# Patient Record
Sex: FEMALE | Race: BLACK OR AFRICAN AMERICAN | NOT HISPANIC OR LATINO | Employment: OTHER | URBAN - METROPOLITAN AREA
[De-identification: names, ages, dates, MRNs, and addresses within clinical notes are randomized per-mention and may not be internally consistent; named-entity substitution may affect disease eponyms.]

---

## 2017-10-23 ENCOUNTER — LAB VISIT (OUTPATIENT)
Dept: LAB | Facility: HOSPITAL | Age: 72
End: 2017-10-23
Attending: INTERNAL MEDICINE
Payer: MEDICARE

## 2017-10-23 ENCOUNTER — OFFICE VISIT (OUTPATIENT)
Dept: RHEUMATOLOGY | Facility: CLINIC | Age: 72
End: 2017-10-23
Payer: MEDICARE

## 2017-10-23 VITALS
BODY MASS INDEX: 37.68 KG/M2 | HEIGHT: 64 IN | SYSTOLIC BLOOD PRESSURE: 136 MMHG | WEIGHT: 220.69 LBS | HEART RATE: 64 BPM | DIASTOLIC BLOOD PRESSURE: 64 MMHG

## 2017-10-23 DIAGNOSIS — R60.0 PEDAL EDEMA: ICD-10-CM

## 2017-10-23 DIAGNOSIS — R60.0 PEDAL EDEMA: Primary | ICD-10-CM

## 2017-10-23 DIAGNOSIS — M79.7 FIBROMYALGIA: ICD-10-CM

## 2017-10-23 DIAGNOSIS — M77.32 HEEL SPUR, LEFT: ICD-10-CM

## 2017-10-23 DIAGNOSIS — Z55.9 EDUCATIONAL CIRCUMSTANCE: ICD-10-CM

## 2017-10-23 DIAGNOSIS — E66.9 OBESITY (BMI 30-39.9): ICD-10-CM

## 2017-10-23 LAB
ALBUMIN SERPL BCP-MCNC: 3.3 G/DL
ALP SERPL-CCNC: 132 U/L
ALT SERPL W/O P-5'-P-CCNC: 28 U/L
ANION GAP SERPL CALC-SCNC: 9 MMOL/L
AST SERPL-CCNC: 27 U/L
BASOPHILS # BLD AUTO: 0.04 K/UL
BASOPHILS NFR BLD: 0.7 %
BILIRUB SERPL-MCNC: 0.4 MG/DL
BUN SERPL-MCNC: 18 MG/DL
CALCIUM SERPL-MCNC: 10.6 MG/DL
CCP AB SER IA-ACNC: 2.4 U/ML
CHLORIDE SERPL-SCNC: 103 MMOL/L
CO2 SERPL-SCNC: 27 MMOL/L
CREAT SERPL-MCNC: 0.9 MG/DL
CRP SERPL-MCNC: 19.3 MG/L
DIFFERENTIAL METHOD: NORMAL
EOSINOPHIL # BLD AUTO: 0.3 K/UL
EOSINOPHIL NFR BLD: 5.4 %
ERYTHROCYTE [DISTWIDTH] IN BLOOD BY AUTOMATED COUNT: 14.4 %
ERYTHROCYTE [SEDIMENTATION RATE] IN BLOOD BY WESTERGREN METHOD: 31 MM/HR
EST. GFR  (AFRICAN AMERICAN): >60 ML/MIN/1.73 M^2
EST. GFR  (NON AFRICAN AMERICAN): >60 ML/MIN/1.73 M^2
GLUCOSE SERPL-MCNC: 81 MG/DL
HCT VFR BLD AUTO: 39.4 %
HGB BLD-MCNC: 13.1 G/DL
IMM GRANULOCYTES # BLD AUTO: 0.02 K/UL
IMM GRANULOCYTES NFR BLD AUTO: 0.4 %
LYMPHOCYTES # BLD AUTO: 1.2 K/UL
LYMPHOCYTES NFR BLD: 20.9 %
MCH RBC QN AUTO: 30 PG
MCHC RBC AUTO-ENTMCNC: 33.2 G/DL
MCV RBC AUTO: 90 FL
MONOCYTES # BLD AUTO: 0.6 K/UL
MONOCYTES NFR BLD: 9.6 %
NEUTROPHILS # BLD AUTO: 3.6 K/UL
NEUTROPHILS NFR BLD: 63 %
NRBC BLD-RTO: 0 /100 WBC
PLATELET # BLD AUTO: 210 K/UL
PMV BLD AUTO: 12 FL
POTASSIUM SERPL-SCNC: 4.2 MMOL/L
PROT SERPL-MCNC: 7.3 G/DL
RBC # BLD AUTO: 4.37 M/UL
RHEUMATOID FACT SERPL-ACNC: 13 IU/ML
SODIUM SERPL-SCNC: 139 MMOL/L
WBC # BLD AUTO: 5.7 K/UL

## 2017-10-23 PROCEDURE — 85025 COMPLETE CBC W/AUTO DIFF WBC: CPT

## 2017-10-23 PROCEDURE — 86431 RHEUMATOID FACTOR QUANT: CPT

## 2017-10-23 PROCEDURE — 99999 PR PBB SHADOW E&M-EST. PATIENT-LVL III: CPT | Mod: PBBFAC,,, | Performed by: INTERNAL MEDICINE

## 2017-10-23 PROCEDURE — 99205 OFFICE O/P NEW HI 60 MIN: CPT | Mod: S$GLB,,, | Performed by: INTERNAL MEDICINE

## 2017-10-23 PROCEDURE — 80053 COMPREHEN METABOLIC PANEL: CPT

## 2017-10-23 PROCEDURE — 86200 CCP ANTIBODY: CPT

## 2017-10-23 PROCEDURE — 36415 COLL VENOUS BLD VENIPUNCTURE: CPT

## 2017-10-23 PROCEDURE — 86140 C-REACTIVE PROTEIN: CPT

## 2017-10-23 PROCEDURE — 85651 RBC SED RATE NONAUTOMATED: CPT

## 2017-10-23 SDOH — SOCIAL DETERMINANTS OF HEALTH (SDOH): PROBLEMS RELATED TO EDUCATION AND LITERACY, UNSPECIFIED: Z55.9

## 2017-10-23 ASSESSMENT — ROUTINE ASSESSMENT OF PATIENT INDEX DATA (RAPID3)
PATIENT GLOBAL ASSESSMENT SCORE: 0
WHEN YOU AWAKENED IN THE MORNING OVER THE LAST WEEK, PLEASE INDICATE THE AMOUNT OF TIME IT TAKES UNTIL YOU ARE AS LIMBER AS YOU WILL BE FOR THE DAY: 35 MINS
TOTAL RAPID3 SCORE: 2.17
AM STIFFNESS SCORE: 1, YES
FATIGUE SCORE: 3.5
MDHAQ FUNCTION SCORE: 0
PAIN SCORE: 6.5
PSYCHOLOGICAL DISTRESS SCORE: 1.1
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
## 2017-10-23 NOTE — PATIENT INSTRUCTIONS
Exercise daily.  You can do Sit and Be Fit.    Lose weight gradually.      Living with Osteoarthritis    Osteoarthritis is a chronic disease and the most common type of arthritis. But it doesnt have to keep you from leading an active life. You can help control symptoms by exercising and losing weight if you are overweight. Using special tools also helps make life easier. Be sure to see your healthcare provider for scheduled checkups and lab work. If you have questions or concerns between office visits, call your healthcare provider's office.  Make exercise part of your life  Gentle exercise can help lessen your pain. Keep the following in mind:  · Choose exercises that improve joint motion and make your muscles stronger. Your healthcare provider or a physical therapist may suggest a few.  · Stretching and flexibility activities such as yoga and obi chi may improve pain and joint motion.  · Try low-impact sports, such as walking, biking, or doing exercises in a warm pool.  · Most people should exercise for at least 30 minutes a day on most days of the week. This can be broken up into shorter periods throughout the day.  · Dont push yourself too hard at first. Slowly build up over time.  · Make sure you warm up for 5 to 10 minutes before you exercise.  · If pain and stiffness increase, don't exercise as hard or as long.  Watch your weight  If you weigh more than you should, your weight-bearing joints are under extra pressure. This makes your symptoms worse. To reduce pain and stiffness, try shedding a few of those extra pounds. The tips below may help:  · Start a weight-loss program with the help of your healthcare provider.  · Ask your friends and family for support.  · Join a weight-loss group.  Use special tools  Even simple tasks can be hard to do when your joints hurt. Special tools called assistive devices can make things easier by reducing strain and protecting your joints. Ask your healthcare provider where  to find these and other helpful tools:  · Long-handled reachers or grabbers  · Jar openers and button threaders  · Large  for pencils, garden tools, and other hand-held objects  Use mobility and other aids  People with arthritis and other joint problems often use mobility aids to help with walking. For example, they may use canes or walkers. They may also use splints or braces to support joints. Talk with your healthcare provider or physical therapist about these aids:  · A cane to reduce knee or hip pain and help prevent falls  · Splints for your wrists or other joints  · A brace to support a weak knee joint  · Orthotics for toe and foot involvement  Medical and surgical treatments  Discuss medical treatments with your healthcare provider to help reduce your pain and improve joint mobility.  · Topical medicines such as lidocaine, capsaicin, and diclofenac gel  · Oral medicines such as acetaminophen, NSAIDs (nonsteroidal anti-inflammatory medicines) such as ibuprofen and naproxen, or opioid narcotics  · Injections in affected joints such as corticosteroids in various joints, or hyaluronic acid in the knee joints  · Surgical repair or surgical joint replacement with artificial joints  · Complementary therapies such as heat and cold treatment, massage, acupuncture, supplements, cognitive training, meditation, and others. Discuss these options with your healthcare provider.  Date Last Reviewed: 2/14/2016  © 9538-8784 [employer]. [address] [place] [address], [place] [address]. All rights reserved. This information is not intended as a substitute for professional medical care. Always follow your healthcare professional's instructions.

## 2020-05-30 ENCOUNTER — OFFICE VISIT (OUTPATIENT)
Dept: URGENT CARE | Facility: CLINIC | Age: 75
End: 2020-05-30
Payer: MEDICARE

## 2020-05-30 VITALS
OXYGEN SATURATION: 99 % | RESPIRATION RATE: 17 BRPM | SYSTOLIC BLOOD PRESSURE: 178 MMHG | DIASTOLIC BLOOD PRESSURE: 80 MMHG | TEMPERATURE: 97 F | HEIGHT: 63 IN | BODY MASS INDEX: 37.21 KG/M2 | HEART RATE: 73 BPM | WEIGHT: 210 LBS

## 2020-05-30 DIAGNOSIS — M54.32 SCIATICA OF LEFT SIDE: Primary | ICD-10-CM

## 2020-05-30 PROCEDURE — 96372 THER/PROPH/DIAG INJ SC/IM: CPT | Mod: S$GLB,,, | Performed by: NURSE PRACTITIONER

## 2020-05-30 PROCEDURE — 99214 OFFICE O/P EST MOD 30 MIN: CPT | Mod: 25,S$GLB,, | Performed by: NURSE PRACTITIONER

## 2020-05-30 PROCEDURE — 96372 PR INJECTION,THERAP/PROPH/DIAG2ST, IM OR SUBCUT: ICD-10-PCS | Mod: S$GLB,,, | Performed by: NURSE PRACTITIONER

## 2020-05-30 PROCEDURE — 99214 PR OFFICE/OUTPT VISIT, EST, LEVL IV, 30-39 MIN: ICD-10-PCS | Mod: 25,S$GLB,, | Performed by: NURSE PRACTITIONER

## 2020-05-30 RX ORDER — OMEPRAZOLE 20 MG/1
CAPSULE, DELAYED RELEASE ORAL
COMMUNITY
Start: 2020-05-19

## 2020-05-30 RX ORDER — IRBESARTAN 300 MG/1
300 TABLET ORAL DAILY
COMMUNITY
Start: 2020-05-06

## 2020-05-30 RX ORDER — MELOXICAM 15 MG/1
15 TABLET ORAL DAILY
Qty: 14 TABLET | Refills: 0 | Status: SHIPPED | OUTPATIENT
Start: 2020-05-30 | End: 2020-06-13

## 2020-05-30 RX ORDER — GABAPENTIN 100 MG/1
100 CAPSULE ORAL 2 TIMES DAILY
COMMUNITY
Start: 2020-05-05

## 2020-05-30 RX ORDER — KETOROLAC TROMETHAMINE 30 MG/ML
30 INJECTION, SOLUTION INTRAMUSCULAR; INTRAVENOUS
Status: COMPLETED | OUTPATIENT
Start: 2020-05-30 | End: 2020-05-30

## 2020-05-30 RX ADMIN — KETOROLAC TROMETHAMINE 30 MG: 30 INJECTION, SOLUTION INTRAMUSCULAR; INTRAVENOUS at 11:05

## 2020-05-30 NOTE — PATIENT INSTRUCTIONS
INCREASE FLUIDS   START TAKING MOBIC DAILY   FOLLOW UP WITH ORTH AS NEEDED IF NO IMPROVEMENT WITH CURRENT MANAGEMENT    You must understand that you've received an Urgent Care treatment only and that you may be released before all your medical problems are known or treated. You, the patient, will arrange for follow up care as instructed.  If your condition worsens we recommend that you receive another evaluation at the emergency room immediately or contact your primary medical clinics after hours call service to discuss your concerns.  Please return here or go to the Emergency Department for any concerns or worsening of condition.      Sciatica    Sciatica is a condition that causes pain in the lower back that spreads down into the buttock, hip, and leg. Sometimes the leg pain can happen without any back pain. Sciatica happens when a spinal nerve is irritated or has pressure put on it as comes out of the spinal canal in the lower back. This most often happens when a bulge or rupture of a nearby spinal disk presses on the nerve. Sciatica can also be caused by a narrowing of the spinal canal (spinal stenosis) or spasm of the muscle in the buttocks that the sciatic nerve passes through (pyriform muscle). Sciatica is also called lumbar radiculopathy.  Sciatica may begin after a sudden twisting or bending force, such as in a car accident. Or it can happen after a simple awkward movement. In either case, muscle spasm often also happens. Muscle spasm makes the pain worse.  A healthcare provider makes a diagnosis of sciatica from your symptoms and a physical exam. Unless you had an injury from a car accident or fall, you usually wont have X-rays taken at this time. This is because the nerves and disks in your back cant be seen on an X-ray. If the provider sees signs of a compressed nerve, you will need to schedule an MRI scan as an outpatient. Signs of a compressed nerve include loss of strength in a leg.  Most sciatica  gets better with medicine, exercise, and physical therapy. If your symptoms continue after at least 3 months of medical treatment, you may need surgery or injections to your lower back.  Home care  Follow these tips when caring for yourself at home:  · You may need to stay in bed the first few days. But as soon as possible, begin sitting up or walking. This will help you avoid problems that come from staying in bed for long periods.  · When in bed, try to find a position that is comfortable. A firm mattress is best. Try lying flat on your back with pillows under your knees. You can also try lying on your side with your knees bent up toward your chest and a pillow between your knees.  · Avoid sitting for long periods. This puts more stress on your lower back than standing or walking.  · Use heat from a hot shower, hot bath, or heating pad to help ease pain. Massage can also help. You can also try using an ice pack. You can make your own ice pack by putting ice cubes in a plastic bag. Wrap the bag in a thin towel. Try both heat and cold to see which works best. Use the method that feels best for 20 minutes several times a day.  · You may use acetaminophen or ibuprofen to ease pain, unless another pain medicine was prescribed. Note: If you have chronic liver or kidney disease, talk with your healthcare provider before taking these medicines. Also talk with your provider if youve had a stomach ulcer or gastrointestinal bleeding.  · Use safe lifting methods. Dont lift anything heavier than 15 pounds until all of the pain is gone.  Follow-up care  Follow up with your healthcare provider, or as advised. You may need physical therapy or additional tests.  If X-rays were taken, a radiologist will look at them. You will be told of any new findings that may affect your care.  When to seek medical advice  Call your healthcare provider right away if any of these occur:  · Pain gets worse even after taking prescribed  medicine  · Weakness or numbness in 1 or both legs or hips  · Numbness in your groin or genital area  · You cant control your bowel or bladder  · Fever  · Redness or swelling over your back or spine   Date Last Reviewed: 8/1/2016  © 1671-5010 [employer]. [address] [place] [address]. All rights reserved. This information is not intended as a substitute for professional medical care. Always follow your healthcare professional's instructions.

## 2020-05-30 NOTE — PROGRESS NOTES
"Subjective:       Patient ID: Adri Rice is a 74 y.o. female.    Vitals:  height is 5' 3" (1.6 m) and weight is 95.3 kg (210 lb). Her temperature is 97.1 °F (36.2 °C). Her blood pressure is 178/80 (abnormal) and her pulse is 73. Her respiration is 17 and oxygen saturation is 99%.     Chief Complaint: Sciatica    Pt states hx of chronic back pain, arthritis on knee (mobic daily 15 mg), sciatica, and HTN. Pt states flare up of left lower back pain with left leg pain x 4 days. Pt denies injury.  Denies any heavy lifting or straining. Denies any frequency, urgency or dysuria.     Back Pain   This is a chronic problem. The current episode started in the past 7 days. The problem occurs constantly. The problem is unchanged. The pain is present in the sacro-iliac. The quality of the pain is described as shooting. The pain radiates to the left thigh. The pain is at a severity of 9/10. The symptoms are aggravated by sitting. Pertinent negatives include no chest pain, dysuria, fever, headaches or weakness.       Constitution: Negative for chills, fatigue and fever.   HENT: Negative for congestion and sore throat.    Neck: Negative for painful lymph nodes.   Cardiovascular: Negative for chest pain and leg swelling.   Eyes: Negative for double vision and blurred vision.   Respiratory: Negative for cough and shortness of breath.    Gastrointestinal: Negative for nausea, vomiting and diarrhea.   Genitourinary: Negative for dysuria, frequency, urgency and history of kidney stones.   Musculoskeletal: Positive for back pain. Negative for joint pain, joint swelling, muscle cramps and muscle ache.   Skin: Negative for color change, pale, rash and bruising.   Allergic/Immunologic: Negative for seasonal allergies.   Neurological: Negative for dizziness, history of vertigo, light-headedness, passing out and headaches.   Hematologic/Lymphatic: Negative for swollen lymph nodes.   Psychiatric/Behavioral: Negative for " nervous/anxious, sleep disturbance and depression. The patient is not nervous/anxious.        Objective:      Physical Exam   Constitutional: She is oriented to person, place, and time. Vital signs are normal. She appears well-developed and well-nourished. She is active and cooperative. No distress.   HENT:   Head: Normocephalic and atraumatic.   Nose: Nose normal.   Mouth/Throat: Oropharynx is clear and moist and mucous membranes are normal.   Eyes: Conjunctivae and lids are normal.   Neck: Trachea normal, normal range of motion, full passive range of motion without pain and phonation normal. Neck supple.   Cardiovascular: Normal rate, regular rhythm, normal heart sounds, intact distal pulses and normal pulses.   Pulmonary/Chest: Effort normal and breath sounds normal.   Abdominal: Soft. Normal appearance and bowel sounds are normal. She exhibits no abdominal bruit, no pulsatile midline mass and no mass.   Musculoskeletal: She exhibits no edema or deformity.        Left hip: She exhibits tenderness. She exhibits normal range of motion, normal strength, no bony tenderness, no swelling and no crepitus.        Lumbar back: She exhibits tenderness and pain. She exhibits normal range of motion, no bony tenderness, no swelling, no edema, no deformity, no laceration, no spasm and normal pulse.   NEG ST LEG RAISE  FULL ROM B LE WITH 5/5 STRENGTH  2+DTR PATELLA AND ACHILLES  NVIT DISTALLY WITH SILT AND 2+BCR  ABLE TO AMBULATE WITH MILD LIMPING GAIT DUE TO PAIN     Neurological: She is alert and oriented to person, place, and time. She has normal strength and normal reflexes. No sensory deficit.   Skin: Skin is warm, dry, intact and not diaphoretic.   Psychiatric: She has a normal mood and affect. Her speech is normal and behavior is normal. Judgment and thought content normal. Cognition and memory are normal.   Nursing note and vitals reviewed.        Assessment:       1. Sciatica of left side        Plan:         Sciatica  of left side  -     ketorolac injection 30 mg  -     meloxicam (MOBIC) 15 MG tablet; Take 1 tablet (15 mg total) by mouth once daily. for 14 days  Dispense: 14 tablet; Refill: 0           Patient Instructions   INCREASE FLUIDS   START TAKING MOBIC DAILY   FOLLOW UP WITH ORTH AS NEEDED IF NO IMPROVEMENT WITH CURRENT MANAGEMENT    You must understand that you've received an Urgent Care treatment only and that you may be released before all your medical problems are known or treated. You, the patient, will arrange for follow up care as instructed.  If your condition worsens we recommend that you receive another evaluation at the emergency room immediately or contact your primary medical clinics after hours call service to discuss your concerns.  Please return here or go to the Emergency Department for any concerns or worsening of condition.      Sciatica    Sciatica is a condition that causes pain in the lower back that spreads down into the buttock, hip, and leg. Sometimes the leg pain can happen without any back pain. Sciatica happens when a spinal nerve is irritated or has pressure put on it as comes out of the spinal canal in the lower back. This most often happens when a bulge or rupture of a nearby spinal disk presses on the nerve. Sciatica can also be caused by a narrowing of the spinal canal (spinal stenosis) or spasm of the muscle in the buttocks that the sciatic nerve passes through (pyriform muscle). Sciatica is also called lumbar radiculopathy.  Sciatica may begin after a sudden twisting or bending force, such as in a car accident. Or it can happen after a simple awkward movement. In either case, muscle spasm often also happens. Muscle spasm makes the pain worse.  A healthcare provider makes a diagnosis of sciatica from your symptoms and a physical exam. Unless you had an injury from a car accident or fall, you usually wont have X-rays taken at this time. This is because the nerves and disks in your back  cant be seen on an X-ray. If the provider sees signs of a compressed nerve, you will need to schedule an MRI scan as an outpatient. Signs of a compressed nerve include loss of strength in a leg.  Most sciatica gets better with medicine, exercise, and physical therapy. If your symptoms continue after at least 3 months of medical treatment, you may need surgery or injections to your lower back.  Home care  Follow these tips when caring for yourself at home:  · You may need to stay in bed the first few days. But as soon as possible, begin sitting up or walking. This will help you avoid problems that come from staying in bed for long periods.  · When in bed, try to find a position that is comfortable. A firm mattress is best. Try lying flat on your back with pillows under your knees. You can also try lying on your side with your knees bent up toward your chest and a pillow between your knees.  · Avoid sitting for long periods. This puts more stress on your lower back than standing or walking.  · Use heat from a hot shower, hot bath, or heating pad to help ease pain. Massage can also help. You can also try using an ice pack. You can make your own ice pack by putting ice cubes in a plastic bag. Wrap the bag in a thin towel. Try both heat and cold to see which works best. Use the method that feels best for 20 minutes several times a day.  · You may use acetaminophen or ibuprofen to ease pain, unless another pain medicine was prescribed. Note: If you have chronic liver or kidney disease, talk with your healthcare provider before taking these medicines. Also talk with your provider if youve had a stomach ulcer or gastrointestinal bleeding.  · Use safe lifting methods. Dont lift anything heavier than 15 pounds until all of the pain is gone.  Follow-up care  Follow up with your healthcare provider, or as advised. You may need physical therapy or additional tests.  If X-rays were taken, a radiologist will look at them. You  will be told of any new findings that may affect your care.  When to seek medical advice  Call your healthcare provider right away if any of these occur:  · Pain gets worse even after taking prescribed medicine  · Weakness or numbness in 1 or both legs or hips  · Numbness in your groin or genital area  · You cant control your bowel or bladder  · Fever  · Redness or swelling over your back or spine   Date Last Reviewed: 8/1/2016  © 5993-3365 [employer] [address] [place] [address], [place] [address]. All rights reserved. This information is not intended as a substitute for professional medical care. Always follow your healthcare professional's instructions.

## 2020-06-26 ENCOUNTER — TELEPHONE (OUTPATIENT)
Dept: NEUROSURGERY | Facility: CLINIC | Age: 75
End: 2020-06-26

## 2020-06-26 DIAGNOSIS — M48.07 SPINAL STENOSIS, LUMBOSACRAL REGION: ICD-10-CM

## 2020-07-20 ENCOUNTER — TELEPHONE (OUTPATIENT)
Dept: NEUROSURGERY | Facility: CLINIC | Age: 75
End: 2020-07-20

## 2020-07-20 NOTE — TELEPHONE ENCOUNTER
Rad report and referral from Dr. Lee sent to MR for upload.  Pt states she is not interested in NS intervention at this time and was under the assumption she was seeing Dr. Padilla for PM services.  Recommended the pt reach out to Dr. Lee for a referral to PM and invited pt to call if she decides she would like to be seen in NS.  V/U.

## 2020-07-30 ENCOUNTER — TELEPHONE (OUTPATIENT)
Dept: PAIN MEDICINE | Facility: CLINIC | Age: 75
End: 2020-07-30

## 2020-07-30 DIAGNOSIS — M54.16 LUMBAR BACK PAIN WITH RADICULOPATHY AFFECTING LEFT LOWER EXTREMITY: Primary | ICD-10-CM

## 2020-08-06 ENCOUNTER — TELEPHONE (OUTPATIENT)
Dept: PAIN MEDICINE | Facility: CLINIC | Age: 75
End: 2020-08-06

## 2020-08-06 NOTE — TELEPHONE ENCOUNTER
----- Message from Joanne Torres sent at 8/6/2020  9:58 AM CDT -----  Regarding: Patient call back  Who called:LIZ MATILDA [[id number]]    What is the request in detail: Patient is requesting a call back. She states she has psoriasis and there is an affecting area in her back. She would like to know if the staff will still be able to give her the injection.   Please advise.    Can the clinic reply by [facility]? No    Best call back number: [phone]    Additional Information: N/A

## 2020-08-06 NOTE — TELEPHONE ENCOUNTER
Patient was contacted and informed that she can have her injection however the provider will evaluate her and inform indefinitely if he can perform the procedure while she has psoriasis. Patient verbalized understanding

## 2020-08-11 ENCOUNTER — HOSPITAL ENCOUNTER (OUTPATIENT)
Facility: OTHER | Age: 75
Discharge: HOME | End: 2020-08-11
Attending: ANESTHESIOLOGY | Admitting: ANESTHESIOLOGY
Payer: MEDICARE

## 2020-08-11 VITALS
HEART RATE: 63 BPM | WEIGHT: 205 LBS | HEIGHT: 63 IN | BODY MASS INDEX: 36.32 KG/M2 | TEMPERATURE: 98 F | DIASTOLIC BLOOD PRESSURE: 79 MMHG | RESPIRATION RATE: 16 BRPM | SYSTOLIC BLOOD PRESSURE: 179 MMHG | OXYGEN SATURATION: 98 %

## 2020-08-11 DIAGNOSIS — M54.16 LUMBAR RADICULOPATHY: Primary | ICD-10-CM

## 2020-08-11 DIAGNOSIS — G89.29 CHRONIC PAIN: ICD-10-CM

## 2020-08-11 PROCEDURE — 62323 NJX INTERLAMINAR LMBR/SAC: CPT | Mod: ,,, | Performed by: ANESTHESIOLOGY

## 2020-08-11 PROCEDURE — 62323 PR INJ LUMBAR/SACRAL, W/IMAGING GUIDANCE: ICD-10-PCS | Mod: ,,, | Performed by: ANESTHESIOLOGY

## 2020-08-11 PROCEDURE — 25000003 PHARM REV CODE 250: Performed by: STUDENT IN AN ORGANIZED HEALTH CARE EDUCATION/TRAINING PROGRAM

## 2020-08-11 PROCEDURE — 25500020 PHARM REV CODE 255: Performed by: ANESTHESIOLOGY

## 2020-08-11 PROCEDURE — 63600175 PHARM REV CODE 636 W HCPCS: Performed by: ANESTHESIOLOGY

## 2020-08-11 PROCEDURE — 62323 NJX INTERLAMINAR LMBR/SAC: CPT | Performed by: ANESTHESIOLOGY

## 2020-08-11 PROCEDURE — 25000003 PHARM REV CODE 250: Performed by: ANESTHESIOLOGY

## 2020-08-11 RX ORDER — SODIUM CHLORIDE 0.9 G/100ML
IRRIGANT IRRIGATION
Status: DISCONTINUED | OUTPATIENT
Start: 2020-08-11 | End: 2020-08-11 | Stop reason: HOSPADM

## 2020-08-11 RX ORDER — DEXAMETHASONE SODIUM PHOSPHATE 10 MG/ML
INJECTION INTRAMUSCULAR; INTRAVENOUS
Status: DISCONTINUED | OUTPATIENT
Start: 2020-08-11 | End: 2020-08-11 | Stop reason: HOSPADM

## 2020-08-11 RX ORDER — SODIUM CHLORIDE 9 MG/ML
500 INJECTION, SOLUTION INTRAVENOUS CONTINUOUS
Status: DISCONTINUED | OUTPATIENT
Start: 2020-08-11 | End: 2020-08-11 | Stop reason: HOSPADM

## 2020-08-11 RX ORDER — MIDAZOLAM HYDROCHLORIDE 1 MG/ML
INJECTION INTRAMUSCULAR; INTRAVENOUS
Status: DISCONTINUED | OUTPATIENT
Start: 2020-08-11 | End: 2020-08-11 | Stop reason: HOSPADM

## 2020-08-11 RX ORDER — LIDOCAINE HYDROCHLORIDE 10 MG/ML
INJECTION INFILTRATION; PERINEURAL
Status: DISCONTINUED | OUTPATIENT
Start: 2020-08-11 | End: 2020-08-11 | Stop reason: HOSPADM

## 2020-08-11 RX ORDER — FENTANYL CITRATE 50 UG/ML
INJECTION, SOLUTION INTRAMUSCULAR; INTRAVENOUS
Status: DISCONTINUED | OUTPATIENT
Start: 2020-08-11 | End: 2020-08-11 | Stop reason: HOSPADM

## 2020-08-11 RX ORDER — BUPIVACAINE HYDROCHLORIDE 2.5 MG/ML
INJECTION, SOLUTION EPIDURAL; INFILTRATION; INTRACAUDAL
Status: DISCONTINUED | OUTPATIENT
Start: 2020-08-11 | End: 2020-08-11 | Stop reason: HOSPADM

## 2020-08-11 RX ADMIN — SODIUM CHLORIDE 500 ML: 0.9 INJECTION, SOLUTION INTRAVENOUS at 10:08

## 2020-08-11 NOTE — H&P
"HPI  Patient presenting for Procedure(s) (LRB):  LUMBAR L2/3 NANCY DIRECT REFERRAL (N/A)     Patient on Anti-coagulation No    75 y/o female referred to us by her primary care for a L2/3 NANCY. She has known spinal stenosis with radicular symptoms into the left leg. She has declined surgical intervention at this time. She endorses constant low back pain with radiation into the left buttock and thigh since around October 2019. This is a flare of her chronic low back pain, which is intermittently present. She has tried muscle relaxers and anti-inflammatories without sustained relief.  She has also received Toradol and PO corticosteroids, which have helped in the past. She has not had any interventions for this pain in the past, but she has had cervical pain interventions in the past with good relief.     Past Medical History:   Diagnosis Date    Asthma     Cancer     breast    Fibromyalgia     Hypertension     Psoriasis vulgaris      Past Surgical History:   Procedure Laterality Date    APPENDECTOMY  1962    BREAST LUMPECTOMY  7-    breast cancer    BUNIONECTOMY  2010    KNEE ARTHROSCOPY  2004    right    KNEE SURGERY  10-13-14    right TKR    SHOULDER SURGERY  1982    right/had a bone infectioni    THYROIDECTOMY  1977    hyperparathyroid&thyroidectomy 2006     Review of patient's allergies indicates:   Allergen Reactions    Bactrim [sulfamethoxazole-trimethoprim] Swelling    Atorvastatin Other (See Comments)      Current Facility-Administered Medications   Medication    0.9%  NaCl infusion       PMHx, PSHx, Allergies, Medications reviewed in epic    ROS negative except pain complaints in HPI    OBJECTIVE:    BP (!) 148/69 (BP Location: Left arm, Patient Position: Sitting)   Pulse 71   Temp 98.2 °F (36.8 °C) (Oral)   Resp 17   Ht 5' 3" (1.6 m)   Wt 93 kg (205 lb)   LMP  (LMP Unknown)   SpO2 99%   BMI 36.31 kg/m²     PHYSICAL EXAMINATION:    GENERAL: Well appearing, in no acute distress, alert " and oriented x3.  PSYCH:  Mood and affect appropriate.  SKIN: psoriasis plaque on the back in the area of the lumbar spine.  CV: RRR with palpation of the radial artery.  PULM: No evidence of respiratory difficulty, symmetric chest rise. Clear to auscultation.  NEURO: Cranial nerves grossly intact.    MRI lumbar spine ext report with stenosis at L2/3 and L3/4 with neuroforaminal stenosis DDD facet hypertrophy.    Plan:    Proceed with procedure as planned Procedure(s) (LRB):  LUMBAR L2/3 NANCY DIRECT REFERRAL (N/A)    F/u in clinic 2 weeks for full evaluation    In the future the patient may benefit from an epidural injection at L4-5 below the area of stenosis    If no response and neurosurgery is not a consideration, may consider spinal cord stimulation in the future, but will need to work to control psoriasis.      Amilcar Freitas  08/11/2020          I reviewed and edited the  resident's note, I conducted my own interview and physical examination and agree with the findings.     Ion Bearden  08/11/2020

## 2020-08-11 NOTE — DISCHARGE INSTRUCTIONS

## 2020-08-11 NOTE — DISCHARGE SUMMARY
Discharge Note  Short Stay      SUMMARY     Admit Date: 8/11/2020    Attending Physician: Ion Bearden      Discharge Physician: Ion Bearden      Discharge Date: 8/11/2020 1:50 PM    Procedure(s) (LRB):  LUMBAR L2/3 NANCY DIRECT REFERRAL (N/A)    Final Diagnosis: Lumbar back pain with radiculopathy affecting left lower extremity [M54.16]    Disposition: Home or self care    Patient Instructions:   Discharge Medication List as of 8/11/2020 11:27 AM      CONTINUE these medications which have NOT CHANGED    Details   albuterol (ACCUNEB) 0.63 mg/3 mL Nebu Take 0.63 mg by nebulization every 6 (six) hours as needed., Until Discontinued, Historical Med      calcipotriene (DOVONOX) 0.005 % cream Starting 7/31/2015, Until Discontinued, Historical Med      calcipotriene 0.005 % ointment Starting 9/2/2015, Until Discontinued, Historical Med      calcium carbonate-vitamin D3 250-125 mg 250-125 mg-unit Tab Take 1 tablet by mouth once daily., Starting 10/24/2014, Until Sat 10/24/15, OTC      cetirizine (ZYRTEC) 10 MG tablet Take 10 mg by mouth once daily., Until Discontinued, Historical Med      desloratadine (CLARINEX) 5 mg tablet Starting 5/7/2015, Until Discontinued, Historical Med      fluocinonide (LIDEX) 0.05 % external solution Apply topically once daily. To scalp, Starting 5/23/2013, Until Sun 6/2/13, Normal      fluticasone (FLOVENT HFA) 110 mcg/actuation inhaler Inhale 1 puff into the lungs 2 (two) times daily., Until Discontinued, Historical Med      gabapentin (NEURONTIN) 100 MG capsule Starting Tue 5/5/2020, Historical Med      irbesartan (AVAPRO) 300 MG tablet Starting Wed 5/6/2020, Historical Med      irbesartan-hydrochlorothiazide (AVALIDE) 300-12.5 mg per tablet Starting 7/10/2014, Until Discontinued, Historical Med      levothyroxine (SYNTHROID) 125 MCG tablet Take 125 mcg by mouth once daily., Until Discontinued, Historical Med      omeprazole (PRILOSEC) 20 MG capsule Starting Tue 5/19/2020, Historical  Med      VECTICAL 3 mcg/gram ointment AAA qd - bid for maintanence to under breast and stomach, Normal                 Discharge Diagnosis: Lumbar back pain with radiculopathy affecting left lower extremity [M54.16]  Condition on Discharge: Stable with no complications to procedure   Diet on Discharge: Same as before.  Activity: as per instruction sheet.  Discharge to: Home with a responsible adult.  Follow up: 2-4 weeks       Please call my office or pager at [phone] if experienced any weakness or loss of sensation, fever > 101.5, pain uncontrolled with oral medications, persistent nausea/vomiting/or diarrhea, redness or drainage from the incisions, or any other worrisome concerns. If physician on call was not reached or could not communicate with our office for any reason please go to the nearest emergency department

## 2020-08-11 NOTE — OP NOTE
Lumbar Interlaminar Epidural Steroid Injection under Fluoroscopic Guidance.   Time-out taken to identify patient and procedure prior to starting the procedure.     08/11/2020    PROCEDURE: Interlaminar epidural steroid injection under fluoroscopic guidance.     Pre-Op diagnosis: Lumbar back pain with radiculopathy affecting left lower extremity [M54.16]    Post-Op diagnosis: Lumbar back pain with radiculopathy affecting left lower extremity [M54.16]    PHYSICIAN: SANDOVAL LU     ASSISTANTS: Landon Freitas, PGY-2  I was present and supervising all critical portions of the procedure       ESTIMATED BLOOD LOSS: none.     COMPLICATIONS: none.     SPECIMENS: none    TECHNIQUE: With the patient laying in a prone position, the area was prepped and draped in the usual sterile fashion using ChloraPrep and a fenestrated drape. 1% lidocaine was given using a 27-gauge needle by raising a wheal and going down to the hub of the needle over the L2/3 interlaminar space.  The interlaminar space was then approached with a 3.5 inch 18-gauge Touhy needle was introduced under fluoroscopic guidance in the AP and Lateral view. Once the Ligamentum flavum was encountered loss of resistance to saline was used to enter the epidural space. With positive loss of resistance and negative CSF or Blood, 3mL contrast dye Omnipaque (300mg/ml) was injected to confirm placement and there was no vascular runoff. Then 1ml 10mg/ml Decadron+ 1mL 0.25% Bupivicaine + 8mL preservative free normal saline was injected slowly. Displacement of the radio opaque contrast after injection of the medication confirmed that the medication went into the area of the epidural space.  The patient tolerated the procedure well.     Conscious sedation provided by M.D    The patient was monitored with continuous pulse oximetry, EKG, and intermittent blood pressure monitors.  The patient was hemodynamically stable throughout the entire process was responsive to voice,  and breathing spontaneously.  Supplemental O2 was provided at 2L/min via nasal cannula.  Patient was comfortable for the duration of the procedure. (See nurse documentation and case log for sedation time)    There was a total of 1mg IV Midazolam and 50mcg Fentanyl titrated for the procedure      The patient was monitored after the procedure.   They were given post-procedure and discharge instructions to follow at home.  The patient was discharged in a stable condition.

## 2020-09-08 ENCOUNTER — OFFICE VISIT (OUTPATIENT)
Dept: PAIN MEDICINE | Facility: CLINIC | Age: 75
End: 2020-09-08
Payer: MEDICARE

## 2020-09-08 VITALS
BODY MASS INDEX: 38.27 KG/M2 | RESPIRATION RATE: 18 BRPM | TEMPERATURE: 98 F | DIASTOLIC BLOOD PRESSURE: 73 MMHG | HEIGHT: 63 IN | SYSTOLIC BLOOD PRESSURE: 138 MMHG | WEIGHT: 216 LBS | HEART RATE: 71 BPM

## 2020-09-08 DIAGNOSIS — M79.7 FIBROMYALGIA: ICD-10-CM

## 2020-09-08 DIAGNOSIS — M54.16 LUMBAR RADICULOPATHY: Primary | ICD-10-CM

## 2020-09-08 DIAGNOSIS — G89.29 OTHER CHRONIC PAIN: ICD-10-CM

## 2020-09-08 PROCEDURE — 99999 PR PBB SHADOW E&M-EST. PATIENT-LVL V: CPT | Mod: PBBFAC,,, | Performed by: NURSE PRACTITIONER

## 2020-09-08 PROCEDURE — 99214 PR OFFICE/OUTPT VISIT, EST, LEVL IV, 30-39 MIN: ICD-10-PCS | Mod: S$GLB,,, | Performed by: NURSE PRACTITIONER

## 2020-09-08 PROCEDURE — 1125F PR PAIN SEVERITY QUANTIFIED, PAIN PRESENT: ICD-10-PCS | Mod: S$GLB,,, | Performed by: NURSE PRACTITIONER

## 2020-09-08 PROCEDURE — 99214 OFFICE O/P EST MOD 30 MIN: CPT | Mod: S$GLB,,, | Performed by: NURSE PRACTITIONER

## 2020-09-08 PROCEDURE — 3075F PR MOST RECENT SYSTOLIC BLOOD PRESS GE 130-139MM HG: ICD-10-PCS | Mod: CPTII,S$GLB,, | Performed by: NURSE PRACTITIONER

## 2020-09-08 PROCEDURE — 1101F PT FALLS ASSESS-DOCD LE1/YR: CPT | Mod: CPTII,S$GLB,, | Performed by: NURSE PRACTITIONER

## 2020-09-08 PROCEDURE — 99999 PR PBB SHADOW E&M-EST. PATIENT-LVL V: ICD-10-PCS | Mod: PBBFAC,,, | Performed by: NURSE PRACTITIONER

## 2020-09-08 PROCEDURE — 1159F MED LIST DOCD IN RCRD: CPT | Mod: S$GLB,,, | Performed by: NURSE PRACTITIONER

## 2020-09-08 PROCEDURE — 3078F DIAST BP <80 MM HG: CPT | Mod: CPTII,S$GLB,, | Performed by: NURSE PRACTITIONER

## 2020-09-08 PROCEDURE — 1125F AMNT PAIN NOTED PAIN PRSNT: CPT | Mod: S$GLB,,, | Performed by: NURSE PRACTITIONER

## 2020-09-08 PROCEDURE — 1159F PR MEDICATION LIST DOCUMENTED IN MEDICAL RECORD: ICD-10-PCS | Mod: S$GLB,,, | Performed by: NURSE PRACTITIONER

## 2020-09-08 PROCEDURE — 3078F PR MOST RECENT DIASTOLIC BLOOD PRESSURE < 80 MM HG: ICD-10-PCS | Mod: CPTII,S$GLB,, | Performed by: NURSE PRACTITIONER

## 2020-09-08 PROCEDURE — 3075F SYST BP GE 130 - 139MM HG: CPT | Mod: CPTII,S$GLB,, | Performed by: NURSE PRACTITIONER

## 2020-09-08 PROCEDURE — 1101F PR PT FALLS ASSESS DOC 0-1 FALLS W/OUT INJ PAST YR: ICD-10-PCS | Mod: CPTII,S$GLB,, | Performed by: NURSE PRACTITIONER

## 2020-09-08 RX ORDER — AMLODIPINE BESYLATE 5 MG/1
5 TABLET ORAL DAILY
COMMUNITY
Start: 2020-08-10

## 2020-09-08 NOTE — PROGRESS NOTES
Chronic Pain - Consult     Referring Physician: No ref. provider found    Chief Complaint:   Chief Complaint   Patient presents with    Low-back Pain        SUBJECTIVE: Disclaimer: This note has been generated using voice-recognition software. There may be typographical errors that have been missed during proof-reading    Initial encounter:    Adri Rice presents to the clinic for the evaluation of back and leg pain.  She is s/p L2/3 IL NANCY as a direct referral from her orthopedist, Dr. Lee. She reports limited benefit from the procedure.  She is reporting pain across the lower back with radiation into the anterolateral aspect of both legs, left greater than right.  She says that she has all over pain from fibromyalgia.  She also says that her pain is usually worse in the morning and she has improvement with ambulation and activity.  She has not tried physical therapy in the recent few years.  The pain started years ago and symptoms have been worsening.    Brief history:    Pain Description:    The pain is located in the back area and radiates to the legs.      At BEST  4/10     At WORST  8/10 on the WORST day.      On average pain is rated as 6/10.     Today the pain is rated as 6/10    The pain is described as shooting and stabbing      Symptoms interfere with daily activity and sleeping.     Exacerbating factors: Bending and Walking.      Mitigating factors medications and rest.     Patient denies night fever/night sweats.  Patient denies any suicidal or homicidal ideations    Pain Medications:  Current:  Tylenol PRN- helpful    Tried in Past:  NSAIDs - Mobic  TCA -Never  SNRI -Never  Anti-convulsants - Gabapentin  Muscle Relaxants -Never  Opioids- Norco recently, causes constipation, Tramadol- helpful  Benzodiazepines -Never    Physical Therapy/Home Exercise: no       report:  Not applicable    Pain Procedures:  8/11/20 L2/3 IL NANCY- limited benefit    Chiropractor -never  Acupuncture -  never  TENS unit -never  Spinal decompression -never  Joint replacement - Right TKA    Imaging: Outside lumbar MRI 3/6/20  Impression:    1.  Moderate discogenic disease at L3-4 and L4-5 with hypertrophic facet arthropathy seen throughout the lumbar spine.  There is grade 1 spondylolisthesis of L3 on L4 and L4 on L5.  There is foraminal restriction seen primarily at L2-3 on the left, bilaterally at L3-4 and L4-5.  Bruit contact is seen within the left neural foramina at L2-3 and bilaterally at L3-4.  There is mild contact of the exiting nerve root and the left neural foramina at L4-5.  2.  Moderate lumbar stenosis at L2-3 with moderate lumbar stenosis at L4-5 and marked stenosis at L3-4.      Lab Results   Component Value Date    WBC 5.70 10/23/2017    HGB 13.1 10/23/2017    HCT 39.4 10/23/2017    MCV 90 10/23/2017     10/23/2017         Past Medical History:   Diagnosis Date    Asthma     Cancer     breast    Fibromyalgia     Hypertension     Psoriasis vulgaris      Past Surgical History:   Procedure Laterality Date    APPENDECTOMY  1962    BREAST LUMPECTOMY  7-    breast cancer    BUNIONECTOMY  2010    EPIDURAL STEROID INJECTION N/A 8/11/2020    Procedure: LUMBAR L2/3 NANCY DIRECT REFERRAL;  Surgeon: Ion Bearden MD;  Location: [facility];  Service: Pain Management;  Laterality: N/A;  NEEDS CONSENT    KNEE ARTHROSCOPY  2004    right    KNEE SURGERY  10-13-14    right TKR    SHOULDER SURGERY  1982    right/had a bone infectioni    THYROIDECTOMY  1977    hyperparathyroid&thyroidectomy 2006     Social History     Socioeconomic History    Marital status:      Spouse name: Not on file    Number of children: Not on file    Years of education: Not on file    Highest education level: Not on file   Occupational History    Not on file   Social Needs    Financial resource strain: Not on file    Food insecurity     Worry: Not on file     Inability: Not on file     Transportation needs     Medical: Not on file     Non-medical: Not on file   Tobacco Use    Smoking status: Former Smoker     Quit date: 1999     Years since quittin.1    Smokeless tobacco: Never Used   Substance and Sexual Activity    Alcohol use: No    Drug use: No    Sexual activity: Never   Lifestyle    Physical activity     Days per week: Not on file     Minutes per session: Not on file    Stress: Not on file   Relationships    Social connections     Talks on phone: Not on file     Gets together: Not on file     Attends Denominational service: Not on file     Active member of club or organization: Not on file     Attends meetings of clubs or organizations: Not on file     Relationship status: Not on file   Other Topics Concern    Are you pregnant or think you may be? Not Asked    Breast-feeding Not Asked   Social History Narrative    Not on file     Family History   Problem Relation Age of Onset    Skin cancer Neg Hx     Psoriasis Neg Hx        Review of patient's allergies indicates:   Allergen Reactions    Atorvastatin Other (See Comments)    Rosuvastatin Other (See Comments)     Felt vibration/myalgia     Sulfamethoxazole-trimethoprim Swelling, Diarrhea and Other (See Comments)       Current Outpatient Medications   Medication Sig    albuterol (ACCUNEB) 0.63 mg/3 mL Nebu Take 0.63 mg by nebulization every 6 (six) hours as needed.    amLODIPine (NORVASC) 5 MG tablet TAKE 1 TABLET BY MOUTH DAILY PLEASE NOTE DOSE CHANGE TO THIS MEDICATION.    calcipotriene (DOVONOX) 0.005 % cream     calcipotriene 0.005 % ointment     calcium carbonate-vitamin D3 250-125 mg 250-125 mg-unit Tab Take 1 tablet by mouth once daily.    cetirizine (ZYRTEC) 10 MG tablet Take 10 mg by mouth once daily.    fluticasone (FLOVENT HFA) 110 mcg/actuation inhaler Inhale 1 puff into the lungs 2 (two) times daily.    gabapentin (NEURONTIN) 100 MG capsule     irbesartan (AVAPRO) 300 MG tablet     levothyroxine  "(SYNTHROID) 125 MCG tablet Take 75 mcg by mouth once daily.     omeprazole (PRILOSEC) 20 MG capsule     desloratadine (CLARINEX) 5 mg tablet     fluocinonide (LIDEX) 0.05 % external solution Apply topically once daily. To scalp    irbesartan-hydrochlorothiazide (AVALIDE) 300-12.5 mg per tablet     VECTICAL 3 mcg/gram ointment AAA qd - bid for maintanence to under breast and stomach (Patient not taking: Reported on 9/8/2020)     No current facility-administered medications for this visit.        REVIEW OF SYSTEMS:    GENERAL:  No weight loss, malaise or fevers.  HEENT:   No recent changes in vision or hearing  NECK:  Negative for lumps, no difficulty with swallowing.  RESPIRATORY:  Negative for cough, wheezing or shortness of breath, patient denies any recent URI.  CARDIOVASCULAR:  Negative for chest pain, leg swelling or palpitations.  GI:  Negative for abdominal discomfort, blood in stools or black stools or change in bowel habits.  MUSCULOSKELETAL:  See HPI.  SKIN:  Negative for lesions, rash, and itching.  PSYCH:  No mood disorder or recent psychosocial stressors.  Patients sleep is not disturbed secondary to pain.  HEMATOLOGY/LYMPHOLOGY:  Negative for prolonged bleeding, bruising easily or swollen nodes.  Patient is not currently taking any anti-coagulants. H/O breast cancer.  ENDO: No history of diabetes or thyroid dysfunction  NEURO:   No history of headaches, syncope, paralysis, seizures or tremors.  All other reviewed and negative other than HPI.    OBJECTIVE:    /73   Pulse 71   Temp 98 °F (36.7 °C) (Oral)   Resp 18   Ht 5' 3" (1.6 m)   Wt 98 kg (216 lb)   LMP  (LMP Unknown)   BMI 38.26 kg/m²     PHYSICAL EXAMINATION:    GENERAL: Well appearing, in no acute distress, alert and oriented x3.  PSYCH:  Mood and affect appropriate.  SKIN: Skin color, texture, turgor normal, no rashes or lesions.  HEAD/FACE:  Normocephalic, atraumatic. Cranial nerves grossly intact.  NECK: There is pain to " palpation over the cervical paraspinous and trapezius muscles. Spurling Negative. No pain with neck flexion, extension, or lateral flexion. Mild facet loading bilaterally.   CV: RRR with palpation of the radial artery.  PULM: No evidence of respiratory difficulty, symmetric chest rise.  GI:  Soft and non-tender.  BACK: Straight leg raising in the supine position is negative to radicular pain. There is pain to palpation over the facet joints of the lumbar spine.  Limited range of motion with pain on flexion and extension. Positive facet loading bilaterally.  EXTREMITIES: Peripheral joint ROM is full and pain free without obvious instability or laxity in all four extremities. No deformities, edema, or skin discoloration.   MUSCULOSKELETAL: There is mild pain with palpation over the sacroiliac joints bilaterally.  FABERs test is negative.  FADIRs test is negative. 5/5 strength in right ankle with plantar and dorsiflexion. 4/5 strength in left ankle with plantar and dorsiflexion. 5/5 strength with right knee flexion and extension. 5/5 strength with left knee flexion and extension. 5/5 strength in right EHL, 5/5 strength in left EHL.  NEURO: Right patella reflex is 0.  Left is 1+.  Bilateral achilles 1+.  GAIT: Antalgic.    ASSESSMENT: 74 y.o. year old female with pain, consistent with     Encounter Diagnoses   Name Primary?    Lumbar radiculopathy Yes    Other chronic pain     Fibromyalgia        PLAN:     - Outside lumbar MRI reviewed with patient.    - We discussed lumbar MBB/RFA as her back pain is currently greater than leg pain, mainly in the morning.  She declined at this time.    - I will start her in PT to help with pain and fibromyalgia.  We discussed FRP which she declined.    - We also discussed lumbar XRAYs with flexion and extension in the future which she would like to hold off on.    - RTC in 6-8 weeks or sooner if needed.      The above plan and management options were discussed at length with  patient. Patient is in agreement with the above and verbalized understanding. It will be communicated with the referring physician via electronic record, fax, or mail.    Natividad Abbott  09/08/2020

## 2021-01-14 ENCOUNTER — OFFICE VISIT (OUTPATIENT)
Dept: CARDIOLOGY | Facility: CLINIC | Age: 76
End: 2021-01-14
Payer: MEDICARE

## 2021-01-14 VITALS
WEIGHT: 210.19 LBS | OXYGEN SATURATION: 98 % | DIASTOLIC BLOOD PRESSURE: 70 MMHG | HEART RATE: 75 BPM | HEIGHT: 63 IN | SYSTOLIC BLOOD PRESSURE: 144 MMHG | BODY MASS INDEX: 37.24 KG/M2

## 2021-01-14 DIAGNOSIS — E66.01 SEVERE OBESITY: ICD-10-CM

## 2021-01-14 DIAGNOSIS — E78.00 HYPERCHOLESTEROLEMIA: Primary | ICD-10-CM

## 2021-01-14 DIAGNOSIS — I10 ESSENTIAL HYPERTENSION: ICD-10-CM

## 2021-01-14 PROCEDURE — 93005 ELECTROCARDIOGRAM TRACING: CPT

## 2021-01-14 PROCEDURE — 1101F PR PT FALLS ASSESS DOC 0-1 FALLS W/OUT INJ PAST YR: ICD-10-PCS | Mod: CPTII,S$GLB,, | Performed by: INTERNAL MEDICINE

## 2021-01-14 PROCEDURE — 1101F PT FALLS ASSESS-DOCD LE1/YR: CPT | Mod: CPTII,S$GLB,, | Performed by: INTERNAL MEDICINE

## 2021-01-14 PROCEDURE — 1126F PR PAIN SEVERITY QUANTIFIED, NO PAIN PRESENT: ICD-10-PCS | Mod: S$GLB,,, | Performed by: INTERNAL MEDICINE

## 2021-01-14 PROCEDURE — 99999 PR PBB SHADOW E&M-EST. PATIENT-LVL III: CPT | Mod: PBBFAC,,, | Performed by: INTERNAL MEDICINE

## 2021-01-14 PROCEDURE — 1126F AMNT PAIN NOTED NONE PRSNT: CPT | Mod: S$GLB,,, | Performed by: INTERNAL MEDICINE

## 2021-01-14 PROCEDURE — 1159F MED LIST DOCD IN RCRD: CPT | Mod: S$GLB,,, | Performed by: INTERNAL MEDICINE

## 2021-01-14 PROCEDURE — 1159F PR MEDICATION LIST DOCUMENTED IN MEDICAL RECORD: ICD-10-PCS | Mod: S$GLB,,, | Performed by: INTERNAL MEDICINE

## 2021-01-14 PROCEDURE — 93000 EKG 12-LEAD: ICD-10-PCS | Mod: S$GLB,,, | Performed by: INTERNAL MEDICINE

## 2021-01-14 PROCEDURE — 3288F PR FALLS RISK ASSESSMENT DOCUMENTED: ICD-10-PCS | Mod: CPTII,S$GLB,, | Performed by: INTERNAL MEDICINE

## 2021-01-14 PROCEDURE — 3077F SYST BP >= 140 MM HG: CPT | Mod: CPTII,S$GLB,, | Performed by: INTERNAL MEDICINE

## 2021-01-14 PROCEDURE — 3288F FALL RISK ASSESSMENT DOCD: CPT | Mod: CPTII,S$GLB,, | Performed by: INTERNAL MEDICINE

## 2021-01-14 PROCEDURE — 3077F PR MOST RECENT SYSTOLIC BLOOD PRESSURE >= 140 MM HG: ICD-10-PCS | Mod: CPTII,S$GLB,, | Performed by: INTERNAL MEDICINE

## 2021-01-14 PROCEDURE — 3078F DIAST BP <80 MM HG: CPT | Mod: CPTII,S$GLB,, | Performed by: INTERNAL MEDICINE

## 2021-01-14 PROCEDURE — 99203 PR OFFICE/OUTPT VISIT, NEW, LEVL III, 30-44 MIN: ICD-10-PCS | Mod: S$GLB,,, | Performed by: INTERNAL MEDICINE

## 2021-01-14 PROCEDURE — 93000 ELECTROCARDIOGRAM COMPLETE: CPT | Mod: S$GLB,,, | Performed by: INTERNAL MEDICINE

## 2021-01-14 PROCEDURE — 99999 PR PBB SHADOW E&M-EST. PATIENT-LVL III: ICD-10-PCS | Mod: PBBFAC,,, | Performed by: INTERNAL MEDICINE

## 2021-01-14 PROCEDURE — 99203 OFFICE O/P NEW LOW 30 MIN: CPT | Mod: S$GLB,,, | Performed by: INTERNAL MEDICINE

## 2021-01-14 PROCEDURE — 3078F PR MOST RECENT DIASTOLIC BLOOD PRESSURE < 80 MM HG: ICD-10-PCS | Mod: CPTII,S$GLB,, | Performed by: INTERNAL MEDICINE

## 2021-01-14 RX ORDER — EZETIMIBE 10 MG/1
10 TABLET ORAL DAILY
Qty: 90 TABLET | Refills: 3 | Status: SHIPPED | OUTPATIENT
Start: 2021-01-14 | End: 2022-02-10 | Stop reason: SDUPTHER

## 2021-01-14 RX ORDER — PRAVASTATIN SODIUM 40 MG/1
40 TABLET ORAL DAILY
Qty: 90 TABLET | Refills: 3 | Status: SHIPPED | OUTPATIENT
Start: 2021-01-14 | End: 2022-03-03 | Stop reason: SDUPTHER

## 2021-01-14 RX ORDER — ERGOCALCIFEROL 1.25 MG/1
50000 CAPSULE ORAL
COMMUNITY
Start: 2020-11-29 | End: 2022-08-25

## 2021-01-14 RX ORDER — ASCORBIC ACID 500 MG
500 TABLET ORAL
COMMUNITY

## 2021-01-14 RX ORDER — CHOLECALCIFEROL (VITAMIN D3) 25 MCG
1000 TABLET ORAL
COMMUNITY

## 2021-02-09 ENCOUNTER — OFFICE VISIT (OUTPATIENT)
Dept: URGENT CARE | Facility: CLINIC | Age: 76
End: 2021-02-09
Payer: MEDICARE

## 2021-02-09 VITALS
SYSTOLIC BLOOD PRESSURE: 130 MMHG | HEART RATE: 66 BPM | DIASTOLIC BLOOD PRESSURE: 68 MMHG | TEMPERATURE: 98 F | HEIGHT: 63 IN | WEIGHT: 210 LBS | BODY MASS INDEX: 37.21 KG/M2 | RESPIRATION RATE: 18 BRPM | OXYGEN SATURATION: 95 %

## 2021-02-09 DIAGNOSIS — J44.1 COPD WITH ACUTE EXACERBATION: Primary | ICD-10-CM

## 2021-02-09 DIAGNOSIS — M54.9 ACUTE RIGHT-SIDED BACK PAIN, UNSPECIFIED BACK LOCATION: ICD-10-CM

## 2021-02-09 DIAGNOSIS — Z11.59 SCREENING FOR VIRAL DISEASE: ICD-10-CM

## 2021-02-09 PROBLEM — Z86.39 HISTORY OF HYPERPARATHYROIDISM: Status: ACTIVE | Noted: 2019-06-05

## 2021-02-09 PROBLEM — B37.2 INTERTRIGINOUS CANDIDIASIS: Status: ACTIVE | Noted: 2018-11-01

## 2021-02-09 PROBLEM — E21.0 PRIMARY HYPERPARATHYROIDISM: Status: ACTIVE | Noted: 2020-09-04

## 2021-02-09 PROBLEM — Z91.81 HISTORY OF FALLING: Status: ACTIVE | Noted: 2020-09-15

## 2021-02-09 PROBLEM — M85.80 OSTEOPENIA: Status: ACTIVE | Noted: 2019-06-28

## 2021-02-09 PROBLEM — G62.0 CHEMOTHERAPY-INDUCED NEUROPATHY: Status: ACTIVE | Noted: 2019-06-05

## 2021-02-09 PROBLEM — J44.9 CHRONIC OBSTRUCTIVE PULMONARY DISEASE: Status: ACTIVE | Noted: 2020-09-15

## 2021-02-09 PROBLEM — T45.1X5A CHEMOTHERAPY-INDUCED NEUROPATHY: Status: ACTIVE | Noted: 2019-06-05

## 2021-02-09 PROBLEM — N95.9 MENOPAUSAL PROBLEM: Status: ACTIVE | Noted: 2019-06-05

## 2021-02-09 PROBLEM — Z85.3 PERSONAL HISTORY OF MALIGNANT NEOPLASM OF BREAST: Status: ACTIVE | Noted: 2018-04-26

## 2021-02-09 PROBLEM — M15.9 PRIMARY OSTEOARTHRITIS INVOLVING MULTIPLE JOINTS: Status: ACTIVE | Noted: 2018-04-26

## 2021-02-09 PROBLEM — E78.00 PURE HYPERCHOLESTEROLEMIA: Status: ACTIVE | Noted: 2018-04-26

## 2021-02-09 PROBLEM — Z78.9 STATIN INTOLERANCE: Status: ACTIVE | Noted: 2019-06-05

## 2021-02-09 PROBLEM — R54 FRAILTY: Status: ACTIVE | Noted: 2020-09-15

## 2021-02-09 PROBLEM — E21.1 HYPERPARATHYROIDISM , SECONDARY, NON-RENAL: Status: ACTIVE | Noted: 2020-09-04

## 2021-02-09 PROBLEM — R73.09 OTHER ABNORMAL GLUCOSE: Status: ACTIVE | Noted: 2020-09-15

## 2021-02-09 PROBLEM — M47.9 SPONDYLOSIS: Status: ACTIVE | Noted: 2020-09-15

## 2021-02-09 PROBLEM — M15.0 PRIMARY OSTEOARTHRITIS INVOLVING MULTIPLE JOINTS: Status: ACTIVE | Noted: 2018-04-26

## 2021-02-09 PROBLEM — I25.10 ASHD (ARTERIOSCLEROTIC HEART DISEASE): Status: ACTIVE | Noted: 2018-04-26

## 2021-02-09 PROBLEM — E78.5 HYPERLIPIDEMIA WITH TARGET LDL LESS THAN 70: Status: ACTIVE | Noted: 2019-06-20

## 2021-02-09 PROBLEM — M17.0 BILATERAL PRIMARY OSTEOARTHRITIS OF KNEE: Status: ACTIVE | Noted: 2020-09-15

## 2021-02-09 PROBLEM — M54.40 LUMBAGO OF MULTIPLE SITES IN SPINE WITH SCIATICA: Status: ACTIVE | Noted: 2018-11-01

## 2021-02-09 LAB
BILIRUB UR QL STRIP: NEGATIVE
CTP QC/QA: YES
GLUCOSE UR QL STRIP: NEGATIVE
KETONES UR QL STRIP: NEGATIVE
LEUKOCYTE ESTERASE UR QL STRIP: NEGATIVE
PH, POC UA: 6 (ref 5–8)
POC BLOOD, URINE: NEGATIVE
POC NITRATES, URINE: NEGATIVE
PROT UR QL STRIP: NEGATIVE
SARS-COV-2 RDRP RESP QL NAA+PROBE: NEGATIVE
SP GR UR STRIP: 1.01 (ref 1–1.03)
UROBILINOGEN UR STRIP-ACNC: NORMAL (ref 0.1–1.1)

## 2021-02-09 PROCEDURE — U0002 COVID-19 LAB TEST NON-CDC: HCPCS | Mod: QW,S$GLB,, | Performed by: NURSE PRACTITIONER

## 2021-02-09 PROCEDURE — 81003 URINALYSIS AUTO W/O SCOPE: CPT | Mod: QW,S$GLB,, | Performed by: NURSE PRACTITIONER

## 2021-02-09 PROCEDURE — 81003 POCT URINALYSIS, DIPSTICK, AUTOMATED, W/O SCOPE: ICD-10-PCS | Mod: QW,S$GLB,, | Performed by: NURSE PRACTITIONER

## 2021-02-09 PROCEDURE — 71046 XR CHEST PA AND LATERAL: ICD-10-PCS | Mod: S$GLB,,, | Performed by: RADIOLOGY

## 2021-02-09 PROCEDURE — 99214 PR OFFICE/OUTPT VISIT, EST, LEVL IV, 30-39 MIN: ICD-10-PCS | Mod: 25,S$GLB,, | Performed by: NURSE PRACTITIONER

## 2021-02-09 PROCEDURE — 71046 X-RAY EXAM CHEST 2 VIEWS: CPT | Mod: S$GLB,,, | Performed by: RADIOLOGY

## 2021-02-09 PROCEDURE — U0002: ICD-10-PCS | Mod: QW,S$GLB,, | Performed by: NURSE PRACTITIONER

## 2021-02-09 PROCEDURE — 99214 OFFICE O/P EST MOD 30 MIN: CPT | Mod: 25,S$GLB,, | Performed by: NURSE PRACTITIONER

## 2021-02-09 RX ORDER — PREDNISONE 20 MG/1
40 TABLET ORAL DAILY
Qty: 10 TABLET | Refills: 0 | Status: SHIPPED | OUTPATIENT
Start: 2021-02-09 | End: 2021-02-14

## 2021-02-09 RX ORDER — AZITHROMYCIN 250 MG/1
TABLET, FILM COATED ORAL
Qty: 6 TABLET | Refills: 0 | Status: SHIPPED | OUTPATIENT
Start: 2021-02-09 | End: 2021-03-15

## 2021-02-09 RX ORDER — ALBUTEROL SULFATE 0.83 MG/ML
2.5 SOLUTION RESPIRATORY (INHALATION) EVERY 6 HOURS PRN
Qty: 1 BOX | Refills: 0 | Status: SHIPPED | OUTPATIENT
Start: 2021-02-09 | End: 2022-02-09

## 2021-03-04 ENCOUNTER — OFFICE VISIT (OUTPATIENT)
Dept: URGENT CARE | Facility: CLINIC | Age: 76
End: 2021-03-04
Payer: MEDICARE

## 2021-03-04 VITALS
RESPIRATION RATE: 20 BRPM | DIASTOLIC BLOOD PRESSURE: 59 MMHG | SYSTOLIC BLOOD PRESSURE: 129 MMHG | OXYGEN SATURATION: 97 % | BODY MASS INDEX: 37.03 KG/M2 | HEART RATE: 76 BPM | HEIGHT: 63 IN | TEMPERATURE: 99 F | WEIGHT: 209 LBS

## 2021-03-04 DIAGNOSIS — B37.0 THRUSH, ORAL: Primary | ICD-10-CM

## 2021-03-04 PROCEDURE — 99213 PR OFFICE/OUTPT VISIT, EST, LEVL III, 20-29 MIN: ICD-10-PCS | Mod: S$GLB,,, | Performed by: NURSE PRACTITIONER

## 2021-03-04 PROCEDURE — 99213 OFFICE O/P EST LOW 20 MIN: CPT | Mod: S$GLB,,, | Performed by: NURSE PRACTITIONER

## 2021-03-04 RX ORDER — NYSTATIN 100000 [USP'U]/ML
4 SUSPENSION ORAL 4 TIMES DAILY
Qty: 160 ML | Refills: 0 | Status: SHIPPED | OUTPATIENT
Start: 2021-03-04 | End: 2021-03-14

## 2021-03-09 LAB
ALBUMIN SERPL-MCNC: 4 G/DL (ref 3.6–5.1)
ALBUMIN/GLOB SERPL: 1.7 (CALC) (ref 1–2.5)
ALP SERPL-CCNC: 140 U/L (ref 37–153)
ALT SERPL-CCNC: 24 U/L (ref 6–29)
AST SERPL-CCNC: 27 U/L (ref 10–35)
BILIRUB SERPL-MCNC: 0.4 MG/DL (ref 0.2–1.2)
BUN SERPL-MCNC: 20 MG/DL (ref 7–25)
BUN/CREAT SERPL: 21 (CALC) (ref 6–22)
CALCIUM SERPL-MCNC: 10.5 MG/DL (ref 8.6–10.4)
CHLORIDE SERPL-SCNC: 106 MMOL/L (ref 98–110)
CHOLEST SERPL-MCNC: 172 MG/DL
CHOLEST/HDLC SERPL: 2.5 (CALC)
CO2 SERPL-SCNC: 27 MMOL/L (ref 20–32)
CREAT SERPL-MCNC: 0.95 MG/DL (ref 0.6–0.93)
GFRSERPLBLD MDRD-ARVRAT: 58 ML/MIN/1.73M2
GLOBULIN SER CALC-MCNC: 2.4 G/DL (CALC) (ref 1.9–3.7)
GLUCOSE SERPL-MCNC: 105 MG/DL (ref 65–99)
HDLC SERPL-MCNC: 68 MG/DL
LDLC SERPL CALC-MCNC: 85 MG/DL (CALC)
NONHDLC SERPL-MCNC: 104 MG/DL (CALC)
POTASSIUM SERPL-SCNC: 4.4 MMOL/L (ref 3.5–5.3)
PROT SERPL-MCNC: 6.4 G/DL (ref 6.1–8.1)
SODIUM SERPL-SCNC: 143 MMOL/L (ref 135–146)
TRIGL SERPL-MCNC: 92 MG/DL

## 2021-03-15 ENCOUNTER — OFFICE VISIT (OUTPATIENT)
Dept: CARDIOLOGY | Facility: CLINIC | Age: 76
End: 2021-03-15
Payer: MEDICARE

## 2021-03-15 VITALS
SYSTOLIC BLOOD PRESSURE: 130 MMHG | BODY MASS INDEX: 37.08 KG/M2 | WEIGHT: 209.31 LBS | HEART RATE: 62 BPM | DIASTOLIC BLOOD PRESSURE: 60 MMHG

## 2021-03-15 DIAGNOSIS — E78.00 HYPERCHOLESTEROLEMIA: Primary | ICD-10-CM

## 2021-03-15 DIAGNOSIS — E66.01 SEVERE OBESITY: ICD-10-CM

## 2021-03-15 DIAGNOSIS — I10 ESSENTIAL HYPERTENSION: ICD-10-CM

## 2021-03-15 PROCEDURE — 3288F PR FALLS RISK ASSESSMENT DOCUMENTED: ICD-10-PCS | Mod: CPTII,S$GLB,, | Performed by: INTERNAL MEDICINE

## 2021-03-15 PROCEDURE — 1126F AMNT PAIN NOTED NONE PRSNT: CPT | Mod: S$GLB,,, | Performed by: INTERNAL MEDICINE

## 2021-03-15 PROCEDURE — 99999 PR PBB SHADOW E&M-EST. PATIENT-LVL IV: CPT | Mod: PBBFAC,,, | Performed by: INTERNAL MEDICINE

## 2021-03-15 PROCEDURE — 99999 PR PBB SHADOW E&M-EST. PATIENT-LVL IV: ICD-10-PCS | Mod: PBBFAC,,, | Performed by: INTERNAL MEDICINE

## 2021-03-15 PROCEDURE — 3078F DIAST BP <80 MM HG: CPT | Mod: CPTII,S$GLB,, | Performed by: INTERNAL MEDICINE

## 2021-03-15 PROCEDURE — 1126F PR PAIN SEVERITY QUANTIFIED, NO PAIN PRESENT: ICD-10-PCS | Mod: S$GLB,,, | Performed by: INTERNAL MEDICINE

## 2021-03-15 PROCEDURE — 1159F PR MEDICATION LIST DOCUMENTED IN MEDICAL RECORD: ICD-10-PCS | Mod: S$GLB,,, | Performed by: INTERNAL MEDICINE

## 2021-03-15 PROCEDURE — 1101F PT FALLS ASSESS-DOCD LE1/YR: CPT | Mod: CPTII,S$GLB,, | Performed by: INTERNAL MEDICINE

## 2021-03-15 PROCEDURE — 1101F PR PT FALLS ASSESS DOC 0-1 FALLS W/OUT INJ PAST YR: ICD-10-PCS | Mod: CPTII,S$GLB,, | Performed by: INTERNAL MEDICINE

## 2021-03-15 PROCEDURE — 3288F FALL RISK ASSESSMENT DOCD: CPT | Mod: CPTII,S$GLB,, | Performed by: INTERNAL MEDICINE

## 2021-03-15 PROCEDURE — 3078F PR MOST RECENT DIASTOLIC BLOOD PRESSURE < 80 MM HG: ICD-10-PCS | Mod: CPTII,S$GLB,, | Performed by: INTERNAL MEDICINE

## 2021-03-15 PROCEDURE — 1159F MED LIST DOCD IN RCRD: CPT | Mod: S$GLB,,, | Performed by: INTERNAL MEDICINE

## 2021-03-15 PROCEDURE — 3075F PR MOST RECENT SYSTOLIC BLOOD PRESS GE 130-139MM HG: ICD-10-PCS | Mod: CPTII,S$GLB,, | Performed by: INTERNAL MEDICINE

## 2021-03-15 PROCEDURE — 3075F SYST BP GE 130 - 139MM HG: CPT | Mod: CPTII,S$GLB,, | Performed by: INTERNAL MEDICINE

## 2021-03-15 PROCEDURE — 99213 OFFICE O/P EST LOW 20 MIN: CPT | Mod: S$GLB,,, | Performed by: INTERNAL MEDICINE

## 2021-03-15 PROCEDURE — 99213 PR OFFICE/OUTPT VISIT, EST, LEVL III, 20-29 MIN: ICD-10-PCS | Mod: S$GLB,,, | Performed by: INTERNAL MEDICINE

## 2021-03-15 RX ORDER — LEVOTHYROXINE SODIUM 75 UG/1
75 TABLET ORAL DAILY
COMMUNITY
Start: 2021-03-11 | End: 2022-03-11

## 2021-05-25 ENCOUNTER — HOSPITAL ENCOUNTER (OUTPATIENT)
Dept: RADIOLOGY | Facility: HOSPITAL | Age: 76
Discharge: HOME | End: 2021-05-25
Attending: INTERNAL MEDICINE
Payer: MEDICARE

## 2021-05-25 ENCOUNTER — OFFICE VISIT (OUTPATIENT)
Dept: RHEUMATOLOGY | Facility: CLINIC | Age: 76
End: 2021-05-25
Payer: MEDICARE

## 2021-05-25 VITALS
DIASTOLIC BLOOD PRESSURE: 62 MMHG | HEIGHT: 63 IN | BODY MASS INDEX: 37.54 KG/M2 | WEIGHT: 211.88 LBS | SYSTOLIC BLOOD PRESSURE: 124 MMHG | HEART RATE: 66 BPM

## 2021-05-25 DIAGNOSIS — M47.817 SPONDYLOSIS WITHOUT MYELOPATHY OR RADICULOPATHY, LUMBOSACRAL REGION: ICD-10-CM

## 2021-05-25 PROCEDURE — 72100 XR LUMBAR SPINE AP AND LATERAL: ICD-10-PCS | Mod: 26,,, | Performed by: RADIOLOGY

## 2021-05-25 PROCEDURE — 1126F AMNT PAIN NOTED NONE PRSNT: CPT | Mod: S$GLB,,, | Performed by: INTERNAL MEDICINE

## 2021-05-25 PROCEDURE — 1159F MED LIST DOCD IN RCRD: CPT | Mod: S$GLB,,, | Performed by: INTERNAL MEDICINE

## 2021-05-25 PROCEDURE — 99999 PR PBB SHADOW E&M-EST. PATIENT-LVL IV: CPT | Mod: PBBFAC,,, | Performed by: INTERNAL MEDICINE

## 2021-05-25 PROCEDURE — 72100 X-RAY EXAM L-S SPINE 2/3 VWS: CPT | Mod: TC

## 2021-05-25 PROCEDURE — 99999 PR PBB SHADOW E&M-EST. PATIENT-LVL IV: ICD-10-PCS | Mod: PBBFAC,,, | Performed by: INTERNAL MEDICINE

## 2021-05-25 PROCEDURE — 99205 PR OFFICE/OUTPT VISIT, NEW, LEVL V, 60-74 MIN: ICD-10-PCS | Mod: S$GLB,,, | Performed by: INTERNAL MEDICINE

## 2021-05-25 PROCEDURE — 99205 OFFICE O/P NEW HI 60 MIN: CPT | Mod: S$GLB,,, | Performed by: INTERNAL MEDICINE

## 2021-05-25 PROCEDURE — 72100 X-RAY EXAM L-S SPINE 2/3 VWS: CPT | Mod: 26,,, | Performed by: RADIOLOGY

## 2021-05-25 PROCEDURE — 77077 JOINT SURVEY SINGLE VIEW: CPT | Mod: TC

## 2021-05-25 PROCEDURE — 77077 JOINT SURVEY SINGLE VIEW: CPT | Mod: 26,,, | Performed by: RADIOLOGY

## 2021-05-25 PROCEDURE — 1126F PR PAIN SEVERITY QUANTIFIED, NO PAIN PRESENT: ICD-10-PCS | Mod: S$GLB,,, | Performed by: INTERNAL MEDICINE

## 2021-05-25 PROCEDURE — 1159F PR MEDICATION LIST DOCUMENTED IN MEDICAL RECORD: ICD-10-PCS | Mod: S$GLB,,, | Performed by: INTERNAL MEDICINE

## 2021-05-25 PROCEDURE — 77077 XR ARTHRITIS SURVEY: ICD-10-PCS | Mod: 26,,, | Performed by: RADIOLOGY

## 2021-05-27 DIAGNOSIS — M25.50 POLYARTHRALGIA: Primary | ICD-10-CM

## 2021-06-04 ENCOUNTER — TELEPHONE (OUTPATIENT)
Dept: RHEUMATOLOGY | Facility: CLINIC | Age: 76
End: 2021-06-04

## 2021-06-22 ENCOUNTER — TELEPHONE (OUTPATIENT)
Dept: RHEUMATOLOGY | Facility: CLINIC | Age: 76
End: 2021-06-22
Payer: MEDICARE

## 2021-06-25 LAB
APPEARANCE UR: CLEAR
BILIRUB UR QL STRIP: NEGATIVE
C3 SERPL-MCNC: 160 MG/DL (ref 83–193)
C4 SERPL-MCNC: 43 MG/DL (ref 15–57)
COLOR UR: YELLOW
CREAT UR-MCNC: 103 MG/DL (ref 20–275)
GLUCOSE UR QL STRIP: NEGATIVE
HGB UR QL STRIP: NEGATIVE
KETONES UR QL STRIP: NEGATIVE
LEUKOCYTE ESTERASE UR QL STRIP: NEGATIVE
NITRITE UR QL STRIP: NEGATIVE
PH UR STRIP: 6.5 [PH] (ref 5–8)
PROT UR QL STRIP: NEGATIVE
PROT UR-MCNC: 11 MG/DL (ref 5–24)
PROT/CREAT UR: 0.11 MG/MG CREAT (ref 0.02–0.16)
PROT/CREAT UR: 107 MG/G CREAT (ref 21–161)
SP GR UR STRIP: 1.02 (ref 1–1.03)

## 2021-06-27 DIAGNOSIS — M79.7 FIBROMYALGIA: Primary | ICD-10-CM

## 2021-06-28 DIAGNOSIS — M47.817 LUMBAR AND SACRAL OSTEOARTHRITIS: Primary | ICD-10-CM

## 2021-06-28 LAB
ALBUMIN SERPL ELPH-MCNC: 3.8 G/DL (ref 3.8–4.8)
ALPHA1 GLOB SERPL ELPH-MCNC: 0.4 G/DL (ref 0.2–0.3)
ALPHA2 GLOB SERPL ELPH-MCNC: 0.8 G/DL (ref 0.5–0.9)
B2 GLYCOPROT1 IGA SER-ACNC: <2 U/ML
B2 GLYCOPROT1 IGG SER-ACNC: <2 U/ML
B2 GLYCOPROT1 IGM SER-ACNC: <2 U/ML
BETA1 GLOB SERPL ELPH-MCNC: 0.4 G/DL (ref 0.4–0.6)
BETA2 GLOB SERPL ELPH-MCNC: 0.4 G/DL (ref 0.2–0.5)
CONFIRM APTT STACLOT: NEGATIVE
CRP SERPL-MCNC: 18.2 MG/L
ERYTHROCYTE [SEDIMENTATION RATE] IN BLOOD BY WESTERGREN METHOD: 6 MM/H
GAMMA GLOB SERPL ELPH-MCNC: 1 G/DL (ref 0.8–1.7)
LA 2 SCREEN W REFLEX-IMP: ABNORMAL
PROT PATTERN SERPL ELPH-IMP: ABNORMAL
PROT SERPL-MCNC: 6.8 G/DL (ref 6.1–8.1)
SCREEN APTT: 41 SECONDS
SCREEN DRVVT: 36 SECONDS

## 2021-06-29 ENCOUNTER — TELEPHONE (OUTPATIENT)
Dept: RHEUMATOLOGY | Facility: CLINIC | Age: 76
End: 2021-06-29

## 2021-06-29 LAB
ALP BONE CFR SERPL: 30 % (ref 28–66)
ALP INTEST CFR SERPL: 3 % (ref 1–24)
ALP LIVER CFR SERPL: 67 % (ref 25–69)
ALP PLAC CFR SERPL: 0 %
ALP SERPL-CCNC: 170 U/L (ref 37–153)
B2 GLYCOPROT1 IGA SER-ACNC: <2 U/ML
B2 GLYCOPROT1 IGG SER-ACNC: <2 U/ML
B2 GLYCOPROT1 IGM SER-ACNC: <2 U/ML
CARDIOLIPIN IGA SER IA-ACNC: <2 APL-U/ML
CARDIOLIPIN IGG SER IA-ACNC: <2 GPL-U/ML
CARDIOLIPIN IGM SER IA-ACNC: <2 MPL-U/ML
GAMMA INTERFERON BACKGROUND BLD IA-ACNC: 0.03 IU/ML
HBV DNA SERPL NAA+PROBE-ACNC: ABNORMAL IU/ML
HBV DNA SERPL NAA+PROBE-LOG IU: ABNORMAL LOG IU/ML
M TB IFN-G BLD-IMP: NEGATIVE
M TB IFN-G CD4+ BCKGRND COR BLD-ACNC: 0.02 IU/ML
MITOGEN IGNF BCKGRD COR BLD-ACNC: >10 IU/ML
PS IGA SER-ACNC: <20 U/ML
PS IGG SER-ACNC: <10 U/ML
PS IGM SER-ACNC: <25 U/ML
SERVICE CMNT-IMP: NORMAL
TB2 - NIL: <0 IU/ML

## 2021-07-01 ENCOUNTER — TELEPHONE (OUTPATIENT)
Dept: RHEUMATOLOGY | Facility: CLINIC | Age: 76
End: 2021-07-01

## 2021-07-23 LAB
ALBUMIN SERPL ELPH-MCNC: 4 G/DL (ref 3.8–4.8)
ALPHA1 GLOB SERPL ELPH-MCNC: 0.4 G/DL (ref 0.2–0.3)
ALPHA2 GLOB SERPL ELPH-MCNC: 0.9 G/DL (ref 0.5–0.9)
BETA1 GLOB SERPL ELPH-MCNC: 0.4 G/DL (ref 0.4–0.6)
BETA2 GLOB SERPL ELPH-MCNC: 0.3 G/DL (ref 0.2–0.5)
GAMMA GLOB SERPL ELPH-MCNC: 1 G/DL (ref 0.8–1.7)
PROT PATTERN SERPL ELPH-IMP: ABNORMAL
PROT SERPL-MCNC: 7 G/DL (ref 6.1–8.1)

## 2021-08-20 ENCOUNTER — OFFICE VISIT (OUTPATIENT)
Dept: RHEUMATOLOGY | Facility: CLINIC | Age: 76
End: 2021-08-20
Payer: MEDICARE

## 2021-08-20 VITALS
SYSTOLIC BLOOD PRESSURE: 133 MMHG | DIASTOLIC BLOOD PRESSURE: 62 MMHG | HEART RATE: 63 BPM | HEIGHT: 63 IN | WEIGHT: 215.81 LBS | BODY MASS INDEX: 38.24 KG/M2

## 2021-08-20 DIAGNOSIS — M79.7 FIBROMYALGIA: Primary | ICD-10-CM

## 2021-08-20 DIAGNOSIS — M47.817 LUMBAR AND SACRAL OSTEOARTHRITIS: ICD-10-CM

## 2021-08-20 PROCEDURE — 1126F AMNT PAIN NOTED NONE PRSNT: CPT | Mod: CPTII,S$GLB,, | Performed by: INTERNAL MEDICINE

## 2021-08-20 PROCEDURE — 99999 PR PBB SHADOW E&M-EST. PATIENT-LVL IV: CPT | Mod: PBBFAC,,, | Performed by: INTERNAL MEDICINE

## 2021-08-20 PROCEDURE — 3078F PR MOST RECENT DIASTOLIC BLOOD PRESSURE < 80 MM HG: ICD-10-PCS | Mod: CPTII,S$GLB,, | Performed by: INTERNAL MEDICINE

## 2021-08-20 PROCEDURE — 1159F PR MEDICATION LIST DOCUMENTED IN MEDICAL RECORD: ICD-10-PCS | Mod: CPTII,S$GLB,, | Performed by: INTERNAL MEDICINE

## 2021-08-20 PROCEDURE — 3078F DIAST BP <80 MM HG: CPT | Mod: CPTII,S$GLB,, | Performed by: INTERNAL MEDICINE

## 2021-08-20 PROCEDURE — 3075F PR MOST RECENT SYSTOLIC BLOOD PRESS GE 130-139MM HG: ICD-10-PCS | Mod: CPTII,S$GLB,, | Performed by: INTERNAL MEDICINE

## 2021-08-20 PROCEDURE — 99999 PR PBB SHADOW E&M-EST. PATIENT-LVL IV: ICD-10-PCS | Mod: PBBFAC,,, | Performed by: INTERNAL MEDICINE

## 2021-08-20 PROCEDURE — 1159F MED LIST DOCD IN RCRD: CPT | Mod: CPTII,S$GLB,, | Performed by: INTERNAL MEDICINE

## 2021-08-20 PROCEDURE — 99215 OFFICE O/P EST HI 40 MIN: CPT | Mod: S$GLB,,, | Performed by: INTERNAL MEDICINE

## 2021-08-20 PROCEDURE — 3075F SYST BP GE 130 - 139MM HG: CPT | Mod: CPTII,S$GLB,, | Performed by: INTERNAL MEDICINE

## 2021-08-20 PROCEDURE — 99215 PR OFFICE/OUTPT VISIT, EST, LEVL V, 40-54 MIN: ICD-10-PCS | Mod: S$GLB,,, | Performed by: INTERNAL MEDICINE

## 2021-08-20 PROCEDURE — 1126F PR PAIN SEVERITY QUANTIFIED, NO PAIN PRESENT: ICD-10-PCS | Mod: CPTII,S$GLB,, | Performed by: INTERNAL MEDICINE

## 2021-08-20 RX ORDER — BACLOFEN 10 MG/1
10 TABLET ORAL NIGHTLY
Qty: 30 TABLET | Refills: 11 | Status: SHIPPED | OUTPATIENT
Start: 2021-08-20 | End: 2022-08-25

## 2021-08-20 ASSESSMENT — ROUTINE ASSESSMENT OF PATIENT INDEX DATA (RAPID3)
PATIENT GLOBAL ASSESSMENT SCORE: 0
FATIGUE SCORE: 3.5
AM STIFFNESS SCORE: 1, YES
TOTAL RAPID3 SCORE: 1.5
PAIN SCORE: 3.5
PSYCHOLOGICAL DISTRESS SCORE: 1.1
MDHAQ FUNCTION SCORE: 0.3

## 2021-09-28 ENCOUNTER — TELEPHONE (OUTPATIENT)
Dept: RHEUMATOLOGY | Facility: CLINIC | Age: 76
End: 2021-09-28

## 2021-10-01 ENCOUNTER — TELEPHONE (OUTPATIENT)
Dept: PHYSICAL MEDICINE AND REHAB | Facility: CLINIC | Age: 76
End: 2021-10-01

## 2021-10-04 ENCOUNTER — TELEPHONE (OUTPATIENT)
Dept: URGENT CARE | Facility: CLINIC | Age: 76
End: 2021-10-04

## 2021-10-04 ENCOUNTER — OFFICE VISIT (OUTPATIENT)
Dept: URGENT CARE | Facility: CLINIC | Age: 76
End: 2021-10-04
Payer: MEDICARE

## 2021-10-04 VITALS
TEMPERATURE: 98 F | HEART RATE: 64 BPM | HEIGHT: 63 IN | DIASTOLIC BLOOD PRESSURE: 63 MMHG | RESPIRATION RATE: 16 BRPM | SYSTOLIC BLOOD PRESSURE: 154 MMHG | OXYGEN SATURATION: 97 % | WEIGHT: 215 LBS | BODY MASS INDEX: 38.09 KG/M2

## 2021-10-04 DIAGNOSIS — Z11.59 SCREENING FOR VIRAL DISEASE: Primary | ICD-10-CM

## 2021-10-04 DIAGNOSIS — R06.02 SHORTNESS OF BREATH: ICD-10-CM

## 2021-10-04 DIAGNOSIS — R35.0 FREQUENCY OF URINATION: ICD-10-CM

## 2021-10-04 DIAGNOSIS — J32.0 MAXILLARY SINUSITIS, UNSPECIFIED CHRONICITY: ICD-10-CM

## 2021-10-04 DIAGNOSIS — S69.91XA INJURY OF FINGER OF RIGHT HAND, INITIAL ENCOUNTER: ICD-10-CM

## 2021-10-04 LAB
BILIRUB UR QL STRIP: NEGATIVE
CTP QC/QA: YES
GLUCOSE UR QL STRIP: NEGATIVE
KETONES UR QL STRIP: NEGATIVE
LEUKOCYTE ESTERASE UR QL STRIP: NEGATIVE
PH, POC UA: 8 (ref 5–8)
POC BLOOD, URINE: NEGATIVE
POC NITRATES, URINE: NEGATIVE
PROT UR QL STRIP: NEGATIVE
SARS-COV-2 RDRP RESP QL NAA+PROBE: NEGATIVE
SP GR UR STRIP: 1.01 (ref 1–1.03)
UROBILINOGEN UR STRIP-ACNC: NORMAL (ref 0.1–1.1)

## 2021-10-04 PROCEDURE — 99215 OFFICE O/P EST HI 40 MIN: CPT | Mod: 25,S$GLB,CS, | Performed by: NURSE PRACTITIONER

## 2021-10-04 PROCEDURE — 73130 X-RAY EXAM OF HAND: CPT | Mod: RT,S$GLB,, | Performed by: RADIOLOGY

## 2021-10-04 PROCEDURE — 73130 XR HAND COMPLETE 3 VIEW RIGHT: ICD-10-PCS | Mod: RT,S$GLB,, | Performed by: RADIOLOGY

## 2021-10-04 PROCEDURE — 1159F MED LIST DOCD IN RCRD: CPT | Mod: CPTII,S$GLB,, | Performed by: NURSE PRACTITIONER

## 2021-10-04 PROCEDURE — 3078F DIAST BP <80 MM HG: CPT | Mod: CPTII,S$GLB,, | Performed by: NURSE PRACTITIONER

## 2021-10-04 PROCEDURE — U0002: ICD-10-PCS | Mod: QW,S$GLB,, | Performed by: NURSE PRACTITIONER

## 2021-10-04 PROCEDURE — 1160F RVW MEDS BY RX/DR IN RCRD: CPT | Mod: CPTII,S$GLB,, | Performed by: NURSE PRACTITIONER

## 2021-10-04 PROCEDURE — 81003 URINALYSIS AUTO W/O SCOPE: CPT | Mod: QW,S$GLB,, | Performed by: NURSE PRACTITIONER

## 2021-10-04 PROCEDURE — 71046 XR CHEST PA AND LATERAL: ICD-10-PCS | Mod: S$GLB,,, | Performed by: RADIOLOGY

## 2021-10-04 PROCEDURE — 81003 POCT URINALYSIS, DIPSTICK, AUTOMATED, W/O SCOPE: ICD-10-PCS | Mod: QW,S$GLB,, | Performed by: NURSE PRACTITIONER

## 2021-10-04 PROCEDURE — 3077F SYST BP >= 140 MM HG: CPT | Mod: CPTII,S$GLB,, | Performed by: NURSE PRACTITIONER

## 2021-10-04 PROCEDURE — 3078F PR MOST RECENT DIASTOLIC BLOOD PRESSURE < 80 MM HG: ICD-10-PCS | Mod: CPTII,S$GLB,, | Performed by: NURSE PRACTITIONER

## 2021-10-04 PROCEDURE — U0002 COVID-19 LAB TEST NON-CDC: HCPCS | Mod: QW,S$GLB,, | Performed by: NURSE PRACTITIONER

## 2021-10-04 PROCEDURE — 71046 X-RAY EXAM CHEST 2 VIEWS: CPT | Mod: S$GLB,,, | Performed by: RADIOLOGY

## 2021-10-04 PROCEDURE — 1160F PR REVIEW ALL MEDS BY PRESCRIBER/CLIN PHARMACIST DOCUMENTED: ICD-10-PCS | Mod: CPTII,S$GLB,, | Performed by: NURSE PRACTITIONER

## 2021-10-04 PROCEDURE — 3077F PR MOST RECENT SYSTOLIC BLOOD PRESSURE >= 140 MM HG: ICD-10-PCS | Mod: CPTII,S$GLB,, | Performed by: NURSE PRACTITIONER

## 2021-10-04 PROCEDURE — 1159F PR MEDICATION LIST DOCUMENTED IN MEDICAL RECORD: ICD-10-PCS | Mod: CPTII,S$GLB,, | Performed by: NURSE PRACTITIONER

## 2021-10-04 PROCEDURE — 99215 PR OFFICE/OUTPT VISIT, EST, LEVL V, 40-54 MIN: ICD-10-PCS | Mod: 25,S$GLB,CS, | Performed by: NURSE PRACTITIONER

## 2021-10-04 RX ORDER — AMOXICILLIN AND CLAVULANATE POTASSIUM 875; 125 MG/1; MG/1
1 TABLET, FILM COATED ORAL 2 TIMES DAILY
Qty: 20 TABLET | Refills: 0 | Status: SHIPPED | OUTPATIENT
Start: 2021-10-04 | End: 2021-10-14

## 2021-10-13 ENCOUNTER — OFFICE VISIT (OUTPATIENT)
Dept: URGENT CARE | Facility: CLINIC | Age: 76
End: 2021-10-13
Payer: MEDICARE

## 2021-10-13 VITALS
TEMPERATURE: 98 F | SYSTOLIC BLOOD PRESSURE: 139 MMHG | HEART RATE: 60 BPM | HEIGHT: 63 IN | OXYGEN SATURATION: 98 % | RESPIRATION RATE: 18 BRPM | BODY MASS INDEX: 36.5 KG/M2 | DIASTOLIC BLOOD PRESSURE: 63 MMHG | WEIGHT: 206 LBS

## 2021-10-13 DIAGNOSIS — R53.83 FATIGUE, UNSPECIFIED TYPE: Primary | ICD-10-CM

## 2021-10-13 DIAGNOSIS — R09.81 SINUS CONGESTION: ICD-10-CM

## 2021-10-13 LAB
CTP QC/QA: YES
SARS-COV-2 RDRP RESP QL NAA+PROBE: NEGATIVE

## 2021-10-13 PROCEDURE — 3078F PR MOST RECENT DIASTOLIC BLOOD PRESSURE < 80 MM HG: ICD-10-PCS | Mod: CPTII,S$GLB,, | Performed by: NURSE PRACTITIONER

## 2021-10-13 PROCEDURE — 3075F SYST BP GE 130 - 139MM HG: CPT | Mod: CPTII,S$GLB,, | Performed by: NURSE PRACTITIONER

## 2021-10-13 PROCEDURE — U0002: ICD-10-PCS | Mod: QW,S$GLB,, | Performed by: NURSE PRACTITIONER

## 2021-10-13 PROCEDURE — U0002 COVID-19 LAB TEST NON-CDC: HCPCS | Mod: QW,S$GLB,, | Performed by: NURSE PRACTITIONER

## 2021-10-13 PROCEDURE — 1159F MED LIST DOCD IN RCRD: CPT | Mod: CPTII,S$GLB,, | Performed by: NURSE PRACTITIONER

## 2021-10-13 PROCEDURE — 1159F PR MEDICATION LIST DOCUMENTED IN MEDICAL RECORD: ICD-10-PCS | Mod: CPTII,S$GLB,, | Performed by: NURSE PRACTITIONER

## 2021-10-13 PROCEDURE — 99213 OFFICE O/P EST LOW 20 MIN: CPT | Mod: S$GLB,CS,, | Performed by: NURSE PRACTITIONER

## 2021-10-13 PROCEDURE — 99213 PR OFFICE/OUTPT VISIT, EST, LEVL III, 20-29 MIN: ICD-10-PCS | Mod: S$GLB,CS,, | Performed by: NURSE PRACTITIONER

## 2021-10-13 PROCEDURE — 3078F DIAST BP <80 MM HG: CPT | Mod: CPTII,S$GLB,, | Performed by: NURSE PRACTITIONER

## 2021-10-13 PROCEDURE — 3075F PR MOST RECENT SYSTOLIC BLOOD PRESS GE 130-139MM HG: ICD-10-PCS | Mod: CPTII,S$GLB,, | Performed by: NURSE PRACTITIONER

## 2021-10-15 ENCOUNTER — TELEPHONE (OUTPATIENT)
Dept: CARDIOLOGY | Facility: CLINIC | Age: 76
End: 2021-10-15

## 2021-10-20 ENCOUNTER — OFFICE VISIT (OUTPATIENT)
Dept: CARDIOLOGY | Facility: CLINIC | Age: 76
End: 2021-10-20
Payer: MEDICARE

## 2021-10-20 VITALS
DIASTOLIC BLOOD PRESSURE: 58 MMHG | BODY MASS INDEX: 35.3 KG/M2 | WEIGHT: 199.31 LBS | SYSTOLIC BLOOD PRESSURE: 126 MMHG | OXYGEN SATURATION: 98 % | HEART RATE: 70 BPM

## 2021-10-20 DIAGNOSIS — J44.9 CHRONIC OBSTRUCTIVE PULMONARY DISEASE, UNSPECIFIED COPD TYPE: ICD-10-CM

## 2021-10-20 DIAGNOSIS — T45.1X5A CHEMOTHERAPY-INDUCED NEUROPATHY: ICD-10-CM

## 2021-10-20 DIAGNOSIS — G62.0 CHEMOTHERAPY-INDUCED NEUROPATHY: ICD-10-CM

## 2021-10-20 DIAGNOSIS — I10 PRIMARY HYPERTENSION: ICD-10-CM

## 2021-10-20 DIAGNOSIS — R00.2 PALPITATIONS: Primary | ICD-10-CM

## 2021-10-20 DIAGNOSIS — E21.0 PRIMARY HYPERPARATHYROIDISM: ICD-10-CM

## 2021-10-20 DIAGNOSIS — E78.00 PURE HYPERCHOLESTEROLEMIA: ICD-10-CM

## 2021-10-20 DIAGNOSIS — E66.01 SEVERE OBESITY: ICD-10-CM

## 2021-10-20 PROCEDURE — 1101F PR PT FALLS ASSESS DOC 0-1 FALLS W/OUT INJ PAST YR: ICD-10-PCS | Mod: CPTII,S$GLB,, | Performed by: INTERNAL MEDICINE

## 2021-10-20 PROCEDURE — 1159F PR MEDICATION LIST DOCUMENTED IN MEDICAL RECORD: ICD-10-PCS | Mod: CPTII,S$GLB,, | Performed by: INTERNAL MEDICINE

## 2021-10-20 PROCEDURE — 1160F RVW MEDS BY RX/DR IN RCRD: CPT | Mod: CPTII,S$GLB,, | Performed by: INTERNAL MEDICINE

## 2021-10-20 PROCEDURE — 1159F MED LIST DOCD IN RCRD: CPT | Mod: CPTII,S$GLB,, | Performed by: INTERNAL MEDICINE

## 2021-10-20 PROCEDURE — 1126F AMNT PAIN NOTED NONE PRSNT: CPT | Mod: CPTII,S$GLB,, | Performed by: INTERNAL MEDICINE

## 2021-10-20 PROCEDURE — 1126F PR PAIN SEVERITY QUANTIFIED, NO PAIN PRESENT: ICD-10-PCS | Mod: CPTII,S$GLB,, | Performed by: INTERNAL MEDICINE

## 2021-10-20 PROCEDURE — 99999 PR PBB SHADOW E&M-EST. PATIENT-LVL III: CPT | Mod: PBBFAC,,, | Performed by: INTERNAL MEDICINE

## 2021-10-20 PROCEDURE — 99214 PR OFFICE/OUTPT VISIT, EST, LEVL IV, 30-39 MIN: ICD-10-PCS | Mod: S$GLB,,, | Performed by: INTERNAL MEDICINE

## 2021-10-20 PROCEDURE — 3078F DIAST BP <80 MM HG: CPT | Mod: CPTII,S$GLB,, | Performed by: INTERNAL MEDICINE

## 2021-10-20 PROCEDURE — 3074F PR MOST RECENT SYSTOLIC BLOOD PRESSURE < 130 MM HG: ICD-10-PCS | Mod: CPTII,S$GLB,, | Performed by: INTERNAL MEDICINE

## 2021-10-20 PROCEDURE — 3078F PR MOST RECENT DIASTOLIC BLOOD PRESSURE < 80 MM HG: ICD-10-PCS | Mod: CPTII,S$GLB,, | Performed by: INTERNAL MEDICINE

## 2021-10-20 PROCEDURE — 3288F PR FALLS RISK ASSESSMENT DOCUMENTED: ICD-10-PCS | Mod: CPTII,S$GLB,, | Performed by: INTERNAL MEDICINE

## 2021-10-20 PROCEDURE — 1160F PR REVIEW ALL MEDS BY PRESCRIBER/CLIN PHARMACIST DOCUMENTED: ICD-10-PCS | Mod: CPTII,S$GLB,, | Performed by: INTERNAL MEDICINE

## 2021-10-20 PROCEDURE — 99999 PR PBB SHADOW E&M-EST. PATIENT-LVL III: ICD-10-PCS | Mod: PBBFAC,,, | Performed by: INTERNAL MEDICINE

## 2021-10-20 PROCEDURE — 3074F SYST BP LT 130 MM HG: CPT | Mod: CPTII,S$GLB,, | Performed by: INTERNAL MEDICINE

## 2021-10-20 PROCEDURE — 3288F FALL RISK ASSESSMENT DOCD: CPT | Mod: CPTII,S$GLB,, | Performed by: INTERNAL MEDICINE

## 2021-10-20 PROCEDURE — 99214 OFFICE O/P EST MOD 30 MIN: CPT | Mod: S$GLB,,, | Performed by: INTERNAL MEDICINE

## 2021-10-20 PROCEDURE — 1101F PT FALLS ASSESS-DOCD LE1/YR: CPT | Mod: CPTII,S$GLB,, | Performed by: INTERNAL MEDICINE

## 2021-10-20 RX ORDER — MELOXICAM 15 MG/1
15 TABLET ORAL DAILY
COMMUNITY
Start: 2021-06-29

## 2021-11-01 ENCOUNTER — HOSPITAL ENCOUNTER (OUTPATIENT)
Dept: CARDIOLOGY | Facility: OTHER | Age: 76
Discharge: HOME | End: 2021-11-01
Attending: INTERNAL MEDICINE
Payer: MEDICARE

## 2021-11-01 DIAGNOSIS — R00.2 PALPITATIONS: ICD-10-CM

## 2021-11-01 PROCEDURE — 93227 HOLTER MONITOR - 48 HOUR (CUPID ONLY): ICD-10-PCS | Mod: ,,, | Performed by: INTERNAL MEDICINE

## 2021-11-01 PROCEDURE — 93227 XTRNL ECG REC<48 HR R&I: CPT | Mod: ,,, | Performed by: INTERNAL MEDICINE

## 2021-11-01 PROCEDURE — 93226 XTRNL ECG REC<48 HR SCAN A/R: CPT

## 2021-11-05 LAB
OHS CV EVENT MONITOR DAY: 0
OHS CV HOLTER LENGTH DECIMAL HOURS: 48
OHS CV HOLTER LENGTH HOURS: 48
OHS CV HOLTER LENGTH MINUTES: 0
OHS CV HOLTER SINUS AVERAGE HR: 65
OHS CV HOLTER SINUS MAX HR: 110
OHS CV HOLTER SINUS MIN HR: 50

## 2021-12-23 LAB
ALBUMIN SERPL-MCNC: 4.1 G/DL (ref 3.6–5.1)
ALBUMIN/GLOB SERPL: 1.4 (CALC) (ref 1–2.5)
ALP SERPL-CCNC: 150 U/L (ref 37–153)
ALT SERPL-CCNC: 35 U/L (ref 6–29)
AST SERPL-CCNC: 41 U/L (ref 10–35)
BILIRUB SERPL-MCNC: 0.6 MG/DL (ref 0.2–1.2)
BUN SERPL-MCNC: 18 MG/DL (ref 7–25)
BUN/CREAT SERPL: ABNORMAL (CALC) (ref 6–22)
CALCIUM SERPL-MCNC: 10.5 MG/DL (ref 8.6–10.4)
CHLORIDE SERPL-SCNC: 103 MMOL/L (ref 98–110)
CHOLEST SERPL-MCNC: 166 MG/DL
CHOLEST/HDLC SERPL: 2.2 (CALC)
CO2 SERPL-SCNC: 31 MMOL/L (ref 20–32)
CREAT SERPL-MCNC: 0.75 MG/DL (ref 0.6–0.93)
GLOBULIN SER CALC-MCNC: 2.9 G/DL (CALC) (ref 1.9–3.7)
GLUCOSE SERPL-MCNC: 86 MG/DL (ref 65–99)
HDLC SERPL-MCNC: 75 MG/DL
LDLC SERPL CALC-MCNC: 76 MG/DL (CALC)
NONHDLC SERPL-MCNC: 91 MG/DL (CALC)
POTASSIUM SERPL-SCNC: 4.3 MMOL/L (ref 3.5–5.3)
PROT SERPL-MCNC: 7 G/DL (ref 6.1–8.1)
SODIUM SERPL-SCNC: 140 MMOL/L (ref 135–146)
TRIGL SERPL-MCNC: 66 MG/DL

## 2022-01-10 ENCOUNTER — OFFICE VISIT (OUTPATIENT)
Dept: URGENT CARE | Facility: CLINIC | Age: 77
End: 2022-01-10
Payer: MEDICARE

## 2022-01-10 VITALS
BODY MASS INDEX: 35.26 KG/M2 | RESPIRATION RATE: 18 BRPM | WEIGHT: 199 LBS | HEIGHT: 63 IN | HEART RATE: 73 BPM | SYSTOLIC BLOOD PRESSURE: 168 MMHG | OXYGEN SATURATION: 99 % | TEMPERATURE: 99 F | DIASTOLIC BLOOD PRESSURE: 71 MMHG

## 2022-01-10 DIAGNOSIS — I10 ELEVATED BLOOD PRESSURE READING IN OFFICE WITH DIAGNOSIS OF HYPERTENSION: ICD-10-CM

## 2022-01-10 DIAGNOSIS — R06.02 SHORTNESS OF BREATH: ICD-10-CM

## 2022-01-10 DIAGNOSIS — R53.83 FATIGUE, UNSPECIFIED TYPE: Primary | ICD-10-CM

## 2022-01-10 LAB
CTP QC/QA: YES
SARS-COV-2 RDRP RESP QL NAA+PROBE: NEGATIVE

## 2022-01-10 PROCEDURE — 3078F DIAST BP <80 MM HG: CPT | Mod: CPTII,S$GLB,, | Performed by: NURSE PRACTITIONER

## 2022-01-10 PROCEDURE — 99213 PR OFFICE/OUTPT VISIT, EST, LEVL III, 20-29 MIN: ICD-10-PCS | Mod: S$GLB,,, | Performed by: NURSE PRACTITIONER

## 2022-01-10 PROCEDURE — 1159F PR MEDICATION LIST DOCUMENTED IN MEDICAL RECORD: ICD-10-PCS | Mod: CPTII,S$GLB,, | Performed by: NURSE PRACTITIONER

## 2022-01-10 PROCEDURE — 3077F SYST BP >= 140 MM HG: CPT | Mod: CPTII,S$GLB,, | Performed by: NURSE PRACTITIONER

## 2022-01-10 PROCEDURE — 3077F PR MOST RECENT SYSTOLIC BLOOD PRESSURE >= 140 MM HG: ICD-10-PCS | Mod: CPTII,S$GLB,, | Performed by: NURSE PRACTITIONER

## 2022-01-10 PROCEDURE — 99213 OFFICE O/P EST LOW 20 MIN: CPT | Mod: S$GLB,,, | Performed by: NURSE PRACTITIONER

## 2022-01-10 PROCEDURE — 71046 X-RAY EXAM CHEST 2 VIEWS: CPT | Mod: S$GLB,,, | Performed by: RADIOLOGY

## 2022-01-10 PROCEDURE — U0002 COVID-19 LAB TEST NON-CDC: HCPCS | Mod: QW,S$GLB,, | Performed by: NURSE PRACTITIONER

## 2022-01-10 PROCEDURE — 1159F MED LIST DOCD IN RCRD: CPT | Mod: CPTII,S$GLB,, | Performed by: NURSE PRACTITIONER

## 2022-01-10 PROCEDURE — 71046 XR CHEST PA AND LATERAL: ICD-10-PCS | Mod: S$GLB,,, | Performed by: RADIOLOGY

## 2022-01-10 PROCEDURE — 3078F PR MOST RECENT DIASTOLIC BLOOD PRESSURE < 80 MM HG: ICD-10-PCS | Mod: CPTII,S$GLB,, | Performed by: NURSE PRACTITIONER

## 2022-01-10 PROCEDURE — U0002: ICD-10-PCS | Mod: QW,S$GLB,, | Performed by: NURSE PRACTITIONER

## 2022-01-10 PROCEDURE — 1160F PR REVIEW ALL MEDS BY PRESCRIBER/CLIN PHARMACIST DOCUMENTED: ICD-10-PCS | Mod: CPTII,S$GLB,, | Performed by: NURSE PRACTITIONER

## 2022-01-10 PROCEDURE — 1160F RVW MEDS BY RX/DR IN RCRD: CPT | Mod: CPTII,S$GLB,, | Performed by: NURSE PRACTITIONER

## 2022-01-10 NOTE — PATIENT INSTRUCTIONS
Rest.  Increase fluids.  Change positions slowly.  Follow up with PCP.  Go to the ER for any worsening symptoms including headache, visual changes, uncontrolled vomiting, uncontrolled dizziness, weakness, numbness, chest pain, or shortness of breath    Please be aware your blood pressure was slightly elevated today -  Make sure to take your blood pressure medicines, eat a low salt diet and recheck your blood pressure to make sure it is not getting too elevated ( greater than 160/100). Also make sure to let your doctor know about the elevated reading.    You must understand that you've received an Urgent Care treatment only and that you may be released before all your medical problems are known or treated. You, the patient, will arrange for follow up care as instructed.  Follow up with your PCP or specialty clinic as directed in the next 1-2 weeks if not improved or as needed.  You can call [phone] to schedule an appointment with the appropriate provider.  If your condition worsens we recommend that you receive another evaluation at the emergency room immediately or contact your primary medical clinics after hours call service to discuss your concerns.  Please return here or go to the Emergency Department for any concerns or worsening of condition.

## 2022-01-10 NOTE — PROGRESS NOTES
"Subjective:       Patient ID: Adrisivakumar Rice is a 76 y.o. female.    Vitals:  height is 5' 3" (1.6 m) and weight is 90.3 kg (199 lb). Her temperature is 98.6 °F (37 °C). Her blood pressure is 168/71 (abnormal) and her pulse is 73. Her respiration is 18 and oxygen saturation is 99%.     Chief Complaint: Shortness of Breath (Sore chest ) and body soreness     Pt wants top talk to a provider before any testing. Body mass index is 35.25 kg/m².      Shortness of Breath    Other  Chronicity: body pain. The current episode started in the past 7 days (4 week now ).     ROS    Objective:      Physical Exam   Constitutional: She is oriented to person, place, and time. She appears well-developed and well-nourished. She is cooperative.  Non-toxic appearance. She does not appear ill. No distress.      Comments:Appears well obesity  HENT:   Head: Normocephalic and atraumatic.   Ears:   Right Ear: Hearing, tympanic membrane, external ear and ear canal normal.   Left Ear: Hearing, tympanic membrane, external ear and ear canal normal.   Nose: Nose normal. No mucosal edema, rhinorrhea or nasal deformity. No epistaxis. Right sinus exhibits no maxillary sinus tenderness and no frontal sinus tenderness. Left sinus exhibits no maxillary sinus tenderness and no frontal sinus tenderness.   Mouth/Throat: Uvula is midline, oropharynx is clear and moist and mucous membranes are normal. No trismus in the jaw. Normal dentition. No uvula swelling. No posterior oropharyngeal erythema.   Eyes: Conjunctivae and lids are normal. Right eye exhibits no discharge. Left eye exhibits no discharge. No scleral icterus.   Neck: Trachea normal and phonation normal. Neck supple.   Cardiovascular: Normal rate, regular rhythm, normal heart sounds, intact distal pulses and normal pulses.   Pulmonary/Chest: Effort normal and breath sounds normal. No respiratory distress.   Lungs clear bilaterally  No acute resp distress  O2 99% on RA    Comments: Lungs " clear bilaterally  No acute resp distress  O2 99% on RA    Abdominal: Normal appearance and bowel sounds are normal. She exhibits no distension and no mass. Soft. There is no abdominal tenderness.   Musculoskeletal: Normal range of motion.         General: No deformity or edema. Normal range of motion.   Neurological: She is alert and oriented to person, place, and time. She exhibits normal muscle tone. Coordination normal.   Skin: Skin is warm, dry, intact, not diaphoretic and not pale.   Psychiatric: She has a normal mood and affect. Her speech is normal and behavior is normal. Judgment and thought content normal.   Nursing note and vitals reviewed.        Results for orders placed or performed in visit on 01/10/22   POCT COVID-19 Rapid Screening   Result Value Ref Range    POC Rapid COVID Negative Negative     Acceptable Yes      EKG: normal EKG, normal sinus rhythm, unchanged from previous tracings, sinus bradycardia. No ST elevation. 57 BPM    X-Ray Chest PA And Lateral    Result Date: 1/10/2022  EXAMINATION: XR CHEST PA AND LATERAL CLINICAL HISTORY: Shortness of breath TECHNIQUE: PA and lateral views of the chest were performed. COMPARISON: 10/04/2021. FINDINGS: The trachea is unremarkable.  There are calcifications of the aortic knob.  The cardiomediastinal silhouette is within normal limits.  The hemidiaphragms are unremarkable.  There are no pleural effusions.  There is no evidence of a pneumothorax.  There is no evidence of pneumomediastinum.  No airspace opacity is present.  There are degenerative changes in the osseous structures.     No acute process. Electronically signed by: Karl Calderón MD Date:    01/10/2022 Time:    16:12  Assessment:       1. Shortness of breath    2. Elevated blood pressure reading in office with diagnosis of hypertension          Plan:         Shortness of breath  -     X-Ray Chest PA And Lateral; Future; Expected date: 01/10/2022  -     POCT COVID-19 Rapid  Screening  -     IN OFFICE EKG 12-LEAD (to Muse)    Elevated blood pressure reading in office with diagnosis of hypertension      Patient Instructions   Rest.  Increase fluids.  Change positions slowly.  Follow up with PCP.  Go to the ER for any worsening symptoms including headache, visual changes, uncontrolled vomiting, uncontrolled dizziness, weakness, numbness, chest pain, or shortness of breath    Please be aware your blood pressure was slightly elevated today -  Make sure to take your blood pressure medicines, eat a low salt diet and recheck your blood pressure to make sure it is not getting too elevated ( greater than 160/100). Also make sure to let your doctor know about the elevated reading.    You must understand that you've received an Urgent Care treatment only and that you may be released before all your medical problems are known or treated. You, the patient, will arrange for follow up care as instructed.  Follow up with your PCP or specialty clinic as directed in the next 1-2 weeks if not improved or as needed.  You can call [phone] to schedule an appointment with the appropriate provider.  If your condition worsens we recommend that you receive another evaluation at the emergency room immediately or contact your primary medical clinics after hours call service to discuss your concerns.  Please return here or go to the Emergency Department for any concerns or worsening of condition.

## 2022-01-20 ENCOUNTER — OFFICE VISIT (OUTPATIENT)
Dept: CARDIOLOGY | Facility: CLINIC | Age: 77
End: 2022-01-20
Payer: MEDICARE

## 2022-01-20 VITALS
HEART RATE: 70 BPM | BODY MASS INDEX: 35.41 KG/M2 | SYSTOLIC BLOOD PRESSURE: 126 MMHG | DIASTOLIC BLOOD PRESSURE: 58 MMHG | WEIGHT: 199.88 LBS | OXYGEN SATURATION: 97 %

## 2022-01-20 DIAGNOSIS — I10 PRIMARY HYPERTENSION: ICD-10-CM

## 2022-01-20 DIAGNOSIS — E78.00 PURE HYPERCHOLESTEROLEMIA: ICD-10-CM

## 2022-01-20 DIAGNOSIS — R00.2 PALPITATIONS: Primary | ICD-10-CM

## 2022-01-20 DIAGNOSIS — T45.1X5A CHEMOTHERAPY-INDUCED NEUROPATHY: ICD-10-CM

## 2022-01-20 DIAGNOSIS — G62.0 CHEMOTHERAPY-INDUCED NEUROPATHY: ICD-10-CM

## 2022-01-20 DIAGNOSIS — J44.9 CHRONIC OBSTRUCTIVE PULMONARY DISEASE, UNSPECIFIED COPD TYPE: ICD-10-CM

## 2022-01-20 DIAGNOSIS — E66.01 SEVERE OBESITY: ICD-10-CM

## 2022-01-20 DIAGNOSIS — C50.111 MALIGNANT NEOPLASM OF CENTRAL PORTION OF RIGHT FEMALE BREAST, UNSPECIFIED ESTROGEN RECEPTOR STATUS: ICD-10-CM

## 2022-01-20 PROCEDURE — 1101F PT FALLS ASSESS-DOCD LE1/YR: CPT | Mod: CPTII,S$GLB,, | Performed by: INTERNAL MEDICINE

## 2022-01-20 PROCEDURE — 99999 PR PBB SHADOW E&M-EST. PATIENT-LVL IV: ICD-10-PCS | Mod: PBBFAC,,, | Performed by: INTERNAL MEDICINE

## 2022-01-20 PROCEDURE — 1160F RVW MEDS BY RX/DR IN RCRD: CPT | Mod: CPTII,S$GLB,, | Performed by: INTERNAL MEDICINE

## 2022-01-20 PROCEDURE — 99213 PR OFFICE/OUTPT VISIT, EST, LEVL III, 20-29 MIN: ICD-10-PCS | Mod: S$GLB,,, | Performed by: INTERNAL MEDICINE

## 2022-01-20 PROCEDURE — 3074F PR MOST RECENT SYSTOLIC BLOOD PRESSURE < 130 MM HG: ICD-10-PCS | Mod: CPTII,S$GLB,, | Performed by: INTERNAL MEDICINE

## 2022-01-20 PROCEDURE — 3078F DIAST BP <80 MM HG: CPT | Mod: CPTII,S$GLB,, | Performed by: INTERNAL MEDICINE

## 2022-01-20 PROCEDURE — 99999 PR PBB SHADOW E&M-EST. PATIENT-LVL IV: CPT | Mod: PBBFAC,,, | Performed by: INTERNAL MEDICINE

## 2022-01-20 PROCEDURE — 3288F PR FALLS RISK ASSESSMENT DOCUMENTED: ICD-10-PCS | Mod: CPTII,S$GLB,, | Performed by: INTERNAL MEDICINE

## 2022-01-20 PROCEDURE — 1126F AMNT PAIN NOTED NONE PRSNT: CPT | Mod: CPTII,S$GLB,, | Performed by: INTERNAL MEDICINE

## 2022-01-20 PROCEDURE — 1159F PR MEDICATION LIST DOCUMENTED IN MEDICAL RECORD: ICD-10-PCS | Mod: CPTII,S$GLB,, | Performed by: INTERNAL MEDICINE

## 2022-01-20 PROCEDURE — 1159F MED LIST DOCD IN RCRD: CPT | Mod: CPTII,S$GLB,, | Performed by: INTERNAL MEDICINE

## 2022-01-20 PROCEDURE — 3074F SYST BP LT 130 MM HG: CPT | Mod: CPTII,S$GLB,, | Performed by: INTERNAL MEDICINE

## 2022-01-20 PROCEDURE — 1101F PR PT FALLS ASSESS DOC 0-1 FALLS W/OUT INJ PAST YR: ICD-10-PCS | Mod: CPTII,S$GLB,, | Performed by: INTERNAL MEDICINE

## 2022-01-20 PROCEDURE — 1160F PR REVIEW ALL MEDS BY PRESCRIBER/CLIN PHARMACIST DOCUMENTED: ICD-10-PCS | Mod: CPTII,S$GLB,, | Performed by: INTERNAL MEDICINE

## 2022-01-20 PROCEDURE — 3288F FALL RISK ASSESSMENT DOCD: CPT | Mod: CPTII,S$GLB,, | Performed by: INTERNAL MEDICINE

## 2022-01-20 PROCEDURE — 99213 OFFICE O/P EST LOW 20 MIN: CPT | Mod: S$GLB,,, | Performed by: INTERNAL MEDICINE

## 2022-01-20 PROCEDURE — 1126F PR PAIN SEVERITY QUANTIFIED, NO PAIN PRESENT: ICD-10-PCS | Mod: CPTII,S$GLB,, | Performed by: INTERNAL MEDICINE

## 2022-01-20 PROCEDURE — 3078F PR MOST RECENT DIASTOLIC BLOOD PRESSURE < 80 MM HG: ICD-10-PCS | Mod: CPTII,S$GLB,, | Performed by: INTERNAL MEDICINE

## 2022-01-20 NOTE — PROGRESS NOTES
[facility]    Chief Complaint  Chief Complaint   Patient presents with    Hyperlipidemia       HPI:    No new issues.  She has been doing well.  Rare palpitations.    Medications  Current Outpatient Medications   Medication Sig Dispense Refill    albuterol (ACCUNEB) 0.63 mg/3 mL Nebu Take 0.63 mg by nebulization every 6 (six) hours as needed.      albuterol (PROVENTIL) 2.5 mg /3 mL (0.083 %) nebulizer solution Take 3 mLs (2.5 mg total) by nebulization every 6 (six) hours as needed for Wheezing or Shortness of Breath. Rescue 1 Box 0    amLODIPine (NORVASC) 5 MG tablet Take 5 mg by mouth once daily.       ascorbic acid, vitamin C, (VITAMIN C) 500 MG tablet Take 500 mg by mouth.      baclofen (LIORESAL) 10 MG tablet Take 1 tablet (10 mg total) by mouth every evening. (Patient not taking: Reported on 1/10/2022) 30 tablet 11    calcipotriene (DOVONOX) 0.005 % cream   1    calcipotriene 0.005 % ointment   1    calcium carbonate-vitamin D3 250-125 mg 250-125 mg-unit Tab Take 1 tablet by mouth once daily. 30 tablet 11    cetirizine (ZYRTEC) 10 MG tablet Take 10 mg by mouth once daily.      ergocalciferol (ERGOCALCIFEROL) 50,000 unit Cap Take 50,000 Units by mouth every 30 days.      ezetimibe (ZETIA) 10 mg tablet Take 1 tablet (10 mg total) by mouth once daily. 90 tablet 3    fluticasone (FLOVENT HFA) 110 mcg/actuation inhaler Inhale 1 puff into the lungs 2 (two) times daily.      gabapentin (NEURONTIN) 100 MG capsule       irbesartan (AVAPRO) 300 MG tablet Take 300 mg by mouth once daily.       levothyroxine (SYNTHROID) 75 MCG tablet Take 75 mcg by mouth once daily.      meloxicam (MOBIC) 15 MG tablet Take 15 mg by mouth once daily.      omeprazole (PRILOSEC) 20 MG capsule       pravastatin (PRAVACHOL) 40 MG tablet Take 1 tablet (40 mg total) by mouth once daily. 90 tablet 3    VECTICAL 3 mcg/gram ointment AAA qd - bid for maintanence to under breast and stomach (Patient not taking:  Reported on 2020) 100 g 3    vitamin D (VITAMIN D3) 1000 units Tab Take 1,000 Units by mouth.       No current facility-administered medications for this visit.        History  Past Medical History:   Diagnosis Date    Asthma     Breast cancer     Fibromyalgia     Hypertension     Psoriasis vulgaris      Past Surgical History:   Procedure Laterality Date    APPENDECTOMY      BREAST LUMPECTOMY  7-    breast cancer    BUNIONECTOMY      CORONARY ANGIOGRAPHY      no obstructive CAD    EPIDURAL STEROID INJECTION N/A 2020    Procedure: LUMBAR L2/3 NANCY DIRECT REFERRAL;  Surgeon: Ion Bearden MD;  Location: [facility];  Service: Pain Management;  Laterality: N/A;  NEEDS CONSENT    KNEE ARTHROSCOPY Right 2004    SHOULDER SURGERY  1982    right/had a bone infectioni    THYROIDECTOMY  1977    hyperparathyroid&thyroidectomy 2006    TOTAL KNEE ARTHROPLASTY  10/13/2014     Social History     Socioeconomic History    Marital status:    Tobacco Use    Smoking status: Former Smoker     Quit date: 1999     Years since quittin.5    Smokeless tobacco: Never Used   Substance and Sexual Activity    Alcohol use: No    Drug use: No    Sexual activity: Never     Family History   Problem Relation Age of Onset    Heart disease Mother     Hypertension Mother     Heart disease Father     Hypertension Father     Diabetes Brother     Hypertension Brother     Heart disease Brother     Kidney disease Brother     Skin cancer Neg Hx     Psoriasis Neg Hx         Allergies  Review of patient's allergies indicates:   Allergen Reactions    Atorvastatin Other (See Comments)    Rosuvastatin Other (See Comments)     Felt vibration/myalgia     Sulfamethoxazole-trimethoprim Swelling, Diarrhea and Other (See Comments)       Review of Systems   Review of Systems   Constitutional: Negative for malaise/fatigue, weight gain and weight loss.   Eyes: Negative for visual  disturbance.   Cardiovascular: Positive for palpitations. Negative for chest pain, claudication, cyanosis, dyspnea on exertion, irregular heartbeat, leg swelling, near-syncope, orthopnea, paroxysmal nocturnal dyspnea and syncope.   Respiratory: Positive for shortness of breath (Unchanged from prior evaluations). Negative for cough, hemoptysis, sleep disturbances due to breathing and wheezing.    Hematologic/Lymphatic: Negative for bleeding problem. Does not bruise/bleed easily.   Skin: Negative for poor wound healing.   Musculoskeletal: Negative for muscle cramps and myalgias.   Gastrointestinal: Negative for abdominal pain, anorexia, diarrhea, heartburn, hematemesis, hematochezia, melena, nausea and vomiting.   Genitourinary: Negative for hematuria and nocturia.   Neurological: Negative for excessive daytime sleepiness, dizziness, focal weakness, light-headedness and weakness.       Physical Exam  Vitals:    01/20/22 1311   BP: (!) 126/58   Pulse: 70     Wt Readings from Last 1 Encounters:   01/20/22 90.7 kg (199 lb 14.4 oz)     Physical Exam  Constitutional:       General: She is not in acute distress.     Appearance: She is obese. She is not toxic-appearing or diaphoretic.   HENT:      Head: Normocephalic and atraumatic.   Eyes:      General: No scleral icterus.     Conjunctiva/sclera: Conjunctivae normal.   Neck:      Thyroid: No thyromegaly.      Vascular: No carotid bruit, hepatojugular reflux or JVD.      Trachea: Trachea normal.   Cardiovascular:      Rate and Rhythm: Normal rate and regular rhythm.  No extrasystoles are present.     Chest Wall: PMI is not displaced.      Pulses:           Carotid pulses are 2+ on the right side and 2+ on the left side.       Radial pulses are 2+ on the right side and 2+ on the left side.        Dorsalis pedis pulses are 2+ on the right side and 2+ on the left side.        Posterior tibial pulses are 2+ on the right side and 2+ on the left side.      Heart sounds: S1 normal  and S2 normal. No murmur heard.  No S3 or S4 sounds.    Pulmonary:      Effort: No accessory muscle usage or respiratory distress.      Breath sounds: No decreased breath sounds, wheezing, rhonchi or rales.   Abdominal:      General: Bowel sounds are normal. There is no abdominal bruit.      Palpations: Abdomen is soft. There is no hepatomegaly, splenomegaly or pulsatile mass.      Tenderness: There is no abdominal tenderness.   Musculoskeletal:         General: No tenderness or deformity.      Right lower leg: No edema.      Left lower leg: No edema.   Skin:     General: Skin is warm and dry.      Coloration: Skin is not cyanotic or pale.      Nails: There is no clubbing.   Neurological:      General: No focal deficit present.      Mental Status: She is alert and oriented to person, place, and time.   Psychiatric:         Speech: Speech normal.         Behavior: Behavior normal. Behavior is cooperative.         Labs  Office Visit on 01/10/2022   Component Date Value Ref Range Status    POC Rapid COVID 01/10/2022 Negative  Negative Final     Acceptable 01/10/2022 Yes   Final   Hospital Outpatient Visit on 11/01/2021   Component Date Value Ref Range Status    Holter length hours 11/01/2021 48   Final    holter length minutes 11/01/2021 0   Final    holter length dec hours 11/01/2021 48.00   Final    Sinus min HR 11/01/2021 50   Final    Sinus max hr 11/01/2021 110   Final    Sinus avg hr 11/01/2021 65   Final    Event Monitor Day 11/01/2021 0   Final   Office Visit on 10/13/2021   Component Date Value Ref Range Status    POC Rapid COVID 10/13/2021 Negative  Negative Final     Acceptable 10/13/2021 Yes   Final   Office Visit on 10/04/2021   Component Date Value Ref Range Status    POC Rapid COVID 10/04/2021 Negative  Negative Final     Acceptable 10/04/2021 Yes   Final    POC Blood, Urine 10/04/2021 Negative  Negative Final    POC Bilirubin, Urine 10/04/2021  Negative  Negative Final    POC Urobilinogen, Urine 10/04/2021 normal  0.1 - 1.1 Final    POC Ketones, Urine 10/04/2021 Negative  Negative Final    POC Protein, Urine 10/04/2021 Negative  Negative Final    POC Nitrates, Urine 10/04/2021 Negative  Negative Final    POC Glucose, Urine 10/04/2021 Negative  Negative Final    pH, UA 10/04/2021 8.0  5 - 8 Final    POC Specific Gravity, Urine 10/04/2021 1.010  1.003 - 1.029 Final    POC Leukocytes, Urine 10/04/2021 Negative  Negative Final       Imaging  X-Ray Chest PA And Lateral    Result Date: 1/10/2022  EXAMINATION: XR CHEST PA AND LATERAL CLINICAL HISTORY: Shortness of breath TECHNIQUE: PA and lateral views of the chest were performed. COMPARISON: 10/04/2021. FINDINGS: The trachea is unremarkable.  There are calcifications of the aortic knob.  The cardiomediastinal silhouette is within normal limits.  The hemidiaphragms are unremarkable.  There are no pleural effusions.  There is no evidence of a pneumothorax.  There is no evidence of pneumomediastinum.  No airspace opacity is present.  There are degenerative changes in the osseous structures.     No acute process. Electronically signed by: Karl Calderón MD Date:    01/10/2022 Time:    16:12      Assessment  1. Palpitations  No significant arrhythmias seen on monitoring    2. Pure hypercholesterolemia  Well controlled on current regimen    3. Primary hypertension  Controlled    4. Chronic obstructive pulmonary disease, unspecified COPD type  Dr. Olson    5. Severe obesity  Unchanged    6. Malignant neoplasm of central portion of right female breast, unspecified estrogen receptor status  Noted    7. Chemotherapy-induced neuropathy  Noted      Plan and Discussion    Continue current guideline directed medical therapy.    The 10-year ASCVD risk score ([place] JOSE E Jr., et al., 2013) is: 17.3%    Values used to calculate the score:      Age: 76 years      Sex: Female      Is Non- : Yes       Diabetic: No      Tobacco smoker: No      Systolic Blood Pressure: 126 mmHg      Is BP treated: Yes      HDL Cholesterol: 75 mg/dL      Total Cholesterol: 166 mg/dL     Follow Up  Follow up in about 1 year (around 1/20/2023).      Bryan Mejía MD

## 2022-02-10 DIAGNOSIS — E78.00 HYPERCHOLESTEROLEMIA: ICD-10-CM

## 2022-02-10 RX ORDER — EZETIMIBE 10 MG/1
10 TABLET ORAL DAILY
Qty: 90 TABLET | Refills: 3 | Status: SHIPPED | OUTPATIENT
Start: 2022-02-10 | End: 2022-08-25

## 2022-02-10 NOTE — TELEPHONE ENCOUNTER
----- Message from Joanne Torres sent at 2/10/2022  9:45 AM CST -----  Regarding: Refill request  Type:  RX Refill Request    Who Called: MATILDA HILL [[id number]]    Refill or New Rx: Refill    RX Name and Strength: ezetimibe (ZETIA) 10 mg tablet      How is the patient currently taking it? (ex. 1XDay) 1xday     Is this a 30 day or 90 day RX: 90 days     Preferred Pharmacy with phone number: [facility]/PHARMACY #[id number] - [place], [place] - [address]    Local or Mail Order: local     Ordering Provider: Kym Ramirez Call Back Number:[phone]    Additional Information:

## 2022-03-03 DIAGNOSIS — E78.00 HYPERCHOLESTEROLEMIA: ICD-10-CM

## 2022-03-03 RX ORDER — PRAVASTATIN SODIUM 40 MG/1
40 TABLET ORAL DAILY
Qty: 90 TABLET | Refills: 3 | Status: SHIPPED | OUTPATIENT
Start: 2022-03-03 | End: 2023-03-03

## 2022-03-03 NOTE — TELEPHONE ENCOUNTER
----- Message from Ebony Woodardfield sent at 3/3/2022 12:53 PM CST -----  Who Called:  MATILDA HILL    Refill or New Rx: Refill    RX Name and Strength: pravastatin (PRAVACHOL) 40 MG tablet    How is the patient currently taking it? (ex. 1XDay): 1xday    Is this a 30 day or 90 day RX: 90 day    Preferred Pharmacy with phone number: [facility]/pharmacy #[id number] - [place] - [address]    Local or Mail Order: Local    Ordering Provider: Bryan Mejía MD    Best Call Back Number: [phone]    Additional Information:

## 2022-04-14 ENCOUNTER — OFFICE VISIT (OUTPATIENT)
Dept: URGENT CARE | Facility: CLINIC | Age: 77
End: 2022-04-14
Payer: MEDICARE

## 2022-04-14 VITALS
WEIGHT: 199 LBS | RESPIRATION RATE: 18 BRPM | BODY MASS INDEX: 35.25 KG/M2 | OXYGEN SATURATION: 98 % | TEMPERATURE: 98 F | SYSTOLIC BLOOD PRESSURE: 156 MMHG | HEART RATE: 71 BPM | DIASTOLIC BLOOD PRESSURE: 68 MMHG

## 2022-04-14 DIAGNOSIS — J30.9 ALLERGIC SINUSITIS: Primary | ICD-10-CM

## 2022-04-14 DIAGNOSIS — B37.0 ORAL THRUSH: ICD-10-CM

## 2022-04-14 PROCEDURE — 99213 OFFICE O/P EST LOW 20 MIN: CPT | Mod: S$GLB,,, | Performed by: NURSE PRACTITIONER

## 2022-04-14 PROCEDURE — 1159F PR MEDICATION LIST DOCUMENTED IN MEDICAL RECORD: ICD-10-PCS | Mod: CPTII,S$GLB,, | Performed by: NURSE PRACTITIONER

## 2022-04-14 PROCEDURE — 1160F PR REVIEW ALL MEDS BY PRESCRIBER/CLIN PHARMACIST DOCUMENTED: ICD-10-PCS | Mod: CPTII,S$GLB,, | Performed by: NURSE PRACTITIONER

## 2022-04-14 PROCEDURE — 1125F PR PAIN SEVERITY QUANTIFIED, PAIN PRESENT: ICD-10-PCS | Mod: CPTII,S$GLB,, | Performed by: NURSE PRACTITIONER

## 2022-04-14 PROCEDURE — 3078F DIAST BP <80 MM HG: CPT | Mod: CPTII,S$GLB,, | Performed by: NURSE PRACTITIONER

## 2022-04-14 PROCEDURE — 99213 PR OFFICE/OUTPT VISIT, EST, LEVL III, 20-29 MIN: ICD-10-PCS | Mod: S$GLB,,, | Performed by: NURSE PRACTITIONER

## 2022-04-14 PROCEDURE — 1125F AMNT PAIN NOTED PAIN PRSNT: CPT | Mod: CPTII,S$GLB,, | Performed by: NURSE PRACTITIONER

## 2022-04-14 PROCEDURE — 1159F MED LIST DOCD IN RCRD: CPT | Mod: CPTII,S$GLB,, | Performed by: NURSE PRACTITIONER

## 2022-04-14 PROCEDURE — 1160F RVW MEDS BY RX/DR IN RCRD: CPT | Mod: CPTII,S$GLB,, | Performed by: NURSE PRACTITIONER

## 2022-04-14 PROCEDURE — 3078F PR MOST RECENT DIASTOLIC BLOOD PRESSURE < 80 MM HG: ICD-10-PCS | Mod: CPTII,S$GLB,, | Performed by: NURSE PRACTITIONER

## 2022-04-14 PROCEDURE — 3077F SYST BP >= 140 MM HG: CPT | Mod: CPTII,S$GLB,, | Performed by: NURSE PRACTITIONER

## 2022-04-14 PROCEDURE — 3077F PR MOST RECENT SYSTOLIC BLOOD PRESSURE >= 140 MM HG: ICD-10-PCS | Mod: CPTII,S$GLB,, | Performed by: NURSE PRACTITIONER

## 2022-04-14 RX ORDER — NYSTATIN 100000 [USP'U]/ML
4 SUSPENSION ORAL 4 TIMES DAILY
Qty: 160 ML | Refills: 0 | Status: SHIPPED | OUTPATIENT
Start: 2022-04-14 | End: 2022-04-24

## 2022-04-14 NOTE — PROGRESS NOTES
Subjective:       Patient ID: Adrisivakumar Rice is a 76 y.o. female.    Vitals:  weight is 90.3 kg (199 lb). Her temperature is 98 °F (36.7 °C). Her blood pressure is 156/68 (abnormal) and her pulse is 71. Her respiration is 18 and oxygen saturation is 98%.     Chief Complaint: Sinus Problem    Sinus Problem  This is a new problem. The current episode started more than 1 month ago ( months now ). There has been no fever. Her pain is at a severity of 2/10. The pain is mild. Associated symptoms include ear pain, sinus pressure and sneezing. Pertinent negatives include no chills, congestion, coughing, headaches, hoarse voice, neck pain or swollen glands. Treatments tried: otc sinus.       Constitution: Negative for chills.   HENT: Positive for ear pain and sinus pressure. Negative for congestion.    Neck: Negative for neck pain.   Respiratory: Negative for cough.    Allergic/Immunologic: Positive for sneezing.   Neurological: Negative for headaches.       Objective:      Physical Exam   Constitutional: She is oriented to person, place, and time. She appears well-developed. She is cooperative.  Non-toxic appearance. She does not appear ill. No distress.      Comments:Appears well  Afebrile    HENT:   Head: Normocephalic and atraumatic.   Ears:   Right Ear: Hearing, tympanic membrane, external ear and ear canal normal.   Left Ear: Hearing, tympanic membrane, external ear and ear canal normal.   Nose: Nose normal. No mucosal edema, rhinorrhea or nasal deformity. No epistaxis. Right sinus exhibits no maxillary sinus tenderness and no frontal sinus tenderness. Left sinus exhibits no maxillary sinus tenderness and no frontal sinus tenderness.   Mouth/Throat: Uvula is midline, oropharynx is clear and moist and mucous membranes are normal. No trismus in the jaw. Normal dentition. No uvula swelling. No posterior oropharyngeal erythema.       White on tongue      Comments: White on tongue  Eyes: Conjunctivae and lids are  normal. Right eye exhibits no discharge. Left eye exhibits no discharge. No scleral icterus.   Neck: Trachea normal and phonation normal. Neck supple.   Cardiovascular: Normal rate, regular rhythm, normal heart sounds and normal pulses.   Pulmonary/Chest: Effort normal and breath sounds normal. No respiratory distress. She has no wheezes.   Lungs clear bilaterally   O2 98%    Comments: Lungs clear bilaterally   O2 98%    Abdominal: Normal appearance and bowel sounds are normal. She exhibits no distension and no mass. Soft. There is no abdominal tenderness.   Musculoskeletal: Normal range of motion.         General: No deformity. Normal range of motion.   Neurological: She is alert and oriented to person, place, and time. She exhibits normal muscle tone. Coordination normal.   Skin: Skin is warm, dry, intact, not diaphoretic and not pale.   Psychiatric: Her speech is normal and behavior is normal. Judgment and thought content normal.   Nursing note and vitals reviewed.        Assessment:       1. Allergic sinusitis    2. Oral thrush          Plan:         Allergic sinusitis    Oral thrush  -     nystatin (MYCOSTATIN) 100,000 unit/mL suspension; Take 4 mLs (400,000 Units total) by mouth 4 (four) times daily. for 10 days  Dispense: 160 mL; Refill: 0      Patient Instructions   Use an antihistamine such as Claritin, Zyrtec or Allegra to dry you out.     Use Flonase 1-2 sprays/nostril per day. It is a local acting steroid nasal spray, if you develop a bloody nose, stop using the medication immediately.    Gargle with nystatin every 6-8 hours.    Rinse mouth after using Flovent inhaler    A cool mist humidifier in bedroom may help with cough and relieve stuffy nose.     Over the counter you can use Tylenol (acetominophen) or Ibuprofen for your minor aches and pains as long as you have no contraindications.    Good nutrition. Lots of rest. Plenty of fluids     You must understand that you've received an Urgent Care  treatment only and that you may be released before all your medical problems are known or treated. You, the patient, will arrange for follow up care as instructed.  Follow up with your PCP or specialty clinic as directed in the next 1-2 weeks if not improved or as needed.  You can call [phone] to schedule an appointment with the appropriate provider.  If your condition worsens we recommend that you receive another evaluation at the emergency room immediately or contact your primary medical clinics after hours call service to discuss your concerns.  Please return here or go to the Emergency Department for any concerns or worsening of condition.

## 2022-04-14 NOTE — PATIENT INSTRUCTIONS
Use an antihistamine such as Claritin, Zyrtec or Allegra to dry you out.     Use Flonase 1-2 sprays/nostril per day. It is a local acting steroid nasal spray, if you develop a bloody nose, stop using the medication immediately.    Gargle with nystatin every 6-8 hours.    Rinse mouth after using Flovent inhaler    A cool mist humidifier in bedroom may help with cough and relieve stuffy nose.     Over the counter you can use Tylenol (acetominophen) or Ibuprofen for your minor aches and pains as long as you have no contraindications.    Good nutrition. Lots of rest. Plenty of fluids     You must understand that you've received an Urgent Care treatment only and that you may be released before all your medical problems are known or treated. You, the patient, will arrange for follow up care as instructed.  Follow up with your PCP or specialty clinic as directed in the next 1-2 weeks if not improved or as needed.  You can call [phone] to schedule an appointment with the appropriate provider.  If your condition worsens we recommend that you receive another evaluation at the emergency room immediately or contact your primary medical clinics after hours call service to discuss your concerns.  Please return here or go to the Emergency Department for any concerns or worsening of condition.

## 2022-05-04 ENCOUNTER — OFFICE VISIT (OUTPATIENT)
Dept: CARDIOLOGY | Facility: CLINIC | Age: 77
End: 2022-05-04
Payer: MEDICARE

## 2022-05-04 VITALS
HEART RATE: 71 BPM | BODY MASS INDEX: 34.7 KG/M2 | SYSTOLIC BLOOD PRESSURE: 136 MMHG | OXYGEN SATURATION: 98 % | DIASTOLIC BLOOD PRESSURE: 62 MMHG | WEIGHT: 195.88 LBS

## 2022-05-04 DIAGNOSIS — T45.1X5A CHEMOTHERAPY-INDUCED NEUROPATHY: ICD-10-CM

## 2022-05-04 DIAGNOSIS — J44.9 CHRONIC OBSTRUCTIVE PULMONARY DISEASE, UNSPECIFIED COPD TYPE: ICD-10-CM

## 2022-05-04 DIAGNOSIS — E66.01 SEVERE OBESITY: ICD-10-CM

## 2022-05-04 DIAGNOSIS — E78.00 PURE HYPERCHOLESTEROLEMIA: ICD-10-CM

## 2022-05-04 DIAGNOSIS — I10 PRIMARY HYPERTENSION: ICD-10-CM

## 2022-05-04 DIAGNOSIS — R00.2 PALPITATIONS: Primary | ICD-10-CM

## 2022-05-04 DIAGNOSIS — C50.111 MALIGNANT NEOPLASM OF CENTRAL PORTION OF RIGHT FEMALE BREAST, UNSPECIFIED ESTROGEN RECEPTOR STATUS: ICD-10-CM

## 2022-05-04 DIAGNOSIS — G62.0 CHEMOTHERAPY-INDUCED NEUROPATHY: ICD-10-CM

## 2022-05-04 DIAGNOSIS — E21.0 PRIMARY HYPERPARATHYROIDISM: ICD-10-CM

## 2022-05-04 PROCEDURE — 3288F PR FALLS RISK ASSESSMENT DOCUMENTED: ICD-10-PCS | Mod: CPTII,S$GLB,, | Performed by: INTERNAL MEDICINE

## 2022-05-04 PROCEDURE — 3078F DIAST BP <80 MM HG: CPT | Mod: CPTII,S$GLB,, | Performed by: INTERNAL MEDICINE

## 2022-05-04 PROCEDURE — 1160F PR REVIEW ALL MEDS BY PRESCRIBER/CLIN PHARMACIST DOCUMENTED: ICD-10-PCS | Mod: CPTII,S$GLB,, | Performed by: INTERNAL MEDICINE

## 2022-05-04 PROCEDURE — 99213 PR OFFICE/OUTPT VISIT, EST, LEVL III, 20-29 MIN: ICD-10-PCS | Mod: 25,S$GLB,, | Performed by: INTERNAL MEDICINE

## 2022-05-04 PROCEDURE — 1101F PR PT FALLS ASSESS DOC 0-1 FALLS W/OUT INJ PAST YR: ICD-10-PCS | Mod: CPTII,S$GLB,, | Performed by: INTERNAL MEDICINE

## 2022-05-04 PROCEDURE — 99999 PR PBB SHADOW E&M-EST. PATIENT-LVL III: ICD-10-PCS | Mod: PBBFAC,,, | Performed by: INTERNAL MEDICINE

## 2022-05-04 PROCEDURE — 93010 ELECTROCARDIOGRAM REPORT: CPT | Mod: S$GLB,,, | Performed by: INTERNAL MEDICINE

## 2022-05-04 PROCEDURE — 93010 EKG 12-LEAD: ICD-10-PCS | Mod: S$GLB,,, | Performed by: INTERNAL MEDICINE

## 2022-05-04 PROCEDURE — 3288F FALL RISK ASSESSMENT DOCD: CPT | Mod: CPTII,S$GLB,, | Performed by: INTERNAL MEDICINE

## 2022-05-04 PROCEDURE — 99999 PR PBB SHADOW E&M-EST. PATIENT-LVL III: CPT | Mod: PBBFAC,,, | Performed by: INTERNAL MEDICINE

## 2022-05-04 PROCEDURE — 1126F AMNT PAIN NOTED NONE PRSNT: CPT | Mod: CPTII,S$GLB,, | Performed by: INTERNAL MEDICINE

## 2022-05-04 PROCEDURE — 1159F MED LIST DOCD IN RCRD: CPT | Mod: CPTII,S$GLB,, | Performed by: INTERNAL MEDICINE

## 2022-05-04 PROCEDURE — 93005 ELECTROCARDIOGRAM TRACING: CPT

## 2022-05-04 PROCEDURE — 1160F RVW MEDS BY RX/DR IN RCRD: CPT | Mod: CPTII,S$GLB,, | Performed by: INTERNAL MEDICINE

## 2022-05-04 PROCEDURE — 3078F PR MOST RECENT DIASTOLIC BLOOD PRESSURE < 80 MM HG: ICD-10-PCS | Mod: CPTII,S$GLB,, | Performed by: INTERNAL MEDICINE

## 2022-05-04 PROCEDURE — 3075F PR MOST RECENT SYSTOLIC BLOOD PRESS GE 130-139MM HG: ICD-10-PCS | Mod: CPTII,S$GLB,, | Performed by: INTERNAL MEDICINE

## 2022-05-04 PROCEDURE — 1126F PR PAIN SEVERITY QUANTIFIED, NO PAIN PRESENT: ICD-10-PCS | Mod: CPTII,S$GLB,, | Performed by: INTERNAL MEDICINE

## 2022-05-04 PROCEDURE — 99213 OFFICE O/P EST LOW 20 MIN: CPT | Mod: 25,S$GLB,, | Performed by: INTERNAL MEDICINE

## 2022-05-04 PROCEDURE — 1101F PT FALLS ASSESS-DOCD LE1/YR: CPT | Mod: CPTII,S$GLB,, | Performed by: INTERNAL MEDICINE

## 2022-05-04 PROCEDURE — 1159F PR MEDICATION LIST DOCUMENTED IN MEDICAL RECORD: ICD-10-PCS | Mod: CPTII,S$GLB,, | Performed by: INTERNAL MEDICINE

## 2022-05-04 PROCEDURE — 3075F SYST BP GE 130 - 139MM HG: CPT | Mod: CPTII,S$GLB,, | Performed by: INTERNAL MEDICINE

## 2022-05-04 RX ORDER — CINACALCET 30 MG/1
TABLET, FILM COATED ORAL
COMMUNITY
Start: 2022-04-06 | End: 2022-08-25

## 2022-05-04 NOTE — PROGRESS NOTES
[facility]    Chief Complaint  Chief Complaint   Patient presents with    Palpitations       HPI:    Presents because of concerns about palpitations returning.  Will present when she woke up this morning.  Feels her heart is skipping a beat.  Persists even until her evaluation in this exam room.  Rhythm is regular by exam.  Also complains of anxiety, exertional dyspnea, flank pain, and shoulder pain.    Medications  Current Outpatient Medications   Medication Sig Dispense Refill    cinacalcet (SENSIPAR) 30 MG Tab One tab every Monday Wednesday friday      albuterol (ACCUNEB) 0.63 mg/3 mL Nebu Take 0.63 mg by nebulization every 6 (six) hours as needed.      amLODIPine (NORVASC) 5 MG tablet Take 5 mg by mouth once daily.       ascorbic acid, vitamin C, (VITAMIN C) 500 MG tablet Take 500 mg by mouth.      baclofen (LIORESAL) 10 MG tablet Take 1 tablet (10 mg total) by mouth every evening. (Patient not taking: No sig reported) 30 tablet 11    calcipotriene 0.005 % ointment   1    calcium carbonate-vitamin D3 250-125 mg 250-125 mg-unit Tab Take 1 tablet by mouth once daily. 30 tablet 11    cetirizine (ZYRTEC) 10 MG tablet Take 10 mg by mouth once daily.      ergocalciferol (ERGOCALCIFEROL) 50,000 unit Cap Take 50,000 Units by mouth every 30 days.      ezetimibe (ZETIA) 10 mg tablet Take 1 tablet (10 mg total) by mouth once daily. 90 tablet 3    fluticasone (FLOVENT HFA) 110 mcg/actuation inhaler Inhale 1 puff into the lungs 2 (two) times daily.      gabapentin (NEURONTIN) 100 MG capsule       irbesartan (AVAPRO) 300 MG tablet Take 300 mg by mouth once daily.       meloxicam (MOBIC) 15 MG tablet Take 15 mg by mouth once daily.      omeprazole (PRILOSEC) 20 MG capsule       pravastatin (PRAVACHOL) 40 MG tablet Take 1 tablet (40 mg total) by mouth once daily. 90 tablet 3    VECTICAL 3 mcg/gram ointment AAA qd - bid for maintanence to under breast and stomach (Patient not taking: Reported on  2020) 100 g 3    vitamin D (VITAMIN D3) 1000 units Tab Take 1,000 Units by mouth.       No current facility-administered medications for this visit.        History  Past Medical History:   Diagnosis Date    Asthma     Breast cancer     Fibromyalgia     Hypertension     Psoriasis vulgaris      Past Surgical History:   Procedure Laterality Date    APPENDECTOMY      BREAST LUMPECTOMY  7-    breast cancer    BUNIONECTOMY      CORONARY ANGIOGRAPHY      no obstructive CAD    EPIDURAL STEROID INJECTION N/A 2020    Procedure: LUMBAR L2/3 NANCY DIRECT REFERRAL;  Surgeon: Ion Bearden MD;  Location: [facility], [place], operated by [facility];  Service: Pain Management;  Laterality: N/A;  NEEDS CONSENT    KNEE ARTHROSCOPY Right 2004    SHOULDER SURGERY  1982    right/had a bone infectioni    THYROIDECTOMY      hyperparathyroid&thyroidectomy     TOTAL KNEE ARTHROPLASTY  10/13/2014     Social History     Socioeconomic History    Marital status:    Tobacco Use    Smoking status: Former Smoker     Quit date: 1999     Years since quittin.7    Smokeless tobacco: Never Used   Substance and Sexual Activity    Alcohol use: No    Drug use: No    Sexual activity: Never     Family History   Problem Relation Age of Onset    Heart disease Mother     Hypertension Mother     Heart disease Father     Hypertension Father     Diabetes Brother     Hypertension Brother     Heart disease Brother     Kidney disease Brother     Skin cancer Neg Hx     Psoriasis Neg Hx         Allergies  Review of patient's allergies indicates:   Allergen Reactions    Atorvastatin Other (See Comments)    Rosuvastatin Other (See Comments)     Felt vibration/myalgia     Sulfamethoxazole-trimethoprim Swelling, Diarrhea and Other (See Comments)       Review of Systems   Review of Systems   Constitutional: Negative for malaise/fatigue, weight gain and weight loss.   Eyes: Negative for visual disturbance.    Cardiovascular: Positive for palpitations. Negative for chest pain, claudication, cyanosis, dyspnea on exertion, irregular heartbeat, leg swelling, near-syncope, orthopnea, paroxysmal nocturnal dyspnea and syncope.   Respiratory: Positive for shortness of breath (Unchanged from prior evaluations). Negative for cough, hemoptysis, sleep disturbances due to breathing and wheezing.    Hematologic/Lymphatic: Negative for bleeding problem. Does not bruise/bleed easily.   Skin: Negative for poor wound healing.   Musculoskeletal: Positive for back pain and joint pain. Negative for muscle cramps and myalgias.   Gastrointestinal: Negative for abdominal pain, anorexia, diarrhea, heartburn, hematemesis, hematochezia, melena, nausea and vomiting.   Genitourinary: Negative for hematuria and nocturia.   Neurological: Negative for excessive daytime sleepiness, dizziness, focal weakness, light-headedness and weakness.   Psychiatric/Behavioral: The patient is nervous/anxious.        Physical Exam  Vitals:    05/04/22 1446   BP: 136/62   Pulse: 71     Wt Readings from Last 1 Encounters:   05/04/22 88.9 kg (195 lb 14.4 oz)     Physical Exam  Constitutional:       General: She is not in acute distress.     Appearance: She is obese. She is not toxic-appearing or diaphoretic.   HENT:      Head: Normocephalic and atraumatic.   Eyes:      General: No scleral icterus.     Conjunctiva/sclera: Conjunctivae normal.   Neck:      Thyroid: No thyromegaly.      Vascular: No carotid bruit, hepatojugular reflux or JVD.      Trachea: Trachea normal.   Cardiovascular:      Rate and Rhythm: Normal rate and regular rhythm.  No extrasystoles are present.     Chest Wall: PMI is not displaced.      Pulses:           Carotid pulses are 2+ on the right side and 2+ on the left side.       Radial pulses are 2+ on the right side and 2+ on the left side.        Dorsalis pedis pulses are 2+ on the right side and 2+ on the left side.        Posterior tibial  pulses are 2+ on the right side and 2+ on the left side.      Heart sounds: S1 normal and S2 normal. No murmur heard.    No S3 or S4 sounds.   Pulmonary:      Effort: No accessory muscle usage or respiratory distress.      Breath sounds: No decreased breath sounds, wheezing, rhonchi or rales.   Abdominal:      General: Bowel sounds are normal. There is no abdominal bruit.      Palpations: Abdomen is soft. There is no hepatomegaly, splenomegaly or pulsatile mass.      Tenderness: There is no abdominal tenderness.   Musculoskeletal:         General: No tenderness or deformity.      Right lower leg: No edema.      Left lower leg: No edema.   Skin:     General: Skin is warm and dry.      Coloration: Skin is not cyanotic or pale.      Nails: There is no clubbing.   Neurological:      General: No focal deficit present.      Mental Status: She is alert and oriented to person, place, and time.   Psychiatric:         Speech: Speech normal.         Behavior: Behavior normal. Behavior is cooperative.         Labs  Office Visit on 01/10/2022   Component Date Value Ref Range Status    POC Rapid COVID 01/10/2022 Negative  Negative Final     Acceptable 01/10/2022 Yes   Final       Imaging  No results found.    Assessment  1. Palpitations  Benign ectopy.  Workup has shown no significant arrhythmias.    2. Pure hypercholesterolemia  Controlled    3. Primary hypertension  Controlled    4. Chronic obstructive pulmonary disease, unspecified COPD type  Dr. Melgoza    5. Severe obesity  Needs to lose weight    6. Malignant neoplasm of central portion of right female breast, unspecified estrogen receptor status  Followed at [facility]    7. Chemotherapy-induced neuropathy  Seems to be not very limiting    8. Primary hyperparathyroidism  Dr. Lino      Plan and Discussion    Reassured patient that she does not have any significant arrhythmias today.  And, prior workup has been unrevealing.  Discussed benign ectopy.  She should  follow-up with Dr. Abad pratt regarding her other concerns.    The 10-year ASCVD risk score (Farwellvivi DORANTES Jr., et al., 2013) is: 18.9%    Values used to calculate the score:      Age: 76 years      Sex: Female      Is Non- : Yes      Diabetic: No      Tobacco smoker: No      Systolic Blood Pressure: 136 mmHg      Is BP treated: Yes      HDL Cholesterol: 75 mg/dL      Total Cholesterol: 166 mg/dL     Follow Up  As scheduled      Bryan Mejía MD

## 2022-06-04 ENCOUNTER — OFFICE VISIT (OUTPATIENT)
Dept: URGENT CARE | Facility: CLINIC | Age: 77
End: 2022-06-04
Payer: MEDICARE

## 2022-06-04 VITALS
RESPIRATION RATE: 16 BRPM | BODY MASS INDEX: 34.55 KG/M2 | OXYGEN SATURATION: 99 % | TEMPERATURE: 97 F | HEIGHT: 63 IN | HEART RATE: 69 BPM | SYSTOLIC BLOOD PRESSURE: 145 MMHG | WEIGHT: 195 LBS | DIASTOLIC BLOOD PRESSURE: 60 MMHG

## 2022-06-04 DIAGNOSIS — J30.9 ALLERGIC SINUSITIS: Primary | ICD-10-CM

## 2022-06-04 DIAGNOSIS — Z11.9 ENCOUNTER FOR SCREENING EXAMINATION FOR INFECTIOUS DISEASE: ICD-10-CM

## 2022-06-04 LAB
CTP QC/QA: YES
SARS-COV-2 RDRP RESP QL NAA+PROBE: NEGATIVE

## 2022-06-04 PROCEDURE — 3077F SYST BP >= 140 MM HG: CPT | Mod: CPTII,S$GLB,, | Performed by: NURSE PRACTITIONER

## 2022-06-04 PROCEDURE — 3078F DIAST BP <80 MM HG: CPT | Mod: CPTII,S$GLB,, | Performed by: NURSE PRACTITIONER

## 2022-06-04 PROCEDURE — 99213 PR OFFICE/OUTPT VISIT, EST, LEVL III, 20-29 MIN: ICD-10-PCS | Mod: S$GLB,,, | Performed by: NURSE PRACTITIONER

## 2022-06-04 PROCEDURE — 1159F MED LIST DOCD IN RCRD: CPT | Mod: CPTII,S$GLB,, | Performed by: NURSE PRACTITIONER

## 2022-06-04 PROCEDURE — U0002 COVID-19 LAB TEST NON-CDC: HCPCS | Mod: QW,S$GLB,, | Performed by: NURSE PRACTITIONER

## 2022-06-04 PROCEDURE — 99213 OFFICE O/P EST LOW 20 MIN: CPT | Mod: S$GLB,,, | Performed by: NURSE PRACTITIONER

## 2022-06-04 PROCEDURE — 3078F PR MOST RECENT DIASTOLIC BLOOD PRESSURE < 80 MM HG: ICD-10-PCS | Mod: CPTII,S$GLB,, | Performed by: NURSE PRACTITIONER

## 2022-06-04 PROCEDURE — 1159F PR MEDICATION LIST DOCUMENTED IN MEDICAL RECORD: ICD-10-PCS | Mod: CPTII,S$GLB,, | Performed by: NURSE PRACTITIONER

## 2022-06-04 PROCEDURE — 3077F PR MOST RECENT SYSTOLIC BLOOD PRESSURE >= 140 MM HG: ICD-10-PCS | Mod: CPTII,S$GLB,, | Performed by: NURSE PRACTITIONER

## 2022-06-04 PROCEDURE — 1160F RVW MEDS BY RX/DR IN RCRD: CPT | Mod: CPTII,S$GLB,, | Performed by: NURSE PRACTITIONER

## 2022-06-04 PROCEDURE — 1160F PR REVIEW ALL MEDS BY PRESCRIBER/CLIN PHARMACIST DOCUMENTED: ICD-10-PCS | Mod: CPTII,S$GLB,, | Performed by: NURSE PRACTITIONER

## 2022-06-04 PROCEDURE — U0002: ICD-10-PCS | Mod: QW,S$GLB,, | Performed by: NURSE PRACTITIONER

## 2022-06-04 NOTE — PROGRESS NOTES
"Subjective:       Patient ID: Adri Rice is a 76 y.o. female.    Vitals:  height is 5' 3" (1.6 m) and weight is 88.5 kg (195 lb). Her temperature is 96.5 °F (35.8 °C). Her blood pressure is 145/60 (abnormal) and her pulse is 69. Her respiration is 16 and oxygen saturation is 99%.     Chief Complaint: Sinus Problem    Pt has been having sore throat when waking up and sneezing for the last few days and has recently began feeling nauseated along with vomiting with diarrhea for the last few days. She has been taking a new medication but her sinuses had been bothering her for a while. Pt has been taking levocetirizine, ipartropium, and azelastine spray and is unsure if she may be having side effects from medication.     Sinus Problem  This is a new problem. The current episode started 1 to 4 weeks ago. There has been no fever. Her pain is at a severity of 0/10. She is experiencing no pain. Associated symptoms include congestion, sneezing and a sore throat. Pertinent negatives include no chills, diaphoresis or sinus pressure. Past treatments include spray decongestants and oral decongestants. The treatment provided no relief.       Constitution: Negative for chills, sweating, fatigue and fever.   HENT: Positive for congestion, postnasal drip and sore throat. Negative for sinus pain and sinus pressure.    Allergic/Immunologic: Positive for seasonal allergies and sneezing.       Objective:      Physical Exam   Constitutional: She is oriented to person, place, and time. She appears well-developed. She is cooperative.  Non-toxic appearance. She does not appear ill. No distress.      Comments:ambulatory     HENT:   Nose: Mucosal edema (significant swelling approx. 80% occlusion due to swelling), rhinorrhea and congestion present. No purulent discharge. Right sinus exhibits no maxillary sinus tenderness and no frontal sinus tenderness. Left sinus exhibits no maxillary sinus tenderness and no frontal sinus tenderness. "   Mouth/Throat: Uvula is midline and mucous membranes are normal. Mucous membranes are moist. No uvula swelling. Posterior oropharyngeal erythema (mild) present. No oropharyngeal exudate or posterior oropharyngeal edema. No tonsillar exudate.   Eyes: Conjunctivae are normal. Extraocular movement intact   Cardiovascular: Normal rate and regular rhythm.   Pulmonary/Chest: Effort normal and breath sounds normal. No respiratory distress. She has no wheezes. She has no rhonchi. She has no rales.   Neurological: She is alert and oriented to person, place, and time.   Skin: Skin is warm, dry and not diaphoretic.   Psychiatric: Her behavior is normal.   Nursing note and vitals reviewed.        Results for orders placed or performed in visit on 06/04/22   POCT COVID-19 Rapid Screening   Result Value Ref Range    POC Rapid COVID Negative Negative     Acceptable Yes        Assessment:       1. Allergic sinusitis    2. Encounter for screening examination for infectious disease          Plan:         Allergic sinusitis    Encounter for screening examination for infectious disease  -     POCT COVID-19 Rapid Screening       reviewed medications and correct use of nasal sprays demonstrated-- patient states that she has been spraying the nasal sprays to the inside of her nose.  Explained that is incorrect and may cause bleeding and medication will not work effectively.  Instructed patient to restart flonase and use the azelastine.  If she does not have significant rhinorrhea, she may hold off on ipratropium for now.

## 2022-06-04 NOTE — PATIENT INSTRUCTIONS
"Return to Urgent Care or go to ER if symptoms worsen or fail to improve.  Follow up with PCP as recommended for further management.   Continue Azelastine and flonase nasal spray.  See attached instructions on correct application.  May use ipratropium nasal spray as needed for runny nose.    THE FOLLOWING ARE RECOMMENDED TO HELP YOU MANAGE YOUR SYMPTOMS:    Use Nasal Saline either commercially available, such as Alexander Med nasal wash cannister using "large volume/low pressure" tip or Ocean Spray; or prepare your own solution according to label instructions using the Neti Pot, or nasal wash squeeze bottle to relieve sinus congestion and pain. It is recommended that you use the nasal saline prior to using any topical nasal sprays to help clear the mucus so the medication is able to get to the mucus membranes.      Use Flonase or similar Over the Counter steroid nasal spray -- 1-2 sprays/nostril per day-- you may initially use it 2xs/day x 5 days to help with symptoms; then resume 1-2 sprays/nostril per day. Spray towards the outer side of the nose; do not spray straight back and do not spray to the center of the nose, as it will not work as effectively and may cause your nose to bleed.     Take Acetaminophen according to label instructions for fever, throat pain, headache, and body aches    Use warm salt water gargles to ease your throat pain. Warm salt water gargles as needed for sore throat-  1/2 tsp salt to 1 cup warm water, gargle as desired.            "

## 2022-08-15 ENCOUNTER — TELEPHONE (OUTPATIENT)
Dept: RHEUMATOLOGY | Facility: CLINIC | Age: 77
End: 2022-08-15
Payer: MEDICARE

## 2022-08-15 NOTE — TELEPHONE ENCOUNTER
Called the patient and let her know that Dr Goins is not seeing new patient or transfer patient's

## 2022-08-15 NOTE — TELEPHONE ENCOUNTER
----- Message from Roselyn Ascencio sent at 8/15/2022  4:38 PM CDT -----  Regarding: appt  Contact: pt  Pt calling to be scheduled has fibroid myalgia. Please call       Confirmed patient's contact info below:  Contact Name: Adri Rice  Phone Number: [phone]

## 2022-08-24 ENCOUNTER — OFFICE VISIT (OUTPATIENT)
Dept: URGENT CARE | Facility: CLINIC | Age: 77
End: 2022-08-24
Payer: MEDICARE

## 2022-08-24 VITALS
RESPIRATION RATE: 20 BRPM | OXYGEN SATURATION: 100 % | HEIGHT: 63 IN | SYSTOLIC BLOOD PRESSURE: 150 MMHG | HEART RATE: 64 BPM | WEIGHT: 195 LBS | DIASTOLIC BLOOD PRESSURE: 65 MMHG | TEMPERATURE: 99 F | BODY MASS INDEX: 34.55 KG/M2

## 2022-08-24 DIAGNOSIS — I10 PRIMARY HYPERTENSION: ICD-10-CM

## 2022-08-24 DIAGNOSIS — M79.602 ARM PAIN, LEFT: ICD-10-CM

## 2022-08-24 DIAGNOSIS — R42 DIZZINESS: Primary | ICD-10-CM

## 2022-08-24 LAB
BILIRUB UR QL STRIP: NEGATIVE
GLUCOSE SERPL-MCNC: 123 MG/DL (ref 70–110)
GLUCOSE UR QL STRIP: NEGATIVE
HGB, POC: 14.4 G/DL (ref 12–15)
KETONES UR QL STRIP: NEGATIVE
LEUKOCYTE ESTERASE UR QL STRIP: NEGATIVE
PH, POC UA: 8 (ref 5–8)
POC BLOOD, URINE: NEGATIVE
POC NITRATES, URINE: NEGATIVE
PROT UR QL STRIP: NEGATIVE
SP GR UR STRIP: 1.01 (ref 1–1.03)
UROBILINOGEN UR STRIP-ACNC: NORMAL (ref 0.1–1.1)

## 2022-08-24 PROCEDURE — 82962 POCT GLUCOSE, HAND-HELD DEVICE: ICD-10-PCS | Mod: S$GLB,,, | Performed by: FAMILY MEDICINE

## 2022-08-24 PROCEDURE — 81003 URINALYSIS AUTO W/O SCOPE: CPT | Mod: QW,S$GLB,, | Performed by: FAMILY MEDICINE

## 2022-08-24 PROCEDURE — 1159F PR MEDICATION LIST DOCUMENTED IN MEDICAL RECORD: ICD-10-PCS | Mod: CPTII,S$GLB,, | Performed by: FAMILY MEDICINE

## 2022-08-24 PROCEDURE — 3077F PR MOST RECENT SYSTOLIC BLOOD PRESSURE >= 140 MM HG: ICD-10-PCS | Mod: CPTII,S$GLB,, | Performed by: FAMILY MEDICINE

## 2022-08-24 PROCEDURE — 1126F PR PAIN SEVERITY QUANTIFIED, NO PAIN PRESENT: ICD-10-PCS | Mod: CPTII,S$GLB,, | Performed by: FAMILY MEDICINE

## 2022-08-24 PROCEDURE — 1160F RVW MEDS BY RX/DR IN RCRD: CPT | Mod: CPTII,S$GLB,, | Performed by: FAMILY MEDICINE

## 2022-08-24 PROCEDURE — 1126F AMNT PAIN NOTED NONE PRSNT: CPT | Mod: CPTII,S$GLB,, | Performed by: FAMILY MEDICINE

## 2022-08-24 PROCEDURE — 93010 ELECTROCARDIOGRAM REPORT: CPT | Mod: S$GLB,,, | Performed by: INTERNAL MEDICINE

## 2022-08-24 PROCEDURE — 99214 OFFICE O/P EST MOD 30 MIN: CPT | Mod: S$GLB,,, | Performed by: FAMILY MEDICINE

## 2022-08-24 PROCEDURE — 93005 ELECTROCARDIOGRAM TRACING: CPT | Mod: S$GLB,,, | Performed by: FAMILY MEDICINE

## 2022-08-24 PROCEDURE — 93010 EKG 12-LEAD: ICD-10-PCS | Mod: S$GLB,,, | Performed by: INTERNAL MEDICINE

## 2022-08-24 PROCEDURE — 93005 EKG 12-LEAD: ICD-10-PCS | Mod: S$GLB,,, | Performed by: FAMILY MEDICINE

## 2022-08-24 PROCEDURE — 85018 HEMOGLOBIN: CPT | Mod: QW,S$GLB,, | Performed by: FAMILY MEDICINE

## 2022-08-24 PROCEDURE — 1160F PR REVIEW ALL MEDS BY PRESCRIBER/CLIN PHARMACIST DOCUMENTED: ICD-10-PCS | Mod: CPTII,S$GLB,, | Performed by: FAMILY MEDICINE

## 2022-08-24 PROCEDURE — 85018 POCT HEMOGLOBIN: ICD-10-PCS | Mod: QW,S$GLB,, | Performed by: FAMILY MEDICINE

## 2022-08-24 PROCEDURE — 81003 POCT URINALYSIS, DIPSTICK, AUTOMATED, W/O SCOPE: ICD-10-PCS | Mod: QW,S$GLB,, | Performed by: FAMILY MEDICINE

## 2022-08-24 PROCEDURE — 3078F DIAST BP <80 MM HG: CPT | Mod: CPTII,S$GLB,, | Performed by: FAMILY MEDICINE

## 2022-08-24 PROCEDURE — 1159F MED LIST DOCD IN RCRD: CPT | Mod: CPTII,S$GLB,, | Performed by: FAMILY MEDICINE

## 2022-08-24 PROCEDURE — 3077F SYST BP >= 140 MM HG: CPT | Mod: CPTII,S$GLB,, | Performed by: FAMILY MEDICINE

## 2022-08-24 PROCEDURE — 99214 PR OFFICE/OUTPT VISIT, EST, LEVL IV, 30-39 MIN: ICD-10-PCS | Mod: S$GLB,,, | Performed by: FAMILY MEDICINE

## 2022-08-24 PROCEDURE — 3078F PR MOST RECENT DIASTOLIC BLOOD PRESSURE < 80 MM HG: ICD-10-PCS | Mod: CPTII,S$GLB,, | Performed by: FAMILY MEDICINE

## 2022-08-24 PROCEDURE — 82962 GLUCOSE BLOOD TEST: CPT | Mod: S$GLB,,, | Performed by: FAMILY MEDICINE

## 2022-08-24 RX ORDER — TRAMADOL HYDROCHLORIDE 50 MG/1
50 TABLET ORAL
COMMUNITY
Start: 2022-07-28

## 2022-08-24 RX ORDER — LEVOTHYROXINE SODIUM 75 UG/1
75 TABLET ORAL DAILY
COMMUNITY
Start: 2022-08-08

## 2022-08-24 RX ORDER — LEVOCETIRIZINE DIHYDROCHLORIDE 5 MG/1
TABLET, FILM COATED ORAL
COMMUNITY
Start: 2022-07-28

## 2022-08-24 RX ORDER — ALENDRONATE SODIUM 70 MG/1
70 TABLET ORAL
COMMUNITY
Start: 2022-06-14 | End: 2023-06-14

## 2022-08-24 NOTE — PROGRESS NOTES
"Subjective:       Patient ID: Adribhavna Rice is a 76 y.o. female.    Vitals:  height is 5' 3" (1.6 m) and weight is 88.5 kg (195 lb). Her temperature is 98.6 °F (37 °C). Her blood pressure is 150/65 (abnormal) and her pulse is 64. Her respiration is 20 and oxygen saturation is 100%.     Chief Complaint: Dizziness    Pt presents with dizziness,  pian in left arm, and tingling in toes and feet. She reports that she felt lightheaded yesterday, and called a friend to bring her to urgent care yesterday. She reports that upon her friend's arrival she decided not to come in, but began to feel dizzy again this morning which lasted few minutes. Pt denies dizziness or left arm pain at present. Denies CP, SOB, palpitations, weakness, N/V, abd pain, dysuria.     Dizziness:   Chronicity:  New  Onset:  Yesterday  Progression since onset:  Unchanged and waxing and waning  Frequency:  Constantly  Severity:  Mild  Duration:  1 minute  Initial Spell Date and Length:  2 minutes  Frequency of Spells:  Daily  Duration of Spells:  2 minutes  Treatments tried:  Nothing      Neurological: Positive for dizziness.       Objective:      Physical Exam   Constitutional: She is oriented to person, place, and time. She appears well-developed. She is cooperative.  Non-toxic appearance. She does not appear ill. No distress.   HENT:   Head: Normocephalic and atraumatic.   Ears:   Right Ear: Hearing, tympanic membrane, external ear and ear canal normal. impacted cerumen  Left Ear: Hearing, tympanic membrane, external ear and ear canal normal. impacted cerumen  Nose: Nose normal. No mucosal edema, rhinorrhea, nasal deformity or congestion. No epistaxis. Right sinus exhibits no maxillary sinus tenderness and no frontal sinus tenderness. Left sinus exhibits no maxillary sinus tenderness and no frontal sinus tenderness.   Mouth/Throat: Uvula is midline, oropharynx is clear and moist and mucous membranes are normal. No trismus in the jaw. Normal " dentition. No uvula swelling.   Eyes: Conjunctivae and lids are normal. Right eye exhibits no discharge. Left eye exhibits no discharge. No scleral icterus.   Neck: Trachea normal and phonation normal. Neck supple.   Cardiovascular: Normal rate, regular rhythm, normal heart sounds and normal pulses.   No murmur heard.  Pulmonary/Chest: Effort normal and breath sounds normal. No stridor. No respiratory distress. She has no wheezes. She has no rhonchi. She has no rales.   Abdominal: Normal appearance and bowel sounds are normal. She exhibits no distension and no mass. Soft. There is no abdominal tenderness.   Musculoskeletal: Normal range of motion.         General: No deformity. Normal range of motion.      Cervical back: She exhibits no tenderness.   Lymphadenopathy:     She has no cervical adenopathy.   Neurological: no focal deficit. She is alert, oriented to person, place, and time and at baseline. She displays no weakness. No sensory deficit. She exhibits normal muscle tone. Coordination normal.   Skin: Skin is warm, dry, intact, not diaphoretic and not pale.   Psychiatric: Her speech is normal and behavior is normal. Mood, judgment and thought content normal.   Nursing note and vitals reviewed.        Assessment:       1. Dizziness    2. Arm pain, left    3. Primary hypertension          Plan:     ECG shows mild bradycardia at 54, when compared to previous has no significant changes. Glucose,  Hgb and UA are unremarkable. Although pt is asymptomatic at present, considering left arm pain and cardiac risk factors, I have given an urgent referral to cardiology for further eval.  Discussed results/diagnosis/plan with patient in clinic. Advised to f/u with PCP. ER precautions given if symptoms get any worse. All questions answered. Patient verbally understood and agreed with treatment plan.     Dizziness  -     POCT Glucose, Hand-Held Device  -     POCT HEMOGLOBIN  -     IN OFFICE EKG 12-LEAD (to Muse)  -     POCT  Urinalysis, Dipstick, Automated, W/O Scope  -     Ambulatory referral/consult to Cardiology    Arm pain, left  -     Ambulatory referral/consult to Cardiology    Primary hypertension

## 2022-08-25 ENCOUNTER — OFFICE VISIT (OUTPATIENT)
Dept: CARDIOLOGY | Facility: CLINIC | Age: 77
End: 2022-08-25
Payer: MEDICARE

## 2022-08-25 VITALS
OXYGEN SATURATION: 98 % | SYSTOLIC BLOOD PRESSURE: 130 MMHG | WEIGHT: 190 LBS | DIASTOLIC BLOOD PRESSURE: 56 MMHG | HEART RATE: 66 BPM | BODY MASS INDEX: 33.66 KG/M2

## 2022-08-25 DIAGNOSIS — E66.01 SEVERE OBESITY: ICD-10-CM

## 2022-08-25 DIAGNOSIS — R42 DIZZINESS: Primary | ICD-10-CM

## 2022-08-25 PROCEDURE — 1159F MED LIST DOCD IN RCRD: CPT | Mod: CPTII,S$GLB,, | Performed by: INTERNAL MEDICINE

## 2022-08-25 PROCEDURE — 3288F FALL RISK ASSESSMENT DOCD: CPT | Mod: CPTII,S$GLB,, | Performed by: INTERNAL MEDICINE

## 2022-08-25 PROCEDURE — 3078F PR MOST RECENT DIASTOLIC BLOOD PRESSURE < 80 MM HG: ICD-10-PCS | Mod: CPTII,S$GLB,, | Performed by: INTERNAL MEDICINE

## 2022-08-25 PROCEDURE — 99999 PR PBB SHADOW E&M-EST. PATIENT-LVL IV: CPT | Mod: PBBFAC,,, | Performed by: INTERNAL MEDICINE

## 2022-08-25 PROCEDURE — 99215 OFFICE O/P EST HI 40 MIN: CPT | Mod: S$GLB,,, | Performed by: INTERNAL MEDICINE

## 2022-08-25 PROCEDURE — 1101F PT FALLS ASSESS-DOCD LE1/YR: CPT | Mod: CPTII,S$GLB,, | Performed by: INTERNAL MEDICINE

## 2022-08-25 PROCEDURE — 3075F PR MOST RECENT SYSTOLIC BLOOD PRESS GE 130-139MM HG: ICD-10-PCS | Mod: CPTII,S$GLB,, | Performed by: INTERNAL MEDICINE

## 2022-08-25 PROCEDURE — 99215 PR OFFICE/OUTPT VISIT, EST, LEVL V, 40-54 MIN: ICD-10-PCS | Mod: S$GLB,,, | Performed by: INTERNAL MEDICINE

## 2022-08-25 PROCEDURE — 3075F SYST BP GE 130 - 139MM HG: CPT | Mod: CPTII,S$GLB,, | Performed by: INTERNAL MEDICINE

## 2022-08-25 PROCEDURE — 1126F AMNT PAIN NOTED NONE PRSNT: CPT | Mod: CPTII,S$GLB,, | Performed by: INTERNAL MEDICINE

## 2022-08-25 PROCEDURE — 99999 PR PBB SHADOW E&M-EST. PATIENT-LVL IV: ICD-10-PCS | Mod: PBBFAC,,, | Performed by: INTERNAL MEDICINE

## 2022-08-25 PROCEDURE — 1159F PR MEDICATION LIST DOCUMENTED IN MEDICAL RECORD: ICD-10-PCS | Mod: CPTII,S$GLB,, | Performed by: INTERNAL MEDICINE

## 2022-08-25 PROCEDURE — 3288F PR FALLS RISK ASSESSMENT DOCUMENTED: ICD-10-PCS | Mod: CPTII,S$GLB,, | Performed by: INTERNAL MEDICINE

## 2022-08-25 PROCEDURE — 1101F PR PT FALLS ASSESS DOC 0-1 FALLS W/OUT INJ PAST YR: ICD-10-PCS | Mod: CPTII,S$GLB,, | Performed by: INTERNAL MEDICINE

## 2022-08-25 PROCEDURE — 3078F DIAST BP <80 MM HG: CPT | Mod: CPTII,S$GLB,, | Performed by: INTERNAL MEDICINE

## 2022-08-25 PROCEDURE — 1126F PR PAIN SEVERITY QUANTIFIED, NO PAIN PRESENT: ICD-10-PCS | Mod: CPTII,S$GLB,, | Performed by: INTERNAL MEDICINE

## 2022-08-25 RX ORDER — FLUTICASONE PROPIONATE 220 UG/1
2 AEROSOL, METERED RESPIRATORY (INHALATION) 2 TIMES DAILY
COMMUNITY
Start: 2022-04-11

## 2022-08-25 RX ORDER — CALCIPOTRIENE 50 UG/G
CREAM TOPICAL 2 TIMES DAILY PRN
COMMUNITY
Start: 2022-08-01

## 2022-08-25 RX ORDER — FLUTICASONE PROPIONATE 50 MCG
1 SPRAY, SUSPENSION (ML) NASAL
COMMUNITY
Start: 2022-08-11

## 2023-01-23 ENCOUNTER — OFFICE VISIT (OUTPATIENT)
Dept: CARDIOLOGY | Facility: CLINIC | Age: 78
End: 2023-01-23
Payer: MEDICARE

## 2023-01-23 VITALS
WEIGHT: 188.69 LBS | DIASTOLIC BLOOD PRESSURE: 60 MMHG | SYSTOLIC BLOOD PRESSURE: 124 MMHG | HEART RATE: 73 BPM | BODY MASS INDEX: 33.43 KG/M2 | OXYGEN SATURATION: 99 %

## 2023-01-23 DIAGNOSIS — E21.0 PRIMARY HYPERPARATHYROIDISM: ICD-10-CM

## 2023-01-23 DIAGNOSIS — J44.9 CHRONIC OBSTRUCTIVE PULMONARY DISEASE, UNSPECIFIED COPD TYPE: ICD-10-CM

## 2023-01-23 DIAGNOSIS — I10 PRIMARY HYPERTENSION: ICD-10-CM

## 2023-01-23 DIAGNOSIS — C50.111 MALIGNANT NEOPLASM OF CENTRAL PORTION OF RIGHT FEMALE BREAST, UNSPECIFIED ESTROGEN RECEPTOR STATUS: ICD-10-CM

## 2023-01-23 DIAGNOSIS — T45.1X5A CHEMOTHERAPY-INDUCED NEUROPATHY: ICD-10-CM

## 2023-01-23 DIAGNOSIS — E78.00 HYPERCHOLESTEROLEMIA: ICD-10-CM

## 2023-01-23 DIAGNOSIS — R00.2 PALPITATIONS: Primary | ICD-10-CM

## 2023-01-23 DIAGNOSIS — E66.01 SEVERE OBESITY: ICD-10-CM

## 2023-01-23 DIAGNOSIS — G62.0 CHEMOTHERAPY-INDUCED NEUROPATHY: ICD-10-CM

## 2023-01-23 PROCEDURE — 1159F PR MEDICATION LIST DOCUMENTED IN MEDICAL RECORD: ICD-10-PCS | Mod: CPTII,S$GLB,, | Performed by: INTERNAL MEDICINE

## 2023-01-23 PROCEDURE — 99214 OFFICE O/P EST MOD 30 MIN: CPT | Mod: S$GLB,,, | Performed by: INTERNAL MEDICINE

## 2023-01-23 PROCEDURE — 99214 PR OFFICE/OUTPT VISIT, EST, LEVL IV, 30-39 MIN: ICD-10-PCS | Mod: S$GLB,,, | Performed by: INTERNAL MEDICINE

## 2023-01-23 PROCEDURE — 99999 PR PBB SHADOW E&M-EST. PATIENT-LVL III: CPT | Mod: PBBFAC,,, | Performed by: INTERNAL MEDICINE

## 2023-01-23 PROCEDURE — 3078F PR MOST RECENT DIASTOLIC BLOOD PRESSURE < 80 MM HG: ICD-10-PCS | Mod: CPTII,S$GLB,, | Performed by: INTERNAL MEDICINE

## 2023-01-23 PROCEDURE — 1160F PR REVIEW ALL MEDS BY PRESCRIBER/CLIN PHARMACIST DOCUMENTED: ICD-10-PCS | Mod: CPTII,S$GLB,, | Performed by: INTERNAL MEDICINE

## 2023-01-23 PROCEDURE — 1126F PR PAIN SEVERITY QUANTIFIED, NO PAIN PRESENT: ICD-10-PCS | Mod: CPTII,S$GLB,, | Performed by: INTERNAL MEDICINE

## 2023-01-23 PROCEDURE — 1101F PR PT FALLS ASSESS DOC 0-1 FALLS W/OUT INJ PAST YR: ICD-10-PCS | Mod: CPTII,S$GLB,, | Performed by: INTERNAL MEDICINE

## 2023-01-23 PROCEDURE — 1126F AMNT PAIN NOTED NONE PRSNT: CPT | Mod: CPTII,S$GLB,, | Performed by: INTERNAL MEDICINE

## 2023-01-23 PROCEDURE — 99999 PR PBB SHADOW E&M-EST. PATIENT-LVL III: ICD-10-PCS | Mod: PBBFAC,,, | Performed by: INTERNAL MEDICINE

## 2023-01-23 PROCEDURE — 1160F RVW MEDS BY RX/DR IN RCRD: CPT | Mod: CPTII,S$GLB,, | Performed by: INTERNAL MEDICINE

## 2023-01-23 PROCEDURE — 1101F PT FALLS ASSESS-DOCD LE1/YR: CPT | Mod: CPTII,S$GLB,, | Performed by: INTERNAL MEDICINE

## 2023-01-23 PROCEDURE — 3078F DIAST BP <80 MM HG: CPT | Mod: CPTII,S$GLB,, | Performed by: INTERNAL MEDICINE

## 2023-01-23 PROCEDURE — 3288F FALL RISK ASSESSMENT DOCD: CPT | Mod: CPTII,S$GLB,, | Performed by: INTERNAL MEDICINE

## 2023-01-23 PROCEDURE — 3288F PR FALLS RISK ASSESSMENT DOCUMENTED: ICD-10-PCS | Mod: CPTII,S$GLB,, | Performed by: INTERNAL MEDICINE

## 2023-01-23 PROCEDURE — 3074F PR MOST RECENT SYSTOLIC BLOOD PRESSURE < 130 MM HG: ICD-10-PCS | Mod: CPTII,S$GLB,, | Performed by: INTERNAL MEDICINE

## 2023-01-23 PROCEDURE — 3074F SYST BP LT 130 MM HG: CPT | Mod: CPTII,S$GLB,, | Performed by: INTERNAL MEDICINE

## 2023-01-23 PROCEDURE — 1159F MED LIST DOCD IN RCRD: CPT | Mod: CPTII,S$GLB,, | Performed by: INTERNAL MEDICINE

## 2023-01-23 NOTE — PROGRESS NOTES
[facility]    Chief Complaint  Chief Complaint   Patient presents with    Follow-up       HPI:    Presents for routine follow-up without any concerns.  Recent asthma exacerbation was leading to more shortness of breath.  Had an echocardiogram performed at [facility] which was unremarkable.  CT scan also noted.    Medications  Current Outpatient Medications   Medication Sig Dispense Refill    albuterol (ACCUNEB) 0.63 mg/3 mL Nebu Take 0.63 mg by nebulization every 6 (six) hours as needed.      alendronate (FOSAMAX) 70 MG tablet Take 70 mg by mouth.      amLODIPine (NORVASC) 5 MG tablet Take 5 mg by mouth once daily.       ascorbic acid, vitamin C, (VITAMIN C) 500 MG tablet Take 500 mg by mouth.      calcipotriene (DOVONOX) 0.005 % cream Apply topically 2 (two) times daily as needed.      cetirizine (ZYRTEC) 10 MG tablet Take 10 mg by mouth once daily.      fluticasone propionate (FLONASE) 50 mcg/actuation nasal spray 1 spray by Each Nostril route.      fluticasone propionate (FLOVENT HFA) 220 mcg/actuation inhaler Take 2 puffs by mouth 2 (two) times daily.      gabapentin (NEURONTIN) 100 MG capsule Take 100 mg by mouth 2 (two) times daily.      irbesartan (AVAPRO) 300 MG tablet Take 300 mg by mouth once daily.       levocetirizine (XYZAL) 5 MG tablet       levothyroxine (SYNTHROID) 75 MCG tablet Take 75 mcg by mouth once daily.      meloxicam (MOBIC) 15 MG tablet Take 15 mg by mouth once daily.      omeprazole (PRILOSEC) 20 MG capsule       pravastatin (PRAVACHOL) 40 MG tablet Take 1 tablet (40 mg total) by mouth once daily. 90 tablet 3    traMADoL (ULTRAM) 50 mg tablet Take 50 mg by mouth.      vitamin D (VITAMIN D3) 1000 units Tab Take 1,000 Units by mouth.       No current facility-administered medications for this visit.        History  Past Medical History:   Diagnosis Date    Asthma     Breast cancer     Fibromyalgia     Hypertension     Psoriasis vulgaris      Past Surgical History:   Procedure  Laterality Date    APPENDECTOMY      BREAST LUMPECTOMY  7-    breast cancer    BUNIONECTOMY  2010    CORONARY ANGIOGRAPHY      no obstructive CAD    EPIDURAL STEROID INJECTION N/A 2020    Procedure: LUMBAR L2/3 NANCY DIRECT REFERRAL;  Surgeon: Ion Bearden MD;  Location: [facility];  Service: Pain Management;  Laterality: N/A;  NEEDS CONSENT    KNEE ARTHROSCOPY Right 2004    SHOULDER SURGERY  1982    right/had a bone infectioni    THYROIDECTOMY  1977    hyperparathyroid&thyroidectomy 2006    TOTAL KNEE ARTHROPLASTY  10/13/2014     Social History     Socioeconomic History    Marital status:    Tobacco Use    Smoking status: Former     Types: Cigarettes     Quit date: 1999     Years since quittin.5    Smokeless tobacco: Never   Substance and Sexual Activity    Alcohol use: No    Drug use: No    Sexual activity: Never     Family History   Problem Relation Age of Onset    Heart disease Mother     Hypertension Mother     Heart disease Father     Hypertension Father     Diabetes Brother     Hypertension Brother     Heart disease Brother     Kidney disease Brother     Skin cancer Neg Hx     Psoriasis Neg Hx         Allergies  Review of patient's allergies indicates:   Allergen Reactions    Atorvastatin Other (See Comments)    Rosuvastatin Other (See Comments)     Felt vibration/myalgia     Sulfamethoxazole-trimethoprim Swelling, Diarrhea and Other (See Comments)       Review of Systems   Review of Systems   Constitutional: Negative for malaise/fatigue, weight gain and weight loss.   Eyes:  Negative for visual disturbance.   Cardiovascular:  Negative for chest pain, claudication, cyanosis, dyspnea on exertion, irregular heartbeat, leg swelling, near-syncope, orthopnea, palpitations, paroxysmal nocturnal dyspnea and syncope.   Respiratory:  Negative for cough, hemoptysis, shortness of breath, sleep disturbances due to breathing and wheezing.    Hematologic/Lymphatic: Negative for  bleeding problem. Does not bruise/bleed easily.   Skin:  Negative for poor wound healing.   Musculoskeletal:  Negative for muscle cramps and myalgias.   Gastrointestinal:  Negative for abdominal pain, anorexia, diarrhea, heartburn, hematemesis, hematochezia, melena, nausea and vomiting.   Genitourinary:  Negative for hematuria and nocturia.   Neurological:  Negative for excessive daytime sleepiness, dizziness, focal weakness, light-headedness and weakness.     Physical Exam  Vitals:    01/23/23 1047   BP: 124/60   Pulse: 73     Wt Readings from Last 1 Encounters:   01/23/23 85.6 kg (188 lb 11.2 oz)     Physical Exam  Constitutional:       General: She is not in acute distress.     Appearance: She is obese. She is not toxic-appearing or diaphoretic.   HENT:      Head: Normocephalic and atraumatic.   Eyes:      General: No scleral icterus.     Conjunctiva/sclera: Conjunctivae normal.   Neck:      Thyroid: No thyromegaly.      Vascular: No carotid bruit, hepatojugular reflux or JVD.      Trachea: Trachea normal.   Cardiovascular:      Rate and Rhythm: Normal rate and regular rhythm. No extrasystoles are present.     Chest Wall: PMI is not displaced.      Pulses:           Carotid pulses are 2+ on the right side and 2+ on the left side.       Radial pulses are 2+ on the right side and 2+ on the left side.        Dorsalis pedis pulses are 2+ on the right side and 2+ on the left side.        Posterior tibial pulses are 2+ on the right side and 2+ on the left side.      Heart sounds: S1 normal and S2 normal. No murmur heard.    No S3 or S4 sounds.   Pulmonary:      Effort: No accessory muscle usage or respiratory distress.      Breath sounds: No decreased breath sounds, wheezing, rhonchi or rales.   Abdominal:      General: Bowel sounds are normal. There is no abdominal bruit.      Palpations: Abdomen is soft. There is no hepatomegaly, splenomegaly or pulsatile mass.      Tenderness: There is no abdominal tenderness.    Musculoskeletal:         General: No tenderness or deformity.      Right lower leg: No edema.      Left lower leg: No edema.   Skin:     General: Skin is warm and dry.      Coloration: Skin is not cyanotic or pale.      Nails: There is no clubbing.   Neurological:      General: No focal deficit present.      Mental Status: She is alert and oriented to person, place, and time.   Psychiatric:         Speech: Speech normal.         Behavior: Behavior normal. Behavior is cooperative.       Labs  Office Visit on 08/24/2022   Component Date Value Ref Range Status    POC Glucose 08/24/2022 123 (A)  70 - 110 MG/DL Final    Hemoglobin 08/24/2022 14.4  12.0 - 15.0 g/dL Final    POC Blood, Urine 08/24/2022 Negative  Negative Final    POC Bilirubin, Urine 08/24/2022 Negative  Negative Final    POC Urobilinogen, Urine 08/24/2022 normal  0.1 - 1.1 Final    POC Ketones, Urine 08/24/2022 Negative  Negative Final    POC Protein, Urine 08/24/2022 Negative  Negative Final    POC Nitrates, Urine 08/24/2022 Negative  Negative Final    POC Glucose, Urine 08/24/2022 Negative  Negative Final    pH, UA 08/24/2022 8.0  5 - 8 Final    POC Specific Gravity, Urine 08/24/2022 1.010  1.003 - 1.029 Final    POC Leukocytes, Urine 08/24/2022 Negative  Negative Final       Imaging  No results found.    Assessment  1. Palpitations  No change in her symptoms.  Previous workup unrevealing.    2. Severe obesity  Unchanged    3. Hypercholesterolemia  LDL has increased slightly.  Reinforced lifestyle modification    4. Primary hypertension  Controlled    5. Chronic obstructive pulmonary disease, unspecified COPD type  Recent exacerbation but now stable    6. Malignant neoplasm of central portion of right female breast, unspecified estrogen receptor status  Not currently on treatment    7. Chemotherapy-induced neuropathy  Unchanged    8. Primary hyperparathyroidism  Treated      Plan and Discussion    Continue current guideline directed medical therapy.   Work on lifestyle modification efforts.    The 10-year ASCVD risk score (Bob SANCHEZ, et al., 2019) is: 18.1%    Values used to calculate the score:      Age: 77 years      Sex: Female      Is Non- : Yes      Diabetic: No      Tobacco smoker: No      Systolic Blood Pressure: 124 mmHg      Is BP treated: Yes      HDL Cholesterol: 75 mg/dL      Total Cholesterol: 166 mg/dL     Follow Up  Follow up in about 1 year (around 1/23/2024).      Bryan Mejía MD

## 2023-03-07 ENCOUNTER — TELEPHONE (OUTPATIENT)
Dept: CARDIOLOGY | Facility: CLINIC | Age: 78
End: 2023-03-07
Payer: MEDICARE

## 2023-03-07 NOTE — TELEPHONE ENCOUNTER
----- Message from Teresita Infante sent at 3/7/2023 11:42 AM CST -----  Regarding: refill  Type: RX Refill Request    Who Called: Thelma     Have you contacted your pharmacy:    Refill or New Rx:refill     RX Name and Strength:ezetimibe (ZETIA) 10 mg tablet     How is the patient currently taking it? (ex. 1XDay):    Is this a 30 day or 90 day RX:    Preferred Pharmacy with phone number:[facility]/pharmacy #[id number] - [place] - [address]    Local or Mail Order: local     Ordering Provider:    Would the patient rather a call back or a response via [facility]? Call     Best Call Back Number:[phone]    Additional Information:        Unknown if ever smoked

## 2023-05-03 DIAGNOSIS — E78.00 HYPERCHOLESTEROLEMIA: Primary | ICD-10-CM

## 2023-05-03 RX ORDER — EZETIMIBE 10 MG/1
10 TABLET ORAL DAILY
Qty: 90 TABLET | Refills: 3 | Status: SHIPPED | OUTPATIENT
Start: 2023-05-03 | End: 2024-05-02

## 2023-05-03 NOTE — TELEPHONE ENCOUNTER
----- Message from Vilma Cooney sent at 5/3/2023  2:43 PM CDT -----  Regarding: rx ques  Name of Who is Calling:MATILDA HILL [[id number]]          What is the request in detail: pt is asking if she can get back on ezetimibe (ZETIA) 10 mg tablet/ she stopped taking it about a year ago and she is asking if she can get back on. Please c/b to discuss          Can the clinic reply by [facility]:          What Number to Call Back if not in [facility]:[phone]

## 2024-01-23 ENCOUNTER — OFFICE VISIT (OUTPATIENT)
Dept: CARDIOLOGY | Facility: CLINIC | Age: 79
End: 2024-01-23
Payer: MEDICARE

## 2024-01-23 VITALS
BODY MASS INDEX: 29.21 KG/M2 | HEART RATE: 71 BPM | DIASTOLIC BLOOD PRESSURE: 60 MMHG | WEIGHT: 164.88 LBS | SYSTOLIC BLOOD PRESSURE: 122 MMHG | OXYGEN SATURATION: 98 %

## 2024-01-23 DIAGNOSIS — J44.9 CHRONIC OBSTRUCTIVE PULMONARY DISEASE, UNSPECIFIED COPD TYPE: ICD-10-CM

## 2024-01-23 DIAGNOSIS — E21.0 PRIMARY HYPERPARATHYROIDISM: ICD-10-CM

## 2024-01-23 DIAGNOSIS — E78.00 HYPERCHOLESTEROLEMIA: ICD-10-CM

## 2024-01-23 DIAGNOSIS — E66.01 SEVERE OBESITY: ICD-10-CM

## 2024-01-23 DIAGNOSIS — C50.111 MALIGNANT NEOPLASM OF CENTRAL PORTION OF RIGHT FEMALE BREAST, UNSPECIFIED ESTROGEN RECEPTOR STATUS: ICD-10-CM

## 2024-01-23 DIAGNOSIS — T45.1X5A CHEMOTHERAPY-INDUCED NEUROPATHY: ICD-10-CM

## 2024-01-23 DIAGNOSIS — R00.2 PALPITATIONS: Primary | ICD-10-CM

## 2024-01-23 DIAGNOSIS — I10 PRIMARY HYPERTENSION: ICD-10-CM

## 2024-01-23 DIAGNOSIS — G62.0 CHEMOTHERAPY-INDUCED NEUROPATHY: ICD-10-CM

## 2024-01-23 PROCEDURE — 99214 OFFICE O/P EST MOD 30 MIN: CPT | Mod: S$GLB,,, | Performed by: INTERNAL MEDICINE

## 2024-01-23 PROCEDURE — 99999 PR PBB SHADOW E&M-EST. PATIENT-LVL III: CPT | Mod: PBBFAC,,, | Performed by: INTERNAL MEDICINE

## 2024-01-23 NOTE — PROGRESS NOTES
[facility]    Chief Complaint  Chief Complaint   Patient presents with    Follow-up       HPI:    She has been doing well the past year.  No cardiac issues.  No palpitations.  Being evaluated and treated for what sounds like a cervical radiculopathy.    Medications  Current Outpatient Medications   Medication Sig Dispense Refill    albuterol (ACCUNEB) 0.63 mg/3 mL Nebu Take 0.63 mg by nebulization every 6 (six) hours as needed.      alendronate (FOSAMAX) 70 MG tablet Take 70 mg by mouth.      amLODIPine (NORVASC) 5 MG tablet Take 5 mg by mouth once daily.       ascorbic acid, vitamin C, (VITAMIN C) 500 MG tablet Take 500 mg by mouth.      calcipotriene (DOVONOX) 0.005 % cream Apply topically 2 (two) times daily as needed.      cetirizine (ZYRTEC) 10 MG tablet Take 10 mg by mouth once daily.      ezetimibe (ZETIA) 10 mg tablet Take 1 tablet (10 mg total) by mouth once daily. 90 tablet 3    fluticasone propionate (FLONASE) 50 mcg/actuation nasal spray 1 spray by Each Nostril route.      fluticasone propionate (FLOVENT HFA) 220 mcg/actuation inhaler Take 2 puffs by mouth 2 (two) times daily.      gabapentin (NEURONTIN) 100 MG capsule Take 100 mg by mouth 2 (two) times daily.      irbesartan (AVAPRO) 300 MG tablet Take 300 mg by mouth once daily.       levocetirizine (XYZAL) 5 MG tablet       levothyroxine (SYNTHROID) 75 MCG tablet Take 75 mcg by mouth once daily.      meloxicam (MOBIC) 15 MG tablet Take 15 mg by mouth once daily.      omeprazole (PRILOSEC) 20 MG capsule       pravastatin (PRAVACHOL) 40 MG tablet TAKE 1 TABLET BY MOUTH EVERY DAY 90 tablet 3    traMADoL (ULTRAM) 50 mg tablet Take 50 mg by mouth.      vitamin D (VITAMIN D3) 1000 units Tab Take 1,000 Units by mouth.       No current facility-administered medications for this visit.        History  Past Medical History:   Diagnosis Date    Asthma     Breast cancer     Fibromyalgia     Hypertension     Psoriasis vulgaris      Past Surgical  History:   Procedure Laterality Date    APPENDECTOMY      BREAST LUMPECTOMY  5-    breast cancer    BUNIONECTOMY  2010    CORONARY ANGIOGRAPHY      no obstructive CAD    EPIDURAL STEROID INJECTION N/A 2020    Procedure: LUMBAR L2/3 NANCY DIRECT REFERRAL;  Surgeon: Ion Bearden MD;  Location: [facility];  Service: Pain Management;  Laterality: N/A;  NEEDS CONSENT    KNEE ARTHROSCOPY Right 2004    SHOULDER SURGERY  1982    right/had a bone infectioni    THYROIDECTOMY  1977    hyperparathyroid&thyroidectomy 2006    TOTAL KNEE ARTHROPLASTY  10/13/2014     Social History     Socioeconomic History    Marital status:    Tobacco Use    Smoking status: Former     Current packs/day: 0.00     Types: Cigarettes     Quit date: 1999     Years since quittin.5    Smokeless tobacco: Never   Substance and Sexual Activity    Alcohol use: No    Drug use: No    Sexual activity: Never     Family History   Problem Relation Age of Onset    Heart disease Mother     Hypertension Mother     Heart disease Father     Hypertension Father     Diabetes Brother     Hypertension Brother     Heart disease Brother     Kidney disease Brother     Skin cancer Neg Hx     Psoriasis Neg Hx         Allergies  Review of patient's allergies indicates:   Allergen Reactions    Atorvastatin Other (See Comments)    Rosuvastatin Other (See Comments)     Felt vibration/myalgia     Sulfamethoxazole-trimethoprim Swelling, Diarrhea and Other (See Comments)       Review of Systems   Review of Systems   Constitutional: Negative for malaise/fatigue, weight gain and weight loss.   Eyes:  Negative for visual disturbance.   Cardiovascular:  Negative for chest pain, claudication, cyanosis, dyspnea on exertion, irregular heartbeat, leg swelling, near-syncope, orthopnea, palpitations, paroxysmal nocturnal dyspnea and syncope.   Respiratory:  Negative for cough, hemoptysis, shortness of breath, sleep disturbances due to breathing and  wheezing.    Hematologic/Lymphatic: Negative for bleeding problem. Does not bruise/bleed easily.   Skin:  Negative for poor wound healing.   Musculoskeletal:  Negative for muscle cramps and myalgias.   Gastrointestinal:  Negative for abdominal pain, anorexia, diarrhea, heartburn, hematemesis, hematochezia, melena, nausea and vomiting.   Genitourinary:  Negative for hematuria and nocturia.   Neurological:  Negative for excessive daytime sleepiness, dizziness, focal weakness, light-headedness and weakness.       Physical Exam  Vitals:    01/23/24 1058   BP: 122/60   Pulse: 71     Wt Readings from Last 1 Encounters:   01/23/24 74.8 kg (164 lb 14.4 oz)     Physical Exam  Constitutional:       General: She is not in acute distress.     Appearance: She is obese. She is not toxic-appearing or diaphoretic.   HENT:      Head: Normocephalic and atraumatic.   Eyes:      General: No scleral icterus.     Conjunctiva/sclera: Conjunctivae normal.   Neck:      Thyroid: No thyromegaly.      Vascular: No carotid bruit, hepatojugular reflux or JVD.      Trachea: Trachea normal.   Cardiovascular:      Rate and Rhythm: Normal rate and regular rhythm. No extrasystoles are present.     Chest Wall: PMI is not displaced.      Pulses:           Carotid pulses are 2+ on the right side and 2+ on the left side.       Radial pulses are 2+ on the right side and 2+ on the left side.        Dorsalis pedis pulses are 2+ on the right side and 2+ on the left side.        Posterior tibial pulses are 2+ on the right side and 2+ on the left side.      Heart sounds: S1 normal and S2 normal. No murmur heard.     No S3 or S4 sounds.   Pulmonary:      Effort: No accessory muscle usage or respiratory distress.      Breath sounds: No decreased breath sounds, wheezing, rhonchi or rales.   Abdominal:      General: Bowel sounds are normal. There is no abdominal bruit.      Palpations: Abdomen is soft. There is no hepatomegaly, splenomegaly or pulsatile mass.       Tenderness: There is no abdominal tenderness.   Musculoskeletal:         General: No tenderness or deformity.      Right lower leg: No edema.      Left lower leg: No edema.   Skin:     General: Skin is warm and dry.      Coloration: Skin is not cyanotic or pale.      Nails: There is no clubbing.   Neurological:      General: No focal deficit present.      Mental Status: She is alert and oriented to person, place, and time.   Psychiatric:         Speech: Speech normal.         Behavior: Behavior normal. Behavior is cooperative.         Labs  No visits with results within 6 Month(s) from this visit.   Latest known visit with results is:   Office Visit on 08/24/2022   Component Date Value Ref Range Status    POC Glucose 08/24/2022 123 (A)  70 - 110 MG/DL Final    Hemoglobin 08/24/2022 14.4  12.0 - 15.0 g/dL Final    POC Blood, Urine 08/24/2022 Negative  Negative Final    POC Bilirubin, Urine 08/24/2022 Negative  Negative Final    POC Urobilinogen, Urine 08/24/2022 normal  0.1 - 1.1 Final    POC Ketones, Urine 08/24/2022 Negative  Negative Final    POC Protein, Urine 08/24/2022 Negative  Negative Final    POC Nitrates, Urine 08/24/2022 Negative  Negative Final    POC Glucose, Urine 08/24/2022 Negative  Negative Final    pH, UA 08/24/2022 8.0  5 - 8 Final    POC Specific Gravity, Urine 08/24/2022 1.010  1.003 - 1.029 Final    POC Leukocytes, Urine 08/24/2022 Negative  Negative Final       Imaging  X-Ray Chest 1 View    Result Date: 1/3/2024  FRONTAL VIEW OF THE CHEST: CPT CODE: 69463 INDICATION: HEADACHE COMPARISON STUDY:Chest x-ray of 9/20/2022 FINDINGS: A frontal view of the chest was obtained. Heart size is normal. Central pulmonary vascularity is unremarkable. Calcification is present in the aortic knob. The peripheral lung fields are clear. There is mild thoracic spondylosis. There are some surgical clips just to the left of midline at the cervicothoracic junction..    1. Atherosclerotic vascular disease. 2. There  is a vertical line of surgical clips just to the left of midline at the level of, and just distal to, the cervicothoracic junction. 3. No acute pulmonic process. Electronically Signed By: iWlton Tomlin MD 1/3/2024 3:42 PM CST    CT Head Without Contrast    Result Date: 1/3/2024  CLINICAL HISTORY: Headache COMPARISON: 7/20/2023 TECHNIQUE:  Contiguous 5 mm axial nonenhanced images were obtained throughout the brain.  An individualized dose optimization technique, automated exposure control, was utilized for the performed procedure. FINDINGS: There are non-specific periventricular and punctate subcortical white matter hypodensities involving both cerebral hemispheres.  There is normal gray white matter differentiation.  No intraparenchymal hemorrhage is identified. No intracranial mass or mass effect identified.  No extra axial fluid collections are seen. The posterior fossa appears unremarkable    Stable nonspecific bilateral cerebral hemisphere white matter disease, likely representing chronic small vessel ischemic change. No significant change compared with previous exam. Electronically Signed By: Camelia Qiu 1/3/2024 3:14 PM CST      Assessment  1. Palpitations  Resolved    2. Primary hypertension  Controlled    3. Hypercholesterolemia  Controlled    4. Severe obesity  Unchanged    5. Chronic obstructive pulmonary disease, unspecified COPD type  Stable    6. Malignant neoplasm of central portion of right female breast, unspecified estrogen receptor status  Stable    7. Chemotherapy-induced neuropathy  Unchanged    8. Primary hyperparathyroidism  Stable      Plan and Discussion    No change to current guideline directed medical therapy.    The ASCVD Risk score (Bob SANCHEZ, et al., 2019) failed to calculate for the following reasons:    The 2019 ASCVD risk score is only valid for ages 40 to 79     Follow Up  Follow up in about 1 year (around 1/23/2025).      Bryan Mejía MD

## 2024-02-21 DIAGNOSIS — E78.00 HYPERCHOLESTEROLEMIA: ICD-10-CM

## 2024-02-21 RX ORDER — PRAVASTATIN SODIUM 40 MG/1
TABLET ORAL
Qty: 90 TABLET | Refills: 3 | Status: SHIPPED | OUTPATIENT
Start: 2024-02-21

## 2024-04-20 DIAGNOSIS — E78.00 HYPERCHOLESTEROLEMIA: ICD-10-CM

## 2024-04-22 RX ORDER — EZETIMIBE 10 MG/1
10 TABLET ORAL
Qty: 90 TABLET | Refills: 3 | Status: SHIPPED | OUTPATIENT
Start: 2024-04-22

## 2024-08-16 ENCOUNTER — TELEPHONE (OUTPATIENT)
Dept: CARDIOLOGY | Facility: CLINIC | Age: 79
End: 2024-08-16
Payer: MEDICARE

## 2024-08-16 NOTE — TELEPHONE ENCOUNTER
Spoke with Dr. Mejía.  He does not believe symptoms are cardiac related but we will see patient in clinic next week to assess her current status.

## 2024-08-16 NOTE — TELEPHONE ENCOUNTER
----- Message from Kaylynbarbara Delacruz sent at 8/16/2024  9:34 AM CDT -----  Regarding: questions and concerns  Type:  Patient Returning Call      Name of who is calling:April/Daughter        What is request in detail:April is requesting call back in regards to questions and concerns about patients health. The patient has  been tired, light headed, confused, weak etc. She has had changes in mental capacity in the last to 2 months. 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        Can clinic reply by [facility]:no        [facility][phone]

## 2024-08-23 ENCOUNTER — OFFICE VISIT (OUTPATIENT)
Dept: CARDIOLOGY | Facility: CLINIC | Age: 79
End: 2024-08-23
Payer: MEDICARE

## 2024-08-23 DIAGNOSIS — E78.00 HYPERCHOLESTEROLEMIA: Primary | ICD-10-CM

## 2024-08-23 DIAGNOSIS — I10 PRIMARY HYPERTENSION: ICD-10-CM

## 2024-08-23 NOTE — PROGRESS NOTES
[facility] CARDIOLOGY    The patient location is:  Her home in [place]  The chief complaint leading to consultation is:  Mental status changes    Visit type: audio only Could not get video working    Face to Face time with patient: 7 minutes  12 minutes of total time spent on the encounter, which includes face to face time and non-face to face time preparing to see the patient (eg, review of tests), Obtaining and/or reviewing separately obtained history, Documenting clinical information in the electronic or other health record, Independently interpreting results (not separately reported) and communicating results to the patient/family/caregiver, or Care coordination (not separately reported).     Each patient to whom he or she provides medical services by telemedicine is:  (1) informed of the relationship between the physician and patient and the respective role of any other health care provider with respect to management of the patient; and (2) notified that he or she may decline to receive medical services by telemedicine and may withdraw from such care at any time.      Chief Complaint  Chief Complaint   Patient presents with    Follow-up       HPI:    Spoke with patient's family.  Patient seems had a rapid, significant decline in her mental and functional status.  Is now unable to care for herself.  Two weeks ago was in the [facility] Emergency Department with shortness of breath.  Reviewed that data.  No cardiac issues.  Family is concerned about the reports of bradycardia.  She had sinus bradycardia at 58 on her EKG which was otherwise normal.  Has been evaluated by Neurology.    Medications  Current Outpatient Medications   Medication Sig Dispense Refill    albuterol (ACCUNEB) 0.63 mg/3 mL Nebu Take 0.63 mg by nebulization every 6 (six) hours as needed.      alendronate (FOSAMAX) 70 MG tablet Take 70 mg by mouth.      amLODIPine (NORVASC) 5 MG tablet Take 5 mg by mouth once daily.       ascorbic acid,  vitamin C, (VITAMIN C) 500 MG tablet Take 500 mg by mouth.      calcipotriene (DOVONOX) 0.005 % cream Apply topically 2 (two) times daily as needed.      cetirizine (ZYRTEC) 10 MG tablet Take 10 mg by mouth once daily.      ezetimibe (ZETIA) 10 mg tablet TAKE 1 TABLET BY MOUTH EVERY DAY 90 tablet 3    fluticasone propionate (FLONASE) 50 mcg/actuation nasal spray 1 spray by Each Nostril route.      fluticasone propionate (FLOVENT HFA) 220 mcg/actuation inhaler Take 2 puffs by mouth 2 (two) times daily.      gabapentin (NEURONTIN) 100 MG capsule Take 100 mg by mouth 2 (two) times daily.      irbesartan (AVAPRO) 300 MG tablet Take 300 mg by mouth once daily.       levocetirizine (XYZAL) 5 MG tablet       levothyroxine (SYNTHROID) 75 MCG tablet Take 75 mcg by mouth once daily.      omeprazole (PRILOSEC) 20 MG capsule       pravastatin (PRAVACHOL) 40 MG tablet TAKE 1 TABLET BY MOUTH EVERY DAY 90 tablet 3    vitamin D (VITAMIN D3) 1000 units Tab Take 1,000 Units by mouth.       No current facility-administered medications for this visit.        History  Past Medical History:   Diagnosis Date    Asthma     Breast cancer     Fibromyalgia     Hypertension     Psoriasis vulgaris      Past Surgical History:   Procedure Laterality Date    APPENDECTOMY  1962    BREAST LUMPECTOMY  5-    breast cancer    BUNIONECTOMY  2010    CORONARY ANGIOGRAPHY  2014    no obstructive CAD    EPIDURAL STEROID INJECTION N/A 8/11/2020    Procedure: LUMBAR L2/3 NANCY DIRECT REFERRAL;  Surgeon: Ion Bearden MD;  Location: [facility];  Service: Pain Management;  Laterality: N/A;  NEEDS CONSENT    KNEE ARTHROSCOPY Right 2004    SHOULDER SURGERY  1982    right/had a bone infectioni    THYROIDECTOMY  1977    hyperparathyroid&thyroidectomy 2006    TOTAL KNEE ARTHROPLASTY  10/13/2014     Social History     Socioeconomic History    Marital status:    Tobacco Use    Smoking status: Former     Current packs/day: 0.00     Types:  Cigarettes     Quit date: 1999     Years since quittin.1    Smokeless tobacco: Never   Substance and Sexual Activity    Alcohol use: No    Drug use: No    Sexual activity: Never     Social Determinants of Health     Financial Resource Strain: Medium Risk (2024)    Overall Financial Resource Strain (CARDIA)     Difficulty of Paying Living Expenses: Somewhat hard   Food Insecurity: Patient Declined (2024)    Hunger Vital Sign     Worried About Running Out of Food in the Last Year: Patient declined     Ran Out of Food in the Last Year: Patient declined   Transportation Needs: No Transportation Needs (2021)    Received from [facility] [employer], [facility]    PRAPARE - Transportation     Lack of Transportation (Medical): No     Lack of Transportation (Non-Medical): No   Physical Activity: Inactive (2024)    Exercise Vital Sign     Days of Exercise per Week: 0 days     Minutes of Exercise per Session: 0 min   Stress: No Stress Concern Present (2024)    [employer] - Occupational Stress Questionnaire     Feeling of Stress : Not at all   Housing Stability: Unknown (2024)    Housing Stability Vital Sign     Unable to Pay for Housing in the Last Year: Patient declined     Family History   Problem Relation Name Age of Onset    Heart disease Mother      Hypertension Mother      Heart disease Father      Hypertension Father      Diabetes Brother      Hypertension Brother      Heart disease Brother      Kidney disease Brother      Skin cancer Neg Hx      Psoriasis Neg Hx         Allergies  Review of patient's allergies indicates:   Allergen Reactions    Atorvastatin Other (See Comments)    Rosuvastatin Other (See Comments)     Felt vibration/myalgia     Sulfamethoxazole-trimethoprim Swelling, Diarrhea and Other (See Comments)       Review of Systems   ROS    Physical Exam  There were no vitals filed for this visit.  Wt Readings from Last 1 Encounters:   24 74.8  kg (164 lb 14.4 oz)     Physical Exam    Labs  No visits with results within 6 Month(s) from this visit.   Latest known visit with results is:   Office Visit on 08/24/2022   Component Date Value Ref Range Status    POC Glucose 08/24/2022 123 (A)  70 - 110 MG/DL Final    Hemoglobin 08/24/2022 14.4  12.0 - 15.0 g/dL Final    POC Blood, Urine 08/24/2022 Negative  Negative Final    POC Bilirubin, Urine 08/24/2022 Negative  Negative Final    POC Urobilinogen, Urine 08/24/2022 normal  0.1 - 1.1 Final    POC Ketones, Urine 08/24/2022 Negative  Negative Final    POC Protein, Urine 08/24/2022 Negative  Negative Final    POC Nitrates, Urine 08/24/2022 Negative  Negative Final    POC Glucose, Urine 08/24/2022 Negative  Negative Final    pH, UA 08/24/2022 8.0  5 - 8 Final    POC Specific Gravity, Urine 08/24/2022 1.010  1.003 - 1.029 Final    POC Leukocytes, Urine 08/24/2022 Negative  Negative Final       Imaging  MRI Brain Without Contrast    Result Date: 8/21/2024  EXAMINATION: MR the brain without contrast EXAMINATION DATE: 8/21/2024 COMPARISON: None TECHNIQUE: Multiplanar multisequence images the brain were obtained without the use of contrast. INDICATION: Confusion FINDINGS: Midline structures within normal limits as is the brainstem and proximal spinal cord. Marrow signal pattern is normal. Pituitary gland is normal. No area of restricted diffusion. No defect on ADC map. No significant T-2/flair signal matter is identified. Changes of microangiopathy. Ventricular deep cortical white matter. No abnormal T1 signal. No evidence of blooming artifact and gradient weighted imaging to suggest hemosiderin deposition. No acute ischemic changes or infarct. No intraparenchymal contusion. No mass, mass effect or midline shift, edema, extra fluid condition. The gray-white matter differentiation maintained. Changes of migraine angiopathy. Age-appropriate cerebral and cerebellar volume loss with ex vacuo dilatation. The globes are  intact. The paranasal sinuses mastoid air cells are well pneumatized. The T2 flow voids are normal.    No acute intracranial abnormality. Findings consistent microangiopathy and age-appropriate volume loss. Electronically Signed By: Chaparro Chappell 8/21/2024 2:27 PM CDT    CT Head Without Contrast    Result Date: 8/10/2024  EXAMINATION: CT head without contrast EXAMINATION DATE: 8/10/2024 COMPARISON: 1/3/2024 TECHNIQUE: Multiple cross-sectional obtained from skull apex the skull base without the use contrast. Coronal and sagittal reformatted images obtained. Automated dose exposure technique was utilized. This limits radiation is the patient. INDICATION: Fibromyalgia FINDINGS: No acute ischemic changes or infarct. No intraparenchymal hemorrhage contusion. No mass, mass effect or midline shift, edema, extra axial fluid collection. Gray-white matter degradation maintained. Changes of microangiopathy. The cerebral sulci and basal cisterns are normal. Hydrocephalus. The globes are intact. The paranasal sinuses and mastoid air cells are well pneumatized.    No acute intracranial abnormality. Findings consistent microangiopathy. Electronically Signed By: Chaparro Chappell 8/10/2024 2:18 PM CDT    X-Ray Chest 1 View    Result Date: 8/10/2024  HISTORY: : SHORTNESS OF BREATH EXAM: [facility] XR CHEST 1 VW PORTABLE. PRIOR STUDY: 1/3/2024 FINDINGS: The cardiac mediastinal silhouette and pulmonary vasculature are normal. The lungs and pleural spaces are clear.    No acute cardiopulmonary abnormality. Electronically Signed By: Chaparro Chappell 8/10/2024 1:42 PM CDT      Assessment  1. Hypercholesterolemia  Not a pressing issue to treated this point    2. Primary hypertension  Level of control uncertain      Plan and Discussion    Discussed that she should likely follow-up with her primary care provider for re-evaluation.  Unfortunately given the remote nature of this appointment, thorough cardiac evaluation is not possible.    Follow Up  No follow-ups  on file.      Bryan Mejía MD

## 2025-02-25 DIAGNOSIS — M75.101 UNSP ROTATR-CUFF TEAR/RUPTR OF RIGHT SHOULDER, NOT TRAUMA: Primary | ICD-10-CM

## 2025-02-25 DIAGNOSIS — F02.80 ALZHEIMER'S DISEASE WITH LATE ONSET: ICD-10-CM

## 2025-02-25 DIAGNOSIS — J44.9 CHRONIC OBSTRUCTIVE PULMONARY DISEASE, UNSPECIFIED: Primary | ICD-10-CM

## 2025-02-25 DIAGNOSIS — R35.0 FREQUENCY OF MICTURITION: Primary | ICD-10-CM

## 2025-02-25 DIAGNOSIS — L40.50 ARTHROPATHIC PSORIASIS, UNSPECIFIED: Primary | ICD-10-CM

## 2025-02-25 DIAGNOSIS — G30.1 ALZHEIMER'S DISEASE WITH LATE ONSET: ICD-10-CM

## 2025-02-25 DIAGNOSIS — F01.B11: Primary | ICD-10-CM

## 2025-02-25 DIAGNOSIS — R06.09 OTHER FORMS OF DYSPNEA: ICD-10-CM

## 2025-02-25 DIAGNOSIS — Z86.39 PERSONAL HISTORY OF OTHER ENDOCRINE, NUTRITIONAL AND METABOLIC DISEASE: ICD-10-CM

## 2025-02-25 DIAGNOSIS — E89.0 POSTPROCEDURAL HYPOTHYROIDISM: Primary | ICD-10-CM

## 2025-02-25 DIAGNOSIS — G89.29 OTHER CHRONIC PAIN: ICD-10-CM

## 2025-02-25 DIAGNOSIS — J45.40 MODERATE PERSISTENT ASTHMA, UNCOMPLICATED: ICD-10-CM

## 2025-02-25 DIAGNOSIS — R94.31 ABNORMAL ELECTROCARDIOGRAM (ECG) (EKG): ICD-10-CM

## 2025-02-25 DIAGNOSIS — I25.10 ATHSCL HEART DISEASE OF NATIVE CORONARY ARTERY W/O ANG PCTRS: Primary | ICD-10-CM

## 2025-02-25 DIAGNOSIS — R91.1 SOLITARY PULMONARY NODULE: ICD-10-CM

## 2025-02-25 DIAGNOSIS — L40.50 ARTHROPATHIC PSORIASIS, UNSPECIFIED: ICD-10-CM

## 2025-02-25 DIAGNOSIS — L40.9 PSORIASIS, UNSPECIFIED: ICD-10-CM

## 2025-02-25 DIAGNOSIS — R00.1 BRADYCARDIA, UNSPECIFIED: ICD-10-CM

## 2025-02-25 DIAGNOSIS — G62.0 DRUG-INDUCED POLYNEUROPATHY: Primary | ICD-10-CM

## 2025-02-25 DIAGNOSIS — M79.7 FIBROMYALGIA: ICD-10-CM

## 2025-03-10 ENCOUNTER — OFFICE VISIT (OUTPATIENT)
Dept: UROLOGY | Facility: CLINIC | Age: 80
End: 2025-03-10
Payer: COMMERCIAL

## 2025-03-10 VITALS
WEIGHT: 149.94 LBS | BODY MASS INDEX: 26.57 KG/M2 | SYSTOLIC BLOOD PRESSURE: 147 MMHG | HEART RATE: 58 BPM | HEIGHT: 63 IN | DIASTOLIC BLOOD PRESSURE: 75 MMHG

## 2025-03-10 DIAGNOSIS — R35.0 FREQUENCY OF MICTURITION: ICD-10-CM

## 2025-03-10 DIAGNOSIS — I25.10 CORONARY ATHEROSCLEROSIS OF NATIVE CORONARY ARTERY: Primary | ICD-10-CM

## 2025-03-10 DIAGNOSIS — J81.0 POSTOPERATIVE PULMONARY EDEMA: ICD-10-CM

## 2025-03-10 DIAGNOSIS — I51.0 ACQUIRED CARDIAC SEPTAL DEFECT: ICD-10-CM

## 2025-03-10 DIAGNOSIS — N30.00 ACUTE CYSTITIS WITHOUT HEMATURIA: Primary | ICD-10-CM

## 2025-03-10 DIAGNOSIS — I51.7 CARDIOMEGALY: Primary | ICD-10-CM

## 2025-03-10 DIAGNOSIS — J84.114 ACUTE INTERSTITIAL PNEUMONITIS: ICD-10-CM

## 2025-03-10 LAB
BILIRUB SERPL-MCNC: NEGATIVE MG/DL
BILIRUB UR QL STRIP: NEGATIVE
BLOOD URINE, POC: NEGATIVE
CLARITY UR: CLEAR
CLARITY, POC UA: CLEAR
COLOR UR: YELLOW
COLOR, POC UA: YELLOW
GLUCOSE UR QL STRIP: NEGATIVE
GLUCOSE UR QL STRIP: NEGATIVE
HGB UR QL STRIP: NEGATIVE
KETONES UR QL STRIP: NEGATIVE
KETONES UR QL STRIP: NEGATIVE
LEUKOCYTE ESTERASE UR QL STRIP: ABNORMAL
LEUKOCYTE ESTERASE URINE, POC: NEGATIVE
MICROSCOPIC COMMENT: NORMAL
NITRITE UR QL STRIP: NEGATIVE
NITRITE, POC UA: NEGATIVE
PH UR STRIP: 6 [PH] (ref 5–8)
PH, POC UA: 6
POC RESIDUAL URINE VOLUME: 27 ML (ref 0–100)
PROT UR QL STRIP: NEGATIVE
PROTEIN, POC: NEGATIVE
RBC #/AREA URNS HPF: 0 /HPF (ref 0–4)
SP GR UR STRIP: 1.02 (ref 1–1.03)
SPECIFIC GRAVITY, POC UA: 1.02
SQUAMOUS #/AREA URNS HPF: 1 /HPF
URN SPEC COLLECT METH UR: ABNORMAL
UROBILINOGEN UR STRIP-ACNC: NEGATIVE EU/DL
UROBILINOGEN, POC UA: NORMAL
WBC #/AREA URNS HPF: 1 /HPF (ref 0–5)

## 2025-03-10 PROCEDURE — 87186 SC STD MICRODIL/AGAR DIL: CPT | Performed by: NURSE PRACTITIONER

## 2025-03-10 PROCEDURE — 51798 US URINE CAPACITY MEASURE: CPT | Mod: S$GLB,,, | Performed by: NURSE PRACTITIONER

## 2025-03-10 PROCEDURE — 87086 URINE CULTURE/COLONY COUNT: CPT | Performed by: NURSE PRACTITIONER

## 2025-03-10 PROCEDURE — 99203 OFFICE O/P NEW LOW 30 MIN: CPT | Mod: S$GLB,,, | Performed by: NURSE PRACTITIONER

## 2025-03-10 PROCEDURE — 99999 PR PBB SHADOW E&M-EST. PATIENT-LVL III: CPT | Mod: PBBFAC,,, | Performed by: NURSE PRACTITIONER

## 2025-03-10 PROCEDURE — 87088 URINE BACTERIA CULTURE: CPT | Performed by: NURSE PRACTITIONER

## 2025-03-10 PROCEDURE — 81002 URINALYSIS NONAUTO W/O SCOPE: CPT | Mod: S$GLB,,, | Performed by: NURSE PRACTITIONER

## 2025-03-10 NOTE — PROGRESS NOTES
"Subjective:      Adri Rice is a 79 y.o. female who presents for evaluation of frequency.      Presents today with her daughter who recently became caregiver.   Dx with dementia several months ago  Diagnosed with UTI and possible pyelonephritis- records not available  Reports completing antibiotics as directed with improvement in cognition. Daughter states that her mother had urinary frequency several weeks ago but is now resolved. Daughter believes urinary symptoms increase during periods of confusion/ anxiety. She also expresses concern over being able to notice signs of UTI due to dementia.   No known urologic surgeries or hx of renal stones.        The following portions of the patient's history were reviewed and updated as appropriate: allergies, current medications, past family history, past medical history, past social history, past surgical history and problem list.    Review of Systems  Dementia      Objective:   Vital Signs:BP (!) 147/75 (BP Location: Left arm, Patient Position: Sitting)   Pulse (!) 58   Ht 5' 3" (1.6 m)   Wt 68 kg (149 lb 14.6 oz)   LMP  (LMP Unknown)   BMI 26.56 kg/m²     Physical Exam   General: no acute distress  Head: normocephalic, atraumatic  Neck: supple, no lymphadenopathy, normal ROM, no masses  Skin: normal coloration and turgor, no rashes, no suspicious skin lesions noted    Psych: non-anxious    Lab Review   Urinalysis demonstrates negative for all components  Lab Results   Component Value Date    WBC 5.18 05/25/2021    HGB 14.4 08/24/2022    HGB 12.8 05/25/2021    HCT 40.6 05/25/2021    MCV 96 05/25/2021     05/25/2021     Lab Results   Component Value Date    CREATININE 0.71 02/02/2025    CREATININE 0.75 12/22/2021    BUN 10.0 02/02/2025    BUN 18 12/22/2021           Bladder Scan PVR: 27 cc      Assessment and Plan:   1. Frequency of micturition  --now resolved- will send UC today   --patients daughter wishes to defer medication therapy at this time " due to potential SE- in agreement   --aware of signs and symptoms of UTI in elderly population with dementia       2. Acute cystitis without hematuria  - Urinalysis  - Urine Culture High Risk  - Urine Culture High Risk; Standing     --UTI tip sheet provided  --standing UC order in place  --ED precautions given     This note is dictated on M*Modal word recognition program.  There are word recognition mistakes that are occasionally missed on review.

## 2025-03-10 NOTE — PROGRESS NOTES
ENDOCRINOLOGY NEW PATIENT VISIT: 03/11/2025    The patient location is: Home   The chief complaint leading to consultation is: Hypothyroid    Visit type: audiovisual    Face to Face time with patient: 42 minutes  50 minutes of total time spent on the encounter, which includes face to face time and non-face to face time preparing to see the patient (eg, review of tests), Obtaining and/or reviewing separately obtained history, Documenting clinical information in the electronic or other health record, Independently interpreting results (not separately reported) and communicating results to the patient/family/caregiver, or Care coordination (not separately reported).     Each patient to whom he or she provides medical services by telemedicine is:  (1) informed of the relationship between the physician and patient and the respective role of any other health care provider with respect to management of the patient; and (2) notified that he or she may decline to receive medical services by telemedicine and may withdraw from such care at any time.      Subjective:      Patient ID: Adri Rice is a 79 y.o. female.    Chief Complaint:  Consult and Thyroid Nodule    History of Present Illness  Adri Rice presents for evaluation of hypothyroidism.  Seen on camera with her daughter helping today for the visit.  Had been seen by Dr. Lino with outside endocrine group prior.  Last visit was around a year ago she estimates.  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  Placed on levothyroxine after that.   Also with history of primary hyperparathyroidism with parathyroidectomy in 2006 in [place].  She is on Alendronate for osteoporosis management.      August 15th her daughter started taking care of her and helping her at home.  They do note some recent labs for Vitamin D on Feb 6th with levels of 22.6.  Had been on Vit D3 - 1000 IU dose up to those labs but no  change in dose since.  Recent thyroid testing around same timeframe with normal TSH.      Regarding Hypothyroidism:    - Duration of hypothyroidism:  Diagnosed after 1979  - Etiology: Subtotal Thyroidectomy  - Contributing Factors Status post partial thyroidectomy  - Current relevant medications: levothyroxine T4 (Synthroid) 75 mcg daily  - Weight based dosing ([weight in kg] x 1.6): 108 mcg  - Takes thyroid medications properly    Lab Results   Component Value Date     02/02/2025     09/05/2024    GLU 91 08/01/2024    GLU 91 05/15/2024         - Most recent thyroid imaging: None    - No overt symptoms of hypothyroidism.        Regarding Osteopenia/Osteoporosis:    - Most recent DEXA: 02/02/2024    FINDINGS:   Lumbar spine mean bone mineral density L1-L4:   Bone mineral density: 1.66 ( g/cm2)   T-score: 4.0     PRIOR BMD: 1.608 g/c,2   Left femoral neck mean bone mineral density:   Bone mineral density: 0.76 ( g/cm2)   T score: -2.0   Prior: 0.687 g/cm2     Right femoral neck mean bone mineral density:   Bone mineral density: 0.79 ( g/cm2)   T score: -1.8   Prior: 0.793 g/cm2     Findings:  The FRAX 10 year probability of insufficiency fracture is 1.8 % for hip fracture and 6.6 % for major osteoporotic fracture.   Osteoporosis    Lab Results   Component Value Date    [id number] 10 (L) 10/20/2014    CALCIUM 10.2 02/02/2025    CALCIUM 10.4 09/05/2024    CALCIUM 10.4 08/10/2024    PHOS 2.4 (L) 10/23/2014    PHOS 2.8 10/20/2014    ALKPHOS 121 08/01/2024    ALKPHOS 138 05/15/2024    ALKPHOS 174 (H) 01/10/2024       - Fracture history:  No fragility fracture history    - Current osteoporosis medication: alendronate (Fosamax) since 2022.      - Calcium intake:  None.   Eats fair amount of dairy products     - Vit D3 intake:  1000 IU per day     - Post-menopausal age:     - Pertinent factors:  No   Yes  [x]    []  Tobacco use (Quit in 1999)  [x]    []  Alcohol use  [x]    []  Current diarrhea or history of  malabsorption (Celiac disease)  []    [x]  Thyroid disease  [x]    []  Anemia  [x]    []  Kidney stones  []    [x]  Hyperparathyroidism  []    [x]  High risk medications include: Omeprazole  [x]    []  Recent falls    - Patient uses ambulatory devices: none  - Sense of balance: Fair    - No obvious dental procedures planned but soon will be evaluated with dentist.      ROS:   As above    Objective:     LMP  (LMP Unknown)   BP Readings from Last 3 Encounters:   03/10/25 (!) 147/75   01/23/24 122/60   01/23/23 124/60     Wt Readings from Last 1 Encounters:   03/10/25 1225 68 kg (149 lb 14.6 oz)     There is no height or weight on file to calculate BMI.    Physical Exam  Constitutional:       Appearance: Normal appearance.   Neurological:      Mental Status: She is alert.   Psychiatric:         Mood and Affect: Mood normal.         Behavior: Behavior normal.        Lab Review:   Lab Results   Component Value Date    HGBA1C 5.3 01/10/2024     Lab Results   Component Value Date    CHOL 166 12/22/2021    HDL 75 12/22/2021    LDLCALC 76 12/22/2021    TRIG 66 12/22/2021    CHOLHDL 2.2 12/22/2021     Lab Results   Component Value Date     02/02/2025    K 3.9 02/02/2025     12/22/2021    CO2 30 02/02/2025    GLU 91 08/01/2024    BUN 10.0 02/02/2025    CREATININE 0.71 02/02/2025    CALCIUM 10.2 02/02/2025    PROT 7.0 12/22/2021    ALBUMIN 4.0 02/02/2025    BILITOT 0.6 02/02/2025    ALKPHOS 121 08/01/2024    AST 26 02/02/2025    ALT 20 02/02/2025    ANIONGAP 6 (L) 02/02/2025    ESTGFRAFRICA 88 12/22/2021    EGFRNONAA 76 12/22/2021     Vit D, 25-Hydroxy   Date Value Ref Range Status   10/20/2014 10 (L) 30 - 96 ng/mL Final     Comment:     Vitamin D deficiency.........<10 ng/mL                              Vitamin D insufficiency......10-29 ng/mL       Vitamin D sufficiency........> or equal to 30 ng/mL  Vitamin D toxicity............>100 ng/mL         Assessment and Plan     Osteoporosis  Prior bone density met  criteria for osteoporosis, now on therapy with Fosamax since about 2022.  Will be due for repeat DXA in about 1 year to see if BMD is stable at that time.  As noted Vitamin D dose change suggested.  Fall precautions should continued.  Will also need to avoid iatrogenic hyperthyroidism with LT4 dosing.  Will soon have dental checkup as well.      Postprocedural hypothyroidism  Known history of toxic goiter with surgery in the late 1970's.  Then on LT4 since then.  TSH reviewed which was in the mid normal range recently with current Levothyroxine dose of 75 mcg.  No change needed at this time.  Planning to repeat labs in a few months.      Primary hyperparathyroidism  History of PHPT with parathyroidectomy in 2006 for hypercalcemia issues.  Recently calcium has been either high normal or mildly elevated.  No calcium supplements in use now.  Vitamin D would contribute to mild PTH elevation but not to calcium changes.  Can consider addition of Sensipar in the future if calcium becomes an issue.  Can also consider neck ultrasound to evaluate further at that time if any desire for surgery again.  Risk of hypoparathyroidism would be higher though.  For now since on Fosamax we will avoid PTH level as this can mildly elevate those levels.  Need to avoid thiazide diuretics if any hypertension issues come up.      Vitamin D deficiency  Recently Vitamin D of 22.  Now with plans to have her increase Vit D3 dose from 1000 to 2000 IU a day.  Will repeat labs in about 3 months.        RTC in 1 year with DXA needed around that time.        **Visit today included increased complexity associated with the care of the problems addressed and managing the longitudinal care of the patient due to the serious and/or complex managed problems      Santos Castrejon DO     Disclaimer: This note has been generated using voice-recognition software. There may be typographical errors that have been missed during proof-reading.

## 2025-03-11 ENCOUNTER — OFFICE VISIT (OUTPATIENT)
Facility: CLINIC | Age: 80
End: 2025-03-11
Payer: COMMERCIAL

## 2025-03-11 ENCOUNTER — RESULTS FOLLOW-UP (OUTPATIENT)
Dept: UROLOGY | Facility: CLINIC | Age: 80
End: 2025-03-11

## 2025-03-11 DIAGNOSIS — E89.0 POSTPROCEDURAL HYPOTHYROIDISM: ICD-10-CM

## 2025-03-11 DIAGNOSIS — G30.1 ALZHEIMER'S DISEASE WITH LATE ONSET: ICD-10-CM

## 2025-03-11 DIAGNOSIS — Z86.39 PERSONAL HISTORY OF OTHER ENDOCRINE, NUTRITIONAL AND METABOLIC DISEASE: ICD-10-CM

## 2025-03-11 DIAGNOSIS — F02.80 ALZHEIMER'S DISEASE WITH LATE ONSET: ICD-10-CM

## 2025-03-11 DIAGNOSIS — E21.0 PRIMARY HYPERPARATHYROIDISM: ICD-10-CM

## 2025-03-11 DIAGNOSIS — Z86.39 HISTORY OF HYPERPARATHYROIDISM: ICD-10-CM

## 2025-03-11 DIAGNOSIS — M81.0 OSTEOPOROSIS, UNSPECIFIED OSTEOPOROSIS TYPE, UNSPECIFIED PATHOLOGICAL FRACTURE PRESENCE: ICD-10-CM

## 2025-03-11 DIAGNOSIS — E55.9 VITAMIN D DEFICIENCY: Primary | ICD-10-CM

## 2025-03-11 DIAGNOSIS — F01.B11: Primary | ICD-10-CM

## 2025-03-11 PROCEDURE — 98002 SYNCH AUDIO-VIDEO NEW MOD 45: CPT | Mod: 95,,, | Performed by: STUDENT IN AN ORGANIZED HEALTH CARE EDUCATION/TRAINING PROGRAM

## 2025-03-11 PROCEDURE — G2211 COMPLEX E/M VISIT ADD ON: HCPCS | Mod: 95,,, | Performed by: STUDENT IN AN ORGANIZED HEALTH CARE EDUCATION/TRAINING PROGRAM

## 2025-03-11 RX ORDER — ALENDRONATE SODIUM 70 MG/1
70 TABLET ORAL
Qty: 12 TABLET | Refills: 3 | Status: SHIPPED | OUTPATIENT
Start: 2025-03-11 | End: 2026-03-11

## 2025-03-11 RX ORDER — LEVOTHYROXINE SODIUM 75 UG/1
75 TABLET ORAL DAILY
Qty: 90 TABLET | Refills: 3 | Status: SHIPPED | OUTPATIENT
Start: 2025-03-11

## 2025-03-11 NOTE — PATIENT INSTRUCTIONS
Thank you for meeting with me today.     As mentioned based on the recent vitamin D level I suggest increasing from 1000 IU to 2000 IU of Vitamin D3 a day.  The current Fosamax (alendronate) dose can continue once weekly for now.  The next bone density scan will be around a year from now when we have a repeat visit.      For the thyroid medicine the current dose is working well since the TSH lab was normal.  No change with that.  I do have a thyroid lab ordered for future monitoring in about 2-3 months.  The labs can be set up with home health in the future.      Continue to remain careful with day to day activities to avoid falls given the bone density does put you at risk for fractures.  Use handrails with stairs and avoid tripping hazards.  When seeing the dentist in the future be sure to keep them aware that you are on medicine for the bones in case they will need holding of the medicine for any major dental procedures in the future.      Be sure to follow up with neurology in the near future when referral is completed to see about the headache issues as this is a bit outside of the endocrine system for a cause.      I have included that info I had looked up about use of magnesium for help with dementia.  Neurology may have more input that might be more up to date as well.          Magnesium L-threonate for Dementia    Magnesium L-threonate (MgT) is a compound that has shown promise in improving cognitive function and reducing symptoms in patients with Alzheimer's disease, a common form of dementia. Here is some important information about MgT and its potential benefits:    Mechanism of Action:    MgT increases magnesium levels in the brain, which is crucial for synaptic plasticity and cognitive function. Elevated brain magnesium can enhance learning and memory by upregulating NMDA receptor subunits, particularly NR2B, which are essential for synaptic plasticity and memory formation.[1][2][3]    Benefits in  Alzheimer's Disease:    1. Reduction of Amyloid Plaques: MgT has been shown to reduce amyloid-beta (A?) plaques, which are characteristic of Alzheimer's disease. This reduction is associated with decreased expression of enzymes involved in A? production.[1][4]    2. Prevention of Synapse Loss: MgT treatment can prevent synaptic loss and improve synaptic density, which is critical for maintaining cognitive function.[1][2][5]    3. Improvement in Cognitive Function: Studies have demonstrated that MgT can improve memory and cognitive abilities in animal models of Alzheimer's disease and in human clinical trials.[1][2][6]    4. Reduction of Neuroinflammation: MgT reduces neuroinflammation by suppressing the expression of pro-inflammatory cytokines such as interleukin-1? (IL-1?) and tumor necrosis factor-alpha (TNF-?).[7][8]    Dosage and Administration:    The typical dosage of MgT used in studies is around 2 grams per day, which has been shown to effectively elevate brain magnesium levels and improve cognitive function.[6] However, it is important to consult with a healthcare provider for personalized dosage recommendations.    Safety and Side Effects:    MgT is generally well-tolerated, but some individuals may experience mild gastrointestinal discomfort. It is important to discuss any potential side effects with a healthcare provider.    Conclusion:    Magnesium L-threonate shows potential as a therapeutic option for improving cognitive function and reducing Alzheimer's disease pathology. Patients and caregivers should consult with healthcare providers to determine if MgT is appropriate for their treatment plan.    References:    1. Diana W, Alanis J, Kali Y, et al. Elevation of Brain Magnesium Prevents Synaptic Loss and Reverses Cognitive Deficits in Alzheimer's Disease Mouse Model. Molecular Brain. 2014;7:65.    2. Valarie CASTRO, Gibran LANGSTON, Ron F, Kali G. Regulation of Structural and Functional Synapse Density by L-Threonate  Through Modulation of Intraneuronal Magnesium Concentration. Neuropharmacology. 2016;108:426-39.    3. Grabiel Alexander PP, Arslan SINGH, et al. By Suppressing the Expression of Anterior Vhjcinq-Ttovdkdzx-6? and -1? and Inhibiting the Aggregation of ?-Amyloid Protein, Magnesium Ions Inhibit the Cognitive Decline of Amyloid Precursor Protein    References  Elevation of Brain Magnesium Prevents Synaptic Loss and Reverses Cognitive Deficits in Alzheimer's Disease Mouse Model. Diana W, Alanis J, Kali Y, et al. Molecular Brain. 2014;7:65. doi:10.1186/[id number]-i.  Regulation of Structural and Functional Synapse Density by L-Threonate Through Modulation of Intraneuronal Magnesium Concentration. Valarie Q, Gibran KEENEG, Ron F, Kali G. Neuropharmacology. 2016;108:426-39. doi:10.1016/j.neuropharm.2016.05.006.  Targeting the NMDA Receptor Subunit NR2B for Treating or Preventing Age-Related Memory Decline. Danny D, Tyler SA, Tiffany HERNANDEZ. Expert Opinion on Therapeutic Targets. 2014;18(10):1121-30. doi:10.1517/[id number].  By Suppressing the Expression of Anterior Zuiooau-Hinnzlywn-0? and -1? and Inhibiting the Aggregation of ?-Amyloid Protein, Magnesium Ions Inhibit the Cognitive Decline of Amyloid Precursor Protein/Presenilin 1 Transgenic Mice. Grabiel Alexander PP, Arslan SINGH, et al. FASEB Journal : Official Publication of the Federation of American Societies for Experimental Biology. 2015;29(12):5044-58. doi:10.1096/fj.15-437637.  Magnesium Boosts the Memory Restorative Effect of Environmental Enrichment in Alzheimer's Disease Mice. Rigoberto Y, Rigoberto LOUIS, Cruz BEST, et al. CNS Neuroscience & Therapeutics. 2018;24(1):70-79. doi:10.1111/cns.88475.  A Magtein, Magnesium L-Threonate, -Based Formula Improves Brain Cognitive Functions in Healthy Chinese Adults. Cruz FLORES, Ganesh Q, Li S, et al. Nutrients. 2022;14(24):5235. doi:10.[facility]0/[email or web address].  Magnesium Ion Influx Reduces Neuroinflammation in A? Precursor Protein/Presenilin 1 Transgenic Mice by Suppressing the  Expression of Interleukin-1?. Danny P, Alanis LOUIS, Grabiel COLBERT, et al. Cellular & Molecular Immunology. 2017;14(5):451-464. doi:10.1038/cmi.2015.93.  Magnesium Ions Inhibit the Expression of Tumor Necrosis Factor ? and the Activity of ?-Secretase in a ?-Amyloid Protein-Dependent Mechanism in MERLINE/PS1 Transgenic Mice. Grabiel Alexander PP, Christen D, et al. Frontiers in Molecular Neuroscience. 2018;11:172. doi:10.3389/fnmol.2018.53573.

## 2025-03-12 NOTE — ASSESSMENT & PLAN NOTE
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  Then on LT4 since then.  TSH reviewed which was in the mid normal range recently with current Levothyroxine dose of 75 mcg.  No change needed at this time.  Planning to repeat labs in a few months.

## 2025-03-12 NOTE — ASSESSMENT & PLAN NOTE
Recently Vitamin D of 22.  Now with plans to have her increase Vit D3 dose from 1000 to 2000 IU a day.  Will repeat labs in about 3 months.

## 2025-03-12 NOTE — ASSESSMENT & PLAN NOTE
History of PHPT with parathyroidectomy in 2006 for hypercalcemia issues.  Recently calcium has been either high normal or mildly elevated.  No calcium supplements in use now.  Vitamin D would contribute to mild PTH elevation but not to calcium changes.  Can consider addition of Sensipar in the future if calcium becomes an issue.  Can also consider neck ultrasound to evaluate further at that time if any desire for surgery again.  Risk of hypoparathyroidism would be higher though.  For now since on Fosamax we will avoid PTH level as this can mildly elevate those levels.  Need to avoid thiazide diuretics if any hypertension issues come up.

## 2025-03-12 NOTE — ASSESSMENT & PLAN NOTE
Prior bone density met criteria for osteoporosis, now on therapy with Fosamax since about 2022.  Will be due for repeat DXA in about 1 year to see if BMD is stable at that time.  As noted Vitamin D dose change suggested.  Fall precautions should continued.  Will also need to avoid iatrogenic hyperthyroidism with LT4 dosing.  Will soon have dental checkup as well.

## 2025-03-13 ENCOUNTER — PATIENT MESSAGE (OUTPATIENT)
Dept: ORTHOPEDICS | Facility: CLINIC | Age: 80
End: 2025-03-13
Payer: COMMERCIAL

## 2025-03-13 DIAGNOSIS — G89.29 CHRONIC RIGHT SHOULDER PAIN: Primary | ICD-10-CM

## 2025-03-13 DIAGNOSIS — M25.511 CHRONIC RIGHT SHOULDER PAIN: Primary | ICD-10-CM

## 2025-03-14 ENCOUNTER — OFFICE VISIT (OUTPATIENT)
Dept: PULMONOLOGY | Facility: CLINIC | Age: 80
End: 2025-03-14
Payer: COMMERCIAL

## 2025-03-14 VITALS
DIASTOLIC BLOOD PRESSURE: 76 MMHG | WEIGHT: 148.94 LBS | BODY MASS INDEX: 26.38 KG/M2 | OXYGEN SATURATION: 97 % | SYSTOLIC BLOOD PRESSURE: 167 MMHG | HEART RATE: 51 BPM

## 2025-03-14 DIAGNOSIS — J45.40 MODERATE PERSISTENT ASTHMA, UNCOMPLICATED: Primary | ICD-10-CM

## 2025-03-14 DIAGNOSIS — N30.00 ACUTE CYSTITIS WITHOUT HEMATURIA: Primary | ICD-10-CM

## 2025-03-14 LAB — BACTERIA UR CULT: ABNORMAL

## 2025-03-14 PROCEDURE — 99999 PR PBB SHADOW E&M-EST. PATIENT-LVL IV: CPT | Mod: PBBFAC,,,

## 2025-03-14 PROCEDURE — 99205 OFFICE O/P NEW HI 60 MIN: CPT | Mod: S$GLB,,,

## 2025-03-14 RX ORDER — FLUTICASONE FUROATE, UMECLIDINIUM BROMIDE AND VILANTEROL TRIFENATATE 200; 62.5; 25 UG/1; UG/1; UG/1
1 POWDER RESPIRATORY (INHALATION) DAILY
Qty: 60 EACH | Refills: 11 | Status: SHIPPED | OUTPATIENT
Start: 2025-03-14

## 2025-03-14 RX ORDER — MEMANTINE HYDROCHLORIDE 10 MG/1
10 TABLET ORAL
COMMUNITY

## 2025-03-14 RX ORDER — CEFDINIR 300 MG/1
300 CAPSULE ORAL 2 TIMES DAILY
Qty: 14 CAPSULE | Refills: 0 | Status: SHIPPED | OUTPATIENT
Start: 2025-03-14 | End: 2025-03-21

## 2025-03-14 RX ORDER — SERTRALINE HYDROCHLORIDE 50 MG/1
100 TABLET, FILM COATED ORAL
COMMUNITY

## 2025-03-14 NOTE — PATIENT INSTRUCTIONS
-Obtain PFT to evaluate for obstructive or restrictive pattern  -Obtain IgE and CBC to check inflammatory markers    Inhaler Regimen:   -Trelegy for maintenance therapy. Rinse mouth after each use.   -Albuterol for rescue therapy. 1-2 puffs as needed for shortness of breath or wheezing    RTC in 4 weeks

## 2025-03-14 NOTE — PROGRESS NOTES
History and Physical Note  [facility]    Subjective:     Reason for visit: Asthma    Patient ID:  Adripennie Rice is a 79 y.o. female    Interval History 2025:  Patient presents with her daughter April that provides additional history. She was previously being followed by Pulm Department at [facility] for asthma. Asthma diagnosed in . Last PFT showing evidence of obstruction with bronchodilator response upon review. She reports a worsening cough. She was previously taking flovent but has stopped since 2024. She developed thrush from use that lasted 3-4 months and was reporting symptoms of dizziness when using.     Cough: two weeks of coughing  Sputum: clear   MMRC grade level: 4  Respiratory illness: none in the last 6 months  Last ED/UC visit: none in the last 6 months   Exercise: limited now   Immunization: not up to date  Current COPD treatment plan: albuterol just as needed     Her daughter April reports post prandial cough especially with chips and dinner. Cough happens in the middle of the night and worse upon waking up.     She has persistent sinus drainage and allergic rhinitis. Recurrent coughing that last for about 10 minutes. Abx for sinus infection recently and no cough during that time.     She was previously on a PPI for gastric reflux.     Additional Pulmonary History:  Environmental Exposures: Heat, pollen, smoke   Exposure to Animals/Pets: none  History of exposures to TB: none  Family History of Lung Cancer: none  Childhood Illnesses:  none  Tobacco/Smokin; no heavy smoking history   Tobacco Use History[1]    Objective:     Vitals:    25 1358   BP: (!) 167/76   BP Location: Left arm   Patient Position: Sitting   Pulse: (!) 51   SpO2: 97%   Weight: 67.5 kg (148 lb 14.7 oz)         Physical Exam  Constitutional:       Appearance: Normal appearance. She is well-developed.   HENT:      Head: Normocephalic.   Cardiovascular:      Rate and Rhythm: Normal rate.       Pulses: Normal pulses.      Heart sounds: Normal heart sounds, S1 normal and S2 normal.   Pulmonary:      Effort: Pulmonary effort is normal.      Breath sounds: Normal breath sounds. No decreased breath sounds.   Musculoskeletal:      Right lower leg: No edema.      Left lower leg: No edema.   Skin:     General: Skin is warm.      Capillary Refill: Capillary refill takes less than 2 seconds.      Findings: No rash.      Nails: There is no clubbing.   Neurological:      Mental Status: She is alert and oriented to person, place, and time. Mental status is at baseline.   Psychiatric:         Attention and Perception: Attention normal.         Mood and Affect: Mood and affect normal.         Speech: Speech normal.         Behavior: Behavior is cooperative.          Personal Diagnostic Review and Interpretation  03/17/2025 CT chest  No high risk nodules   No emphysema  No pneumonia       Pertinent Studies Reviewed & Interpreted:     Pulmonary Function Tests:   pending    Other Pertinent Laboratories:  Lab Results   Component Value Date    WBC 5.18 05/25/2021    RBC 4.21 05/25/2021    HGB 14.4 08/24/2022    MCV 96 05/25/2021    MCH 30.4 05/25/2021    MCHC 31.5 (L) 05/25/2021    RDW 15.0 (H) 05/25/2021     05/25/2021    MPV 11.3 05/25/2021    GRAN 3.0 05/25/2021    GRAN 57.5 05/25/2021    LYMPH 1.3 05/25/2021    LYMPH 24.3 05/25/2021    MONO 0.6 05/25/2021    MONO 10.6 05/25/2021    EOS 0.3 05/25/2021    BASO 0.04 05/25/2021         Assessment & Plan:       Plan:  Clinical impression is resonably certain and repeated evaluation prn +/- follow up will be needed as below.      1. Moderate persistent asthma, uncomplicated  -     Ambulatory referral/consult to Pulmonology  -     TSH; Future; Expected date: 03/14/2025  -     COMPREHENSIVE METABOLIC PANEL; Future; Expected date: 03/14/2025  -     CBC auto differential; Future; Expected date: 03/14/2025  -     IGE; Future; Expected date: 03/14/2025  -     Complete PFT  with bronchodilator; Future  -     fluticasone-umeclidin-vilanter (TRELEGY ELLIPTA) 200-62.5-25 mcg inhaler; Inhale 1 puff into the lungs once daily.  Dispense: 60 each; Refill: 11        Follow up in about 4 weeks (around 2025), or if symptoms worsen or fail to improve.    Discussed with patient above for education the following:      Patient Instructions   -Obtain PFT to evaluate for obstructive or restrictive pattern  -Obtain IgE and CBC to check inflammatory markers    Inhaler Regimen:   -Trelegy for maintenance therapy. Rinse mouth after each use.   -Albuterol for rescue therapy. 1-2 puffs as needed for shortness of breath or wheezing    RTC in 4 weeks    RETURN TO CLINIC IN 1 MONTHS     Total professional time spent for the encounter: 60 minutes  Time was spent preparing to see the patient, reviewing results of prior testing, obtaining and/or reviewing separately obtained history, performing a medically appropriate examination and interview, counseling and educating the patient/family, ordering medications/tests/procedures, referring and communicating with other health care professionals, documenting clinical information in the electronic health record, and independently interpreting results.    Gini Tapia DNP       [1]   Social History  Tobacco Use   Smoking Status Former    Current packs/day: 0.00    Types: Cigarettes    Quit date: 1999    Years since quittin.6   Smokeless Tobacco Never

## 2025-03-17 ENCOUNTER — HOSPITAL ENCOUNTER (OUTPATIENT)
Dept: RADIOLOGY | Facility: OTHER | Age: 80
Discharge: HOME | End: 2025-03-17
Attending: NURSE PRACTITIONER
Payer: COMMERCIAL

## 2025-03-17 DIAGNOSIS — I51.7 CARDIOMEGALY: ICD-10-CM

## 2025-03-17 DIAGNOSIS — J84.114 ACUTE INTERSTITIAL PNEUMONITIS: ICD-10-CM

## 2025-03-17 DIAGNOSIS — J81.0 POSTOPERATIVE PULMONARY EDEMA: ICD-10-CM

## 2025-03-17 PROCEDURE — 71260 CT THORAX DX C+: CPT | Mod: TC

## 2025-03-17 PROCEDURE — 25500020 PHARM REV CODE 255: Performed by: NURSE PRACTITIONER

## 2025-03-17 RX ADMIN — IOHEXOL 100 ML: 350 INJECTION, SOLUTION INTRAVENOUS at 01:03

## 2025-03-21 DIAGNOSIS — M75.101 UNSP ROTATR-CUFF TEAR/RUPTR OF RIGHT SHOULDER, NOT TRAUMA: Primary | ICD-10-CM

## 2025-03-24 ENCOUNTER — HOSPITAL ENCOUNTER (EMERGENCY)
Facility: OTHER | Age: 80
Discharge: HOME | End: 2025-03-24
Attending: EMERGENCY MEDICINE
Payer: COMMERCIAL

## 2025-03-24 ENCOUNTER — OFFICE VISIT (OUTPATIENT)
Dept: PAIN MEDICINE | Facility: CLINIC | Age: 80
End: 2025-03-24
Attending: ANESTHESIOLOGY
Payer: COMMERCIAL

## 2025-03-24 VITALS
WEIGHT: 146 LBS | BODY MASS INDEX: 25.87 KG/M2 | RESPIRATION RATE: 18 BRPM | OXYGEN SATURATION: 100 % | HEIGHT: 63 IN | DIASTOLIC BLOOD PRESSURE: 84 MMHG | HEART RATE: 57 BPM | TEMPERATURE: 98 F | SYSTOLIC BLOOD PRESSURE: 173 MMHG

## 2025-03-24 VITALS
HEIGHT: 63 IN | RESPIRATION RATE: 19 BRPM | HEART RATE: 52 BPM | SYSTOLIC BLOOD PRESSURE: 168 MMHG | DIASTOLIC BLOOD PRESSURE: 65 MMHG | BODY MASS INDEX: 26.01 KG/M2 | OXYGEN SATURATION: 100 % | WEIGHT: 146.81 LBS

## 2025-03-24 DIAGNOSIS — R29.818 ACUTE FOCAL NEUROLOGICAL DEFICIT: ICD-10-CM

## 2025-03-24 DIAGNOSIS — G89.29 OTHER CHRONIC PAIN: ICD-10-CM

## 2025-03-24 DIAGNOSIS — G62.0 DRUG-INDUCED POLYNEUROPATHY: ICD-10-CM

## 2025-03-24 DIAGNOSIS — R42 DIZZINESS: Primary | ICD-10-CM

## 2025-03-24 DIAGNOSIS — L40.50 ARTHROPATHIC PSORIASIS, UNSPECIFIED: ICD-10-CM

## 2025-03-24 DIAGNOSIS — I10 UNCONTROLLED HYPERTENSION: ICD-10-CM

## 2025-03-24 LAB
ABSOLUTE EOSINOPHIL (OHS): 0.3 K/UL
ABSOLUTE MONOCYTE (OHS): 0.51 K/UL (ref 0.3–1)
ABSOLUTE NEUTROPHIL COUNT (OHS): 2.12 K/UL (ref 1.8–7.7)
ALBUMIN SERPL BCP-MCNC: 4 G/DL (ref 3.5–5.2)
ALP SERPL-CCNC: 102 UNIT/L (ref 40–150)
ALT SERPL W/O P-5'-P-CCNC: 20 UNIT/L (ref 10–44)
ANION GAP (OHS): 10 MMOL/L (ref 8–16)
AST SERPL-CCNC: 30 UNIT/L (ref 11–45)
BASOPHILS # BLD AUTO: 0.02 K/UL
BASOPHILS NFR BLD AUTO: 0.5 %
BILIRUB SERPL-MCNC: 0.5 MG/DL (ref 0.1–1)
BUN SERPL-MCNC: 15 MG/DL (ref 8–23)
CALCIUM SERPL-MCNC: 10.2 MG/DL (ref 8.7–10.5)
CHLORIDE SERPL-SCNC: 105 MMOL/L (ref 95–110)
CHOLEST SERPL-MCNC: 256 MG/DL (ref 120–199)
CHOLEST/HDLC SERPL: 3.2 {RATIO} (ref 2–5)
CO2 SERPL-SCNC: 25 MMOL/L (ref 23–29)
CREAT SERPL-MCNC: 0.8 MG/DL (ref 0.5–1.4)
CREAT SERPL-MCNC: 0.9 MG/DL (ref 0.5–1.4)
ERYTHROCYTE [DISTWIDTH] IN BLOOD BY AUTOMATED COUNT: 15 % (ref 11.5–14.5)
GFR SERPLBLD CREATININE-BSD FMLA CKD-EPI: >60 ML/MIN/1.73/M2
GLUCOSE SERPL-MCNC: 83 MG/DL (ref 70–110)
HCT VFR BLD AUTO: 41.4 % (ref 37–48.5)
HDLC SERPL-MCNC: 81 MG/DL (ref 40–75)
HDLC SERPL: 31.6 % (ref 20–50)
HGB BLD-MCNC: 13.4 GM/DL (ref 12–16)
IMM GRANULOCYTES # BLD AUTO: 0.01 K/UL (ref 0–0.04)
IMM GRANULOCYTES NFR BLD AUTO: 0.3 % (ref 0–0.5)
INR PPP: 1 (ref 0.8–1.2)
LDLC SERPL CALC-MCNC: 164.2 MG/DL (ref 63–159)
LYMPHOCYTES # BLD AUTO: 0.96 K/UL (ref 1–4.8)
MCH RBC QN AUTO: 30.7 PG (ref 27–50)
MCHC RBC AUTO-ENTMCNC: 32.4 G/DL (ref 32–36)
MCV RBC AUTO: 95 FL (ref 82–98)
NONHDLC SERPL-MCNC: 175 MG/DL
NUCLEATED RBC (/100WBC) (OHS): 0 /100 WBC
OHS QRS DURATION: 96 MS
OHS QTC CALCULATION: 402 MS
PLATELET # BLD AUTO: 157 K/UL (ref 150–450)
PMV BLD AUTO: 10.9 FL (ref 9.2–12.9)
POC PTINR: 1.2 (ref 0.9–1.2)
POCT GLUCOSE: 94 MG/DL (ref 70–110)
POTASSIUM SERPL-SCNC: 4.8 MMOL/L (ref 3.5–5.1)
PROT SERPL-MCNC: 7.3 GM/DL (ref 6–8.4)
PROTHROMBIN TIME: 11.1 SECONDS (ref 9–12.5)
RBC # BLD AUTO: 4.37 M/UL (ref 4–5.4)
RELATIVE EOSINOPHIL (OHS): 7.7 %
RELATIVE LYMPHOCYTE (OHS): 24.5 % (ref 18–48)
RELATIVE MONOCYTE (OHS): 13 % (ref 4–15)
RELATIVE NEUTROPHIL (OHS): 54 % (ref 38–73)
SAMPLE: NORMAL
SAMPLE: NORMAL
SODIUM SERPL-SCNC: 140 MMOL/L (ref 136–145)
T4 FREE SERPL-MCNC: NORMAL NG/DL
TRIGL SERPL-MCNC: 54 MG/DL (ref 30–150)
TSH SERPL-ACNC: 2.18 UIU/ML (ref 0.4–4)
WBC # BLD AUTO: 3.92 K/UL (ref 3.9–12.7)

## 2025-03-24 PROCEDURE — 99999 PR PBB SHADOW E&M-EST. PATIENT-LVL IV: CPT | Mod: PBBFAC,,, | Performed by: ANESTHESIOLOGY

## 2025-03-24 PROCEDURE — 82962 GLUCOSE BLOOD TEST: CPT

## 2025-03-24 PROCEDURE — 25500020 PHARM REV CODE 255: Performed by: EMERGENCY MEDICINE

## 2025-03-24 PROCEDURE — 99285 EMERGENCY DEPT VISIT HI MDM: CPT | Mod: 25

## 2025-03-24 PROCEDURE — 80053 COMPREHEN METABOLIC PANEL: CPT | Performed by: EMERGENCY MEDICINE

## 2025-03-24 PROCEDURE — 99900035 HC TECH TIME PER 15 MIN (STAT)

## 2025-03-24 PROCEDURE — 82565 ASSAY OF CREATININE: CPT

## 2025-03-24 PROCEDURE — 99499 UNLISTED E&M SERVICE: CPT | Mod: S$GLB,,, | Performed by: ANESTHESIOLOGY

## 2025-03-24 PROCEDURE — 82803 BLOOD GASES ANY COMBINATION: CPT

## 2025-03-24 PROCEDURE — 80061 LIPID PANEL: CPT | Performed by: EMERGENCY MEDICINE

## 2025-03-24 PROCEDURE — 85610 PROTHROMBIN TIME: CPT | Performed by: EMERGENCY MEDICINE

## 2025-03-24 PROCEDURE — 85610 PROTHROMBIN TIME: CPT

## 2025-03-24 PROCEDURE — 93010 ELECTROCARDIOGRAM REPORT: CPT | Mod: ,,, | Performed by: INTERNAL MEDICINE

## 2025-03-24 PROCEDURE — 93005 ELECTROCARDIOGRAM TRACING: CPT

## 2025-03-24 PROCEDURE — 84443 ASSAY THYROID STIM HORMONE: CPT | Performed by: EMERGENCY MEDICINE

## 2025-03-24 PROCEDURE — 85025 COMPLETE CBC W/AUTO DIFF WBC: CPT | Performed by: EMERGENCY MEDICINE

## 2025-03-24 RX ADMIN — IOHEXOL 100 ML: 350 INJECTION, SOLUTION INTRAVENOUS at 10:03

## 2025-03-24 NOTE — SUBJECTIVE & OBJECTIVE
"HPI:  79 y.o. female w/ PMHx of HTN, Breast Ca, HLD, chronic pain who presents w/ reported lightheadedness, visual deficits and headache.   This happened while she was at the pain management office.   Reported lightheadedness, tension in the forehead and "eyes feeling lightheaded".   Previously had complained of episodes of lightheadedness but never reported "eyes feeling lightheaded" as per daughter. This was the first time.   Patient w/ recent diagnosis of vascular dementia and is a poor historian.     Images personally reviewed and interpreted:  Study: Head CT and CTA Head & Neck  Study Interpretation: No acute intracranial abnormalities, specifically no early signs of ischemia and no intracranial hemorrhage.   No LVO, no hemodynamically significant stenosis.           Assessment and plan:  NIHSS 1 due to underlying dementia.   Not a TNK candidate due to no focal neurologic deficits.   No LVO.   Suspect symptoms related to hypertensive emergency.   Further care as per ED.     Lytics recommendation: Thrombolytic therapy not recommended due to Suspected stroke mimic  and Mild Non-Disabling Symptoms    Thrombectomy recommendation: No; No large vessel occlusion identified on imaging   Placement recommendation: pending further studies             "

## 2025-03-24 NOTE — DISCHARGE INSTRUCTIONS
It was a pleasure taking care of you both today.  It is important that you keep blood pressure journal now that you were starting the new blood pressure medication prescribed by the nurse practitioner.    Your CTA scan today did not show any evidence of a new stroke or bleed.  Continue your care with your neurologist for the dementia.    Return to the emergency department with any worsening symptoms or concerns.  Thank you for allowing us to take care of you today.

## 2025-03-24 NOTE — PROGRESS NOTES
"PATIENT HAD ELEVATED BP DURING VISIT IN [facility]IN [facility]. ALONG WITH FEELING "LIGHTHEADED" AND VISION COMPLAINTS. TAKEN TO Emergency Department VIA WHEELCHAIR, ACCOMPANIED BY DAUGHTER, APRIL. CARE ASSUMED BY   Emergency Department STAFF. ENCOUNTER ENDED.  "

## 2025-03-24 NOTE — PROGRESS NOTES
Chronic Pain - New Consult    PCP: Brenda Jean NP    REFERRING PHYSICIAN: Brenda Jean NP    CHIEF COMPLAINT: pain everywhere.     Original HISTORY OF PRESENT ILLNESS: Adri Rice presents to the clinic for the evaluation of the above pain. The pain started years ago. Pt was diagnosed with Alzheimer disease in December 2024. Pt has been diagnosed in the past with spondylitis, cervical disc bulge, and fibromyalgia.     Original Pain Description:  The pain is located in the R shoulder, R hand, and lower back pain and is radiating to the R leg . The pain in her R shoulder is described as burning and soreness . Pt denies radiation from her neck. Pt's daughter and caregiver reports that patient reports tingling in her R fingertips at times. Exacerbating factors: pt states it changes and depends on the day. Caregiver/daughter states from the minute patient wakes up, she begins complaining of her shoulder pain. Mitigating factors heat, ice, and NSAIDs. Symptoms interfere with sleeping. The patient feels like symptoms have been unchanged.     At this point in the visit, pt stopped answering questions and stating she was lightheaded repeatedly, as well as having headache and vision changes. Pt had taken her Amlodipine this AM prior to the visit. Daughter/caregiver reports the patient has been complaining of headaches for the past week, but was not sure if patient was describing her pain correctly because she has Alzheimers. Patient appeared uncomfortable and was unable to respond to questions or interact with us. Evaluation was aborted at that point.         3/24/2025     9:45 AM   Last 3 PDI Scores   Pain Disability Index (PDI) 38       INTERVAL HISTORY (Date):        (Newest visit at the top)       Conservative therapy:  PT: Yes, last session at [facility] March-April 2024 (did not offer her relief)  Chiro:  HEP in the past 6 months: patient preforming daily HEP with limited benefit       Treatments  / Medications: (Ice/Heat/NSAIDS/APAP/etc):  -Alternates between Aleve and Tylenol - gives pt some relief  -Heat and Ice - pt gets confused and does not like these methods      Interventional Pain Procedures: (Previous injections)  DID NOT COMPLETE.         IMAGING:          Past Medical History:   Diagnosis Date    Asthma     Breast cancer     Fibromyalgia     Hypertension     Psoriasis vulgaris      Past Surgical History:   Procedure Laterality Date    APPENDECTOMY  1962    BREAST LUMPECTOMY  7-    breast cancer    BUNIONECTOMY  2010    CORONARY ANGIOGRAPHY  2014    no obstructive CAD    EPIDURAL STEROID INJECTION N/A 8/11/2020    Procedure: LUMBAR L2/3 NANCY DIRECT REFERRAL;  Surgeon: Ion Bearden MD;  Location: [facility];  Service: Pain Management;  Laterality: N/A;  NEEDS CONSENT    KNEE ARTHROSCOPY Right 2004    SHOULDER SURGERY  1982    right/had a bone infectioni    THYROIDECTOMY  1977    hyperparathyroid&thyroidectomy 2006    TOTAL KNEE ARTHROPLASTY  10/13/2014     Social History[1]  Family History   Problem Relation Name Age of Onset    Heart disease Mother      Hypertension Mother      Heart disease Father      Hypertension Father      Diabetes Brother      Hypertension Brother      Heart disease Brother      Kidney disease Brother      Skin cancer Neg Hx      Psoriasis Neg Hx         Review of patient's allergies indicates:   Allergen Reactions    Atorvastatin Other (See Comments)    Rosuvastatin Other (See Comments)     Felt vibration/myalgia     Sulfamethoxazole-trimethoprim Swelling, Diarrhea and Other (See Comments)       Current Outpatient Medications   Medication Sig    albuterol (ACCUNEB) 0.63 mg/3 mL Nebu Take 0.63 mg by nebulization every 6 (six) hours as needed.    alendronate (FOSAMAX) 70 MG tablet Take 1 tablet (70 mg total) by mouth every 7 days.    amLODIPine (NORVASC) 5 MG tablet Take 5 mg by mouth once daily.     calcipotriene (DOVONOX) 0.005 % cream Apply topically 2 (two)  "times daily as needed.    fluticasone propionate (FLOVENT HFA) 220 mcg/actuation inhaler Take 2 puffs by mouth 2 (two) times daily.    fluticasone-umeclidin-vilanter (TRELEGY ELLIPTA) 200-62.5-25 mcg inhaler Inhale 1 puff into the lungs once daily.    levothyroxine (SYNTHROID) 75 MCG tablet Take 1 tablet (75 mcg total) by mouth once daily.    memantine (NAMENDA) 10 MG Tab Take 10 mg by mouth.    omeprazole (PRILOSEC) 20 MG capsule     pravastatin (PRAVACHOL) 40 MG tablet TAKE 1 TABLET BY MOUTH EVERY DAY    sertraline (ZOLOFT) 50 MG tablet Take 100 mg by mouth.    vitamin D (VITAMIN D3) 1000 units Tab Take 1,000 Units by mouth.     No current facility-administered medications for this visit.           ROS:  DID NOT COMPLETE     VITALS:   Vitals:    03/24/25 0942   BP: (!) 168/65   Pulse: (!) 52   Resp: 19   SpO2: 100%   Weight: 66.6 kg (146 lb 13.2 oz)   Height: 5' 3" (1.6 m)   PainSc:   2   PainLoc: Shoulder         PHYSICAL EXAM:   DID NOT COMPLETE            ASSESSMENT: 79 y.o. year old with R shoulder pain and low back pain, consistent with:    1. Drug-induced polyneuropathy  Ambulatory referral/consult to Pain Clinic      2. Other chronic pain  Ambulatory referral/consult to Pain Clinic      3. Arthropathic psoriasis, unspecified  Ambulatory referral/consult to Pain Clinic                PLAN:    We were unable to complete this visit. During this evaluation, pt had elevated BP upon arrival, 168/65, after multiple checks. During the visit, pt reported dizziness, headaches, and some vision changes to a degree that interfered with our ability to conduct a history, physical exam, and interact with the pt. Daughter was concerned and wanted to postpone our evaluation. Through shared decision making, pt was referred to the ER for further evaluation.     Pt will be scheduled for new initial visit with us in 2 weeks for evaluation.       I would like to thank Brenda Jean NP for the opportunity to assist in the " care of this patient. We had a very nice visit and I look forward to continuing their care. Please let me know if I can be of further assistance.     Luiz Andrade   I have personally reviewed the history and exam of this patient and agree with the resident/fellow/NPs note as stated above.    Aleksey Farfan MD    2025         [1]   Social History  Socioeconomic History    Marital status:    Tobacco Use    Smoking status: Former     Current packs/day: 0.00     Types: Cigarettes     Quit date: 1999     Years since quittin.6    Smokeless tobacco: Never   Substance and Sexual Activity    Alcohol use: No    Drug use: No    Sexual activity: Never     Social Drivers of Health     Financial Resource Strain: High Risk (3/5/2025)    Overall Financial Resource Strain (CARDIA)     Difficulty of Paying Living Expenses: Hard   Food Insecurity: No Food Insecurity (3/5/2025)    Hunger Vital Sign     Worried About Running Out of Food in the Last Year: Never true     Ran Out of Food in the Last Year: Never true   Transportation Needs: No Transportation Needs (3/5/2025)    PRAPARE - Transportation     Lack of Transportation (Medical): No     Lack of Transportation (Non-Medical): No   Physical Activity: Inactive (3/5/2025)    Exercise Vital Sign     Days of Exercise per Week: 0 days     Minutes of Exercise per Session: 0 min   Stress: Stress Concern Present (3/5/2025)    [place] [employer] - Occupational Stress Questionnaire     Feeling of Stress : To some extent   Housing Stability: Low Risk  (3/5/2025)    Housing Stability Vital Sign     Unable to Pay for Housing in the Last Year: No     Number of Times Moved in the Last Year: 1     Homeless in the Last Year: No

## 2025-03-24 NOTE — ED PROVIDER NOTES
"Encounter Date: 3/24/2025       History     Chief Complaint   Patient presents with    Dizziness     Sent from pain clinic d/t dizziness and generalized headache onset suddenly, and HTN. /81 in triage. Pt also reporting "eye problem" but unable to elaborate. Also c/o chronic R shoulder pain. Hx of dementia, not a good historian. First pain mgmt appt today. LKN [id number] this am     Patient is an 79-year-old female with history of hypertension, fibromyalgia, asthma, dementia who presents to the emergency department with her daughter.  Daughter reports over the last 7 months her mother has made a drastic decline.  Reports she was diagnosed with dementia and she had to move here from [place] to take care of her.  Reports [facility]  Reports her PCP is through [place], and she has been seeing multiple specialists.  Reports today they were headed to see pain management for establishment of care for her chronic pain.  Reports while waiting in triage, her mother began to say she was having a weird sensation in her head - feeling dizzy - and vision problem.  Pt denies any pain now but reports "I still feel off."  Denies recent fevers or illness.  Denies chest pain or shortness of breath.  Reports she has been eating and drinking normally.  Performing all normal ADLs.    The history is provided by the patient and a relative.     Review of patient's allergies indicates:   Allergen Reactions    Atorvastatin Other (See Comments)    Rosuvastatin Other (See Comments)     Felt vibration/myalgia     Sulfamethoxazole-trimethoprim Swelling, Diarrhea and Other (See Comments)     Past Medical History:   Diagnosis Date    Asthma     Breast cancer     Fibromyalgia     Hypertension     Psoriasis vulgaris      Past Surgical History:   Procedure Laterality Date    APPENDECTOMY  1962    BREAST LUMPECTOMY  7-    breast cancer    BUNIONECTOMY  2010    CORONARY ANGIOGRAPHY  2014    no " obstructive CAD    EPIDURAL STEROID INJECTION N/A 8/11/2020    Procedure: LUMBAR L2/3 NANCY DIRECT REFERRAL;  Surgeon: Ion Bearden MD;  Location: [facility];  Service: Pain Management;  Laterality: N/A;  NEEDS CONSENT    KNEE ARTHROSCOPY Right 2004    SHOULDER SURGERY  1982    right/had a bone infectioni    THYROIDECTOMY  1977    hyperparathyroid&thyroidectomy 2006    TOTAL KNEE ARTHROPLASTY  10/13/2014     Family History   Problem Relation Name Age of Onset    Heart disease Mother      Hypertension Mother      Heart disease Father      Hypertension Father      Diabetes Brother      Hypertension Brother      Heart disease Brother      Kidney disease Brother      Skin cancer Neg Hx      Psoriasis Neg Hx       Social History[1]  Review of Systems   Constitutional:  Negative for activity change, appetite change, chills, fatigue and fever.   HENT:  Negative for congestion, ear discharge, ear pain, postnasal drip, rhinorrhea, sore throat and trouble swallowing.    Eyes:  Positive for visual disturbance.   Respiratory:  Negative for cough and shortness of breath.    Cardiovascular:  Negative for chest pain.   Gastrointestinal:  Negative for abdominal pain, blood in stool, constipation, diarrhea, nausea and vomiting.   Genitourinary:  Negative for dysuria, flank pain and hematuria.   Musculoskeletal:  Negative for back pain, neck pain and neck stiffness.   Skin:  Negative for rash and wound.   Neurological:  Positive for dizziness and headaches. Negative for light-headedness.       Physical Exam     Initial Vitals   BP Pulse Resp Temp SpO2   03/24/25 1029 03/24/25 1107 03/24/25 1029 03/24/25 1031 03/24/25 1029   (!) 205/81 (!) 51 (!) 54 98.1 °F (36.7 °C) 98 %      MAP       --                Physical Exam    Nursing note and vitals reviewed.  Constitutional: She appears well-developed and well-nourished. She is not diaphoretic.  Non-toxic appearance. No distress.   HENT:   Head: Normocephalic.   Right Ear: External  ear normal.   Left Ear: External ear normal.   Nose: Nose normal. Mouth/Throat: Oropharynx is clear and moist. No oropharyngeal exudate.   Eyes: Conjunctivae and EOM are normal. Pupils are equal, round, and reactive to light.   Neck:   Normal range of motion.  Cardiovascular:  Normal rate and regular rhythm.           No lower extremity edema   Pulmonary/Chest: Breath sounds normal.   Abdominal: Abdomen is soft. Bowel sounds are normal. There is no abdominal tenderness.   Musculoskeletal:         General: Normal range of motion.      Cervical back: Normal range of motion.     Neurological: She is alert. She has normal strength. No cranial nerve deficit or sensory deficit.   Not oriented to year but knows her name, where she is, and who her daughter is      Normal finger to nose, no pronator drift, no facial asymmetry    Skin: Skin is dry. Capillary refill takes less than 2 seconds.   Psychiatric: She has a normal mood and affect.         ED Course   Procedures  Labs Reviewed   LIPID PANEL - Abnormal       Result Value    Cholesterol Total 256 (*)     Triglyceride 54      HDL Cholesterol 81 (*)     LDL Cholesterol 164.2 (*)     HDL/Cholesterol Ratio 31.6      Cholesterol/HDL Ratio 3.2      Non HDL Cholesterol 175     CBC WITH DIFFERENTIAL - Abnormal    WBC 3.92      RBC 4.37      HGB 13.4      HCT 41.4      MCV 95      MCH 30.7      MCHC 32.4      RDW 15.0 (*)     Platelet Count 157      MPV 10.9      Nucleated RBC 0      Neut % 54.0      Lymph % 24.5      Mono % 13.0      Eos % 7.7      Basophil % 0.5      Imm Grans % 0.3      Neut # 2.12      Lymph # 0.96 (*)     Mono # 0.51      Eos # 0.30      Baso # 0.02      Imm Grans # 0.01     COMPREHENSIVE METABOLIC PANEL - Normal    Sodium 140      Potassium 4.8      Chloride 105      CO2 25      Glucose 83      BUN 15      Creatinine 0.8      Calcium 10.2      Protein Total 7.3      Albumin 4.0      Bilirubin Total 0.5            AST 30      ALT 20      Anion Gap 10       eGFR >60     PROTIME-INR - Normal    PT 11.1      INR 1.0     CBC W/ AUTO DIFFERENTIAL    Narrative:     The following orders were created for panel order CBC W/ AUTO DIFFERENTIAL.  Procedure                               Abnormality         Status                     ---------                               -----------         ------                     CBC with Differential[[id number]]       Abnormal            Final result                 Please view results for these tests on the individual orders.   TSH    TSH 2.184      Free T4       POCT GLUCOSE    POCT Glucose 94     ISTAT PROCEDURE    POC PTINR 1.2      Sample VENOUS     ISTAT CREATININE    POC Creatinine 0.9      Sample VENOUS       EKG Readings: (Independently Interpreted)   Initial Reading: No STEMI. Rhythm: Sinus Bradycardia.     ECG Results              ECG 12 lead (Final result)        Collection Time Result Time QRS Duration OHS QTC Calculation    03/24/25 11:05:27 03/24/25 16:15:45 96 402                     Final result by Interface, Lab In [facility] (03/24/25 16:15:51)                   Narrative:    Test Reason : R29.818,    Vent. Rate :  50 BPM     Atrial Rate :  50 BPM     P-R Int : 166 ms          QRS Dur :  96 ms      QT Int : 442 ms       P-R-T Axes : -13 -25  63 degrees    QTcB Int : 402 ms    Sinus bradycardia with sinus arrhythmia  Septal infarct ,age undetermined  Abnormal ECG    Confirmed by Sekou Tavera ([id number]) on 3/24/2025 4:15:42 PM    Referred By: AAAREFERRAL SELF           Confirmed By: Sekou Tavera                      Wet Read by Barak Escobar MD (03/24/25 11:16:01, [facility], Emergency Medicine)    My EKG interpretation, sinus bradycardia, 50 beats per minute, left axis deviation, poor R-wave progression, when compared to previous EKG August 24, 2022 relatively unchanged                                  Imaging Results              CTA Head and Neck (xpd) (Final result)  Result time 03/24/25 11:22:21       Final result by Rojelio Lynn MD (03/24/25 11:22:21)                   Impression:      No acute abnormality.    No high-grade stenosis or major vessel occlusion.      Electronically signed by: Rojelio Lynn  Date:    03/24/2025  Time:    11:22               Narrative:    EXAMINATION:  CTA HEAD AND NECK (XPD)    CLINICAL HISTORY:  Neuro deficit, acute, stroke suspected;    TECHNIQUE:  Non contrast low dose axial images were obtained through the head. CT angiogram was performed from the level of the brandi to the top of the head following the IV administration of 100mL of Omnipaque 350.   Sagittal and coronal reconstructions and maximum intensity projection reconstructions were performed. Arterial stenosis percentages are based on NASCET measurement criteria.    COMPARISON:  None    FINDINGS:  Intracranial Compartment:    Ventricles and sulci are normal in size for age without evidence of hydrocephalus. No extra-axial blood or fluid collections.    The brain parenchyma appears intact with mild involutional and chronic small vessel type changes in the deep and subcortical white matter..  No parenchymal mass, hemorrhage, edema, or major vascular distribution infarct.    Skull/Extracranial Contents (limited evaluation): No fracture. Mastoid air cells and paranasal sinuses are essentially clear.    Non-Vascular Structures of the Neck/Thoracic Inlet (limited evaluation): Surgical changes in the base of the neck on the left suggest thyroidectomy.  Multilevel cervical spondylosis and central and foraminal canal stenosis.  No fracture or subluxation.    Aorta: Normal 3 vessel arch.    Extracranial carotid circulation: No hemodynamically significant stenosis, aneurysmal dilatation, or dissection.    Extracranial vertebral circulation: No hemodynamically significant stenosis, aneurysmal dilatation, or dissection.    Intracranial Arteries: Mild calcified plaque within the carotid siphons.  No focal high-grade  "stenosis, occlusion, or aneurysm.    Venous structures (limited evaluation): Normal.                                       Medications   iohexoL (OMNIPAQUE 350) injection 100 mL (100 mLs Intravenous Given 3/24/25 1044)     Medical Decision Making  Urgent evaluation of an 79-year-old female who presents to the emergency department with daughter.  Patient has moderate dementia.  Hypertensive in triage.  Triage nurse requested for me to come to evaluate patient as she is concerned for stroke.    On my evaluation, she is VAN positive - complaining of visual disruption.  Symptoms started within the hour.  Symptoms are vague - some being chronic, but with the acute onset of "funny feeling in head, dizziness, and visual disruption" we will call stroke code.    CTA head and neck show no acute bleed or large occlusion.  No significant lab abnormality other than elevated cholesterol - which is being followed by her PCP.  Vascular neurologist virtually examined patient and reported: "NIHSS 1 for baseline inability to state the month. Non focal exam. The episodes of lightheadedness have been recurrent. CTH and CTA H/N unremarkable. No intervention from my perspective."    Daughter feels comfortable with discharge.  PCP did call while patient was in ED and advised adjustment to blood pressure medicine.  Advised to keep follow up appts.  Advised to return to ED with any worsening symptoms or concerns.    Discussed in depth with supervising physician who agrees with plan.    Amount and/or Complexity of Data Reviewed  Labs: ordered.  Radiology: ordered.    Risk  Prescription drug management.                                      Clinical Impression:  Final diagnoses:  [R29.818] Acute focal neurological deficit  [R42] Dizziness (Primary)  [I10] Uncontrolled hypertension          ED Disposition Condition    Discharge Stable          ED Prescriptions    None       Follow-up Information       Follow up With Specialties Details Why " Contact Info    Brenda Jean NP Family Medicine In 2 days  [address] [facility] [address]  [phone]                 [1]   Social History  Tobacco Use    Smoking status: Former     Current packs/day: 0.00     Types: Cigarettes     Quit date: 1999     Years since quittin.6    Smokeless tobacco: Never   Substance Use Topics    Alcohol use: No    Drug use: No        Quiana Licea PA-C  [phone]

## 2025-03-24 NOTE — ED TRIAGE NOTES
"Pt was seen at pain management clinic around 10 am today when she began having a HA, eye pain, and dizziness. Denies unilateral weakness. No slurred speech noted. Pt confused upon assessment but has hx of dementia. Pt repeatedly asking "why am I here? What did I do? Am I sick?" Pt's daughter states pt took medications this morning but has not eaten yet today. /87  "

## 2025-03-24 NOTE — TELEMEDICINE CONSULT
[facility]  Vascular Neurology  Comprehensive Stroke Center  TeleVascular Neurology Acute Consultation Note        Consult Information  Consult to Telemedicine - Acute Stroke  Consult performed by: Manda Malik MD  Consult ordered by: Barak Solano MD          Consulting Provider: BARAK SOLANO   Current Providers  No providers found    Patient Location:  [facility] EMERGENCY DEPARTMENT Emergency Department    Spoke hospital nurse at bedside with patient assisting consultant.  Patient information was obtained from patient, relative(s), and past medical records.       Stroke Documentation  Acute Stroke Times   Last Known Normal Date: 03/24/25  Last Known Normal Time: 0850  Symptom Onset Date: 03/24/25  Unknown Symptom Onset Time: Unknown Time  Stroke Team Called Date: 03/24/25  Stroke Team Called Time: 1041  Stroke Team Arrival Date: 03/24/25  Stroke Team Arrival Time: 1042 (Patient in CT)  CT Interpretation Time: 1049  Thrombolytic Therapy Recommended: No  CTA Interpretation Time: 1049  Thrombectomy Recommended: No    NIH Scale:  1a. Level of Consciousness: 0-->Alert, keenly responsive  1b. LOC Questions: 1-->Answers one question correctly (Patient w/ baseline dementi and doesnt know the month usually)  1c. LOC Commands: 0-->Performs both tasks correctly  2. Best Gaze: 0-->Normal  3. Visual: 0-->No visual loss  4. Facial Palsy: 0-->Normal symmetrical movements  5a. Motor Arm, Left: 0-->No drift, limb holds 90 (or 45) degrees for full 10 secs  5b. Motor Arm, Right: 0-->No drift, limb holds 90 (or 45) degrees for full 10 secs  6a. Motor Leg, Left: 0-->No drift, leg holds 30 degree position for full 5 secs  6b. Motor Leg, Right: 0-->No drift, leg holds 30 degree position for full 5 secs  7. Limb Ataxia: 0-->Absent  8. Sensory: 0-->Normal, no sensory loss  9. Best Language: 0-->No aphasia, normal  10. Dysarthria: 0-->Normal  11. Extinction and Inattention (formerly Neglect): 0-->No  "abnormality  Total (NIH Stroke Scale): 1      Modified [place]: Score: 3  [place] Coma Scale:     ABCD2 Score:    [id number] Score:    HAS -BLED Score:    ICH Score:    Hunt & De Los Santos Classification:      Blood pressure (!) 205/81, temperature 98.1 °F (36.7 °C), temperature source Oral, resp. rate (!) 54, height 5' 3" (1.6 m), weight 66.2 kg (146 lb), SpO2 98%.      In my opinion, this was a: Tier 1; VAN Stroke Assessment: Negative     Medical Decision Making  HPI:  79 y.o. female w/ PMHx of HTN, Breast Ca, HLD, chronic pain who presents w/ reported lightheadedness, visual deficits and headache.   This happened while she was at the pain management office.   Reported lightheadedness, tension in the forehead and "eyes feeling lightheaded".   Previously had complained of episodes of lightheadedness but never reported "eyes feeling lightheaded" as per daughter. This was the first time.   Patient w/ recent diagnosis of vascular dementia and is a poor historian.     Images personally reviewed and interpreted:  Study: Head CT and CTA Head & Neck  Study Interpretation: No acute intracranial abnormalities, specifically no early signs of ischemia and no intracranial hemorrhage.   No LVO, no hemodynamically significant stenosis.           Assessment and plan:  NIHSS 1 due to underlying dementia.   Not a TNK candidate due to no focal neurologic deficits.   No LVO.   Suspect symptoms related to hypertensive emergency.   Further care as per ED.     Lytics recommendation: Thrombolytic therapy not recommended due to Suspected stroke mimic  and Mild Non-Disabling Symptoms    Thrombectomy recommendation: No; No large vessel occlusion identified on imaging   Placement recommendation: pending further studies               ROS  Physical Exam  Past Medical History:   Diagnosis Date    Asthma     Breast cancer     Fibromyalgia     Hypertension     Psoriasis vulgaris      Past Surgical History:   Procedure Laterality Date    APPENDECTOMY  " 1962    BREAST LUMPECTOMY  7-    breast cancer    BUNIONECTOMY  2010    CORONARY ANGIOGRAPHY  2014    no obstructive CAD    EPIDURAL STEROID INJECTION N/A 8/11/2020    Procedure: LUMBAR L2/3 NANCY DIRECT REFERRAL;  Surgeon: Ion Bearden MD;  Location: [facility];  Service: Pain Management;  Laterality: N/A;  NEEDS CONSENT    KNEE ARTHROSCOPY Right 2004    SHOULDER SURGERY  1982    right/had a bone infectioni    THYROIDECTOMY  1977    hyperparathyroid&thyroidectomy 2006    TOTAL KNEE ARTHROPLASTY  10/13/2014     Family History   Problem Relation Name Age of Onset    Heart disease Mother      Hypertension Mother      Heart disease Father      Hypertension Father      Diabetes Brother      Hypertension Brother      Heart disease Brother      Kidney disease Brother      Skin cancer Neg Hx      Psoriasis Neg Hx         Diagnoses  No problems updated.    Manda Malik MD      Emergent/Acute neurological consultation requested by spoke provider due to critical concerns for possible cerebrovascular event that could result in permanent loss of neurologic/bodily function, severe disability or death of this patient.  Immediate/timely evaluation by a highly prepared expert is paramount for optimal outcomes  High risk for neurological deterioration if not properly diagnosed  High risk for neurological deterioration if not treated promplty/as soon as possible  Complex diagnostic evaluation may be required (advanced imaging)  High risk treatment options (thrombolytics and/or thrombectomy)    Patient care was coordinated with spoke provider, including but not limted to    Discussing likely diagnosis/etiology of symptoms  Making recommendations for further diagnostic studies  Making recommendations for intravenous thrombolytics or other advanced therapies  Making recommendations for disposition (admission/transfer for higher level of care)      Neurology consultation requested by spoke provider. Audiovisual encounter  with the patient performed using a secure connection.  Results and impressions from the visit are documented on this note and were communicated to the consulting provider/team via direct communication. The note has been shared for addition to the patients electronic medical record.

## 2025-03-31 ENCOUNTER — TELEPHONE (OUTPATIENT)
Dept: ORTHOPEDICS | Facility: CLINIC | Age: 80
End: 2025-03-31
Payer: COMMERCIAL

## 2025-03-31 NOTE — TELEPHONE ENCOUNTER
Spoke c patients daughter and  Confirmed XR & appt c Dr. Hanson. Advised pt that XR is located on 1st floor of [facility]. Pt expressed understanding & was thankful.

## 2025-04-01 ENCOUNTER — PATIENT MESSAGE (OUTPATIENT)
Dept: UROLOGY | Facility: CLINIC | Age: 80
End: 2025-04-01
Payer: COMMERCIAL

## 2025-04-01 ENCOUNTER — CLINICAL SUPPORT (OUTPATIENT)
Dept: PRIMARY CARE CLINIC | Facility: CLINIC | Age: 80
End: 2025-04-01
Payer: COMMERCIAL

## 2025-04-01 DIAGNOSIS — R35.0 FREQUENCY OF MICTURITION: ICD-10-CM

## 2025-04-01 PROCEDURE — 87086 URINE CULTURE/COLONY COUNT: CPT

## 2025-04-02 LAB — BACTERIA UR CULT: NORMAL

## 2025-04-03 ENCOUNTER — HOSPITAL ENCOUNTER (OUTPATIENT)
Dept: RADIOLOGY | Facility: OTHER | Age: 80
Discharge: HOME | End: 2025-04-03
Attending: ORTHOPAEDIC SURGERY
Payer: COMMERCIAL

## 2025-04-03 ENCOUNTER — OFFICE VISIT (OUTPATIENT)
Dept: ORTHOPEDICS | Facility: CLINIC | Age: 80
End: 2025-04-03
Payer: COMMERCIAL

## 2025-04-03 VITALS — BODY MASS INDEX: 25.78 KG/M2 | HEIGHT: 63 IN | WEIGHT: 145.5 LBS

## 2025-04-03 DIAGNOSIS — G89.29 CHRONIC RIGHT SHOULDER PAIN: Primary | ICD-10-CM

## 2025-04-03 DIAGNOSIS — M75.101 UNSP ROTATR-CUFF TEAR/RUPTR OF RIGHT SHOULDER, NOT TRAUMA: ICD-10-CM

## 2025-04-03 DIAGNOSIS — M25.511 CHRONIC RIGHT SHOULDER PAIN: Primary | ICD-10-CM

## 2025-04-03 DIAGNOSIS — G89.29 CHRONIC RIGHT SHOULDER PAIN: ICD-10-CM

## 2025-04-03 DIAGNOSIS — M25.511 CHRONIC RIGHT SHOULDER PAIN: ICD-10-CM

## 2025-04-03 PROCEDURE — 73030 X-RAY EXAM OF SHOULDER: CPT | Mod: TC,FY,RT

## 2025-04-03 PROCEDURE — 99999 PR PBB SHADOW E&M-EST. PATIENT-LVL III: CPT | Mod: PBBFAC,,, | Performed by: ORTHOPAEDIC SURGERY

## 2025-04-03 PROCEDURE — 99204 OFFICE O/P NEW MOD 45 MIN: CPT | Mod: 25,S$GLB,, | Performed by: ORTHOPAEDIC SURGERY

## 2025-04-03 PROCEDURE — 73030 X-RAY EXAM OF SHOULDER: CPT | Mod: 26,RT,, | Performed by: RADIOLOGY

## 2025-04-03 PROCEDURE — 20610 DRAIN/INJ JOINT/BURSA W/O US: CPT | Mod: RT,S$GLB,, | Performed by: ORTHOPAEDIC SURGERY

## 2025-04-03 RX ADMIN — TRIAMCINOLONE ACETONIDE 80 MG: 40 INJECTION, SUSPENSION INTRA-ARTICULAR; INTRAMUSCULAR at 10:04

## 2025-04-03 NOTE — PROCEDURES
Large Joint Aspiration/Injection: R subacromial bursa    Date/Time: 4/3/2025 10:00 AM    Performed by: Candi Ruiz MD  Authorized by: Candi Ruiz MD    Consent Done?:  Yes (Verbal)  Indications:  Arthritis  Site marked: the procedure site was marked    Timeout: prior to procedure the correct patient, procedure, and site was verified      Local anesthesia used?: Yes    Anesthesia:  Local infiltration  Local anesthetic:  Lidocaine 1% without epinephrine    Details:  Needle Size:  22 G  Approach:  Posterior  Location:  Shoulder  Site:  R subacromial bursa  Medications:  80 mg triamcinolone acetonide 40 mg/mL

## 2025-04-03 NOTE — PROGRESS NOTES
"Subjective:      Patient ID: Adri Rice is a 79 y.o. female.    Chief Complaint: Pain of the Right Shoulder      HPI  Adri Rice is a right hand dominant 79 y.o. female presenting today for right shoulder pain. She presents with her daughter today, she states her mom has dementia that started in August 2024. Her mom was very independent prior to her dementia. She has pian with all ADLs. She sees pain management         Review of patient's allergies indicates:   Allergen Reactions    Atorvastatin Other (See Comments)    Rosuvastatin Other (See Comments)     Felt vibration/myalgia     Sulfamethoxazole-trimethoprim Swelling, Diarrhea and Other (See Comments)         Current Medications[1]    Past Medical History:   Diagnosis Date    Asthma     Breast cancer     Fibromyalgia     Hypertension     Psoriasis vulgaris        Past Surgical History:   Procedure Laterality Date    APPENDECTOMY  1962    BREAST LUMPECTOMY  7-    breast cancer    BUNIONECTOMY  2010    CORONARY ANGIOGRAPHY  2014    no obstructive CAD    EPIDURAL STEROID INJECTION N/A 8/11/2020    Procedure: LUMBAR L2/3 NANCY DIRECT REFERRAL;  Surgeon: Ion Bearden MD;  Location: [facility];  Service: Pain Management;  Laterality: N/A;  NEEDS CONSENT    KNEE ARTHROSCOPY Right 2004    SHOULDER SURGERY  1982    right/had a bone infectioni    THYROIDECTOMY  1977    hyperparathyroid&thyroidectomy 2006    TOTAL KNEE ARTHROPLASTY  10/13/2014       Review of Systems:  Constitutional: Negative for chills and fever.   Respiratory: Negative for cough and shortness of breath.    Gastrointestinal: Negative for nausea and vomiting.   Skin: Negative for rash.   Neurological: Negative for dizziness and headaches.   Psychiatric/Behavioral: Negative for depression.   MSK as in HPI       OBJECTIVE:     PHYSICAL EXAM:  Ht 5' 3" (1.6 m)   Wt 66 kg (145 lb 8.1 oz)   LMP  (LMP Unknown)   BMI 25.77 kg/m²     GEN:  NAD, well-developed, " well-groomed.  NEURO: Awake, alert, and oriented. Normal attention and concentration.    PSYCH: Normal mood and affect. Behavior is normal.  HEENT: No cervical lymphadenopathy noted.  CARDIOVASCULAR: Radial pulses 2+ bilaterally. No LE edema noted.  PULMONARY: Breath sounds normal. No respiratory distress.  SKIN: Intact, no rashes.      MSK:     RUE:  Good active ROM of the wrist and fingers. AIN/PIN/Radial/Median/Ulnar Nerves assessed in isolation without deficit. Radial & Ulnar arteries palpated 2+. Capillary Refill <3s.  Flexion 160      LUE:  Good active ROM of the wrist and fingers. AIN/PIN/Radial/Median/Ulnar Nerves assessed in isolation without deficit. Radial & Ulnar arteries palpated 2+. Capillary Refill <3s.      RADIOGRAPHS:  No evidence for glenohumeral dislocation. Advanced degenerative changes of the glenohumeral joint noted. Widening of acromioclavicular interval, presumed postoperative   Comments: I have personally reviewed the imaging and I agree with the above radiologist's report.    ASSESSMENT/PLAN:       ICD-10-CM ICD-9-CM   1. Unsp rotatr-cuff tear/ruptr of right shoulder, not trauma  M75.101 727.61          No orders of the defined types were placed in this encounter.       Plan:     CSI right shoulder   PT ordered placed  RTC in 3 months to see ALFA      The patient indicates understanding of these issues and agrees to the plan.    Kary Vann ATC, [facility]   [facility]  [place], [place]          [1]   Current Outpatient Medications   Medication Sig Dispense Refill    albuterol (ACCUNEB) 0.63 mg/3 mL Nebu Take 0.63 mg by nebulization every 6 (six) hours as needed.      alendronate (FOSAMAX) 70 MG tablet Take 1 tablet (70 mg total) by mouth every 7 days. 12 tablet 3    amLODIPine (NORVASC) 5 MG tablet Take 5 mg by mouth once daily.       calcipotriene (DOVONOX) 0.005 % cream Apply topically 2 (two) times daily as needed.      fluticasone propionate (FLOVENT HFA) 220  mcg/actuation inhaler Take 2 puffs by mouth 2 (two) times daily.      fluticasone-umeclidin-vilanter (TRELEGY ELLIPTA) 200-62.5-25 mcg inhaler Inhale 1 puff into the lungs once daily. 60 each 11    levothyroxine (SYNTHROID) 75 MCG tablet Take 1 tablet (75 mcg total) by mouth once daily. 90 tablet 3    memantine (NAMENDA) 10 MG Tab Take 10 mg by mouth.      omeprazole (PRILOSEC) 20 MG capsule       pravastatin (PRAVACHOL) 40 MG tablet TAKE 1 TABLET BY MOUTH EVERY DAY 90 tablet 3    sertraline (ZOLOFT) 50 MG tablet Take 100 mg by mouth.      vitamin D (VITAMIN D3) 1000 units Tab Take 1,000 Units by mouth.       No current facility-administered medications for this visit.

## 2025-04-08 ENCOUNTER — HOSPITAL ENCOUNTER (OUTPATIENT)
Dept: CARDIOLOGY | Facility: HOSPITAL | Age: 80
Discharge: HOME | End: 2025-04-08
Attending: NURSE PRACTITIONER
Payer: COMMERCIAL

## 2025-04-08 VITALS
DIASTOLIC BLOOD PRESSURE: 68 MMHG | BODY MASS INDEX: 25.69 KG/M2 | SYSTOLIC BLOOD PRESSURE: 148 MMHG | HEIGHT: 63 IN | WEIGHT: 145 LBS | HEART RATE: 68 BPM

## 2025-04-08 DIAGNOSIS — I51.0 ACQUIRED CARDIAC SEPTAL DEFECT: ICD-10-CM

## 2025-04-08 DIAGNOSIS — I25.10 CORONARY ATHEROSCLEROSIS OF NATIVE CORONARY ARTERY: ICD-10-CM

## 2025-04-08 DIAGNOSIS — R13.10 DIFFICULTY IN SWALLOWING: Primary | ICD-10-CM

## 2025-04-08 PROCEDURE — 93306 TTE W/DOPPLER COMPLETE: CPT | Mod: 26,,, | Performed by: INTERNAL MEDICINE

## 2025-04-08 PROCEDURE — 93306 TTE W/DOPPLER COMPLETE: CPT

## 2025-04-09 LAB
ASCENDING AORTA: 2.42 CM
AV AREA BY CONTINUOUS VTI: 2.6 CM2
AV INDEX (PROSTH): 0.89
AV LVOT MEAN GRADIENT: 2 MMHG
AV LVOT PEAK GRADIENT: 4 MMHG
AV MEAN GRADIENT: 3 MMHG
AV PEAK GRADIENT: 7 MMHG
AV VALVE AREA BY VELOCITY RATIO: 2.2 CM²
AV VALVE AREA: 2.5 CM2
AV VELOCITY RATIO: 0.77
BSA FOR ECHO PROCEDURE: 1.71 M2
CV ECHO LV RWT: 0.39 CM
DOP CALC AO PEAK VEL: 1.3 M/S
DOP CALC AO VTI: 28 CM
DOP CALC LVOT AREA: 2.8 CM2
DOP CALC LVOT DIAMETER: 1.9 CM
DOP CALC LVOT PEAK VEL: 1 M/S
DOP CALC LVOT STROKE VOLUME: 70.6 CM3
DOP CALC RVOT AREA: 2.52 CM2
DOP CALC RVOT DIAMETER: 1.79 CM
DOP CALCLVOT PEAK VEL VTI: 24.9 CM
E WAVE DECELERATION TIME: 246 MS
E/A RATIO: 0.87
E/E' RATIO: 6 M/S
ECHO EF ESTIMATED: 66 %
ECHO LV POSTERIOR WALL: 0.8 CM (ref 0.6–1.1)
FRACTIONAL SHORTENING: 36.6 % (ref 28–44)
HR MV ECHO: 68 BPM
INTERVENTRICULAR SEPTUM: 0.8 CM (ref 0.6–1.1)
IVC DIAMETER: 1.18 CM
LA MAJOR: 4.8 CM
LA MINOR: 4.3 CM
LA WIDTH: 3.5 CM
LEFT ATRIUM SIZE: 3.2 CM
LEFT ATRIUM VOLUME INDEX MOD: 24 ML/M2
LEFT ATRIUM VOLUME INDEX: 26 ML/M2
LEFT ATRIUM VOLUME MOD: 40 ML
LEFT ATRIUM VOLUME: 43 CM3
LEFT INTERNAL DIMENSION IN SYSTOLE: 2.6 CM (ref 2.1–4)
LEFT VENTRICLE DIASTOLIC VOLUME INDEX: 42.6 ML/M2
LEFT VENTRICLE DIASTOLIC VOLUME: 72 ML
LEFT VENTRICLE MASS INDEX: 57.6 G/M2
LEFT VENTRICLE SYSTOLIC VOLUME INDEX: 14.2 ML/M2
LEFT VENTRICLE SYSTOLIC VOLUME: 24 ML
LEFT VENTRICULAR INTERNAL DIMENSION IN DIASTOLE: 4.1 CM (ref 3.5–6)
LEFT VENTRICULAR MASS: 97.3 G
LV LATERAL E/E' RATIO: 5.1
LV SEPTAL E/E' RATIO: 7.3
MV A" WAVE DURATION": 91.34 MS
MV MEAN GRADIENT: 1 MMHG
MV PEAK A VEL: 0.76 M/S
MV PEAK E VEL: 0.66 M/S
MV PEAK GRADIENT: 2 MMHG
OHS CV RV/LV RATIO: 0.54 CM
PISA TR MAX VEL: 2.3 M/S
PULM VEIN A" WAVE DURATION": 91.34 MS
PULM VEIN S/D RATIO: 1.15
PULMONIC VEIN PEAK A VELOCITY: 0.3 M/S
PV PEAK D VEL: 0.41 M/S
PV PEAK S VEL: 0.47 M/S
RA MAJOR: 3.96 CM
RA PRESSURE ESTIMATED: 3 MMHG
RA WIDTH: 2.72 CM
RIGHT ATRIAL AREA: 8.2 CM2
RIGHT VENTRICLE DIASTOLIC BASEL DIMENSION: 2.2 CM
RV TB RVSP: 5 MMHG
RV TISSUE DOPPLER FREE WALL SYSTOLIC VELOCITY 1 (APICAL 4 CHAMBER VIEW): 15.64 CM/S
SINUS: 2.66 CM
STJ: 2.65 CM
TDI LATERAL: 0.13 M/S
TDI SEPTAL: 0.09 M/S
TDI: 0.11 M/S
TRICUSPID ANNULAR PLANE SYSTOLIC EXCURSION: 2.27 CM
TV PEAK GRADIENT: 20 MMHG
TV REST PULMONARY ARTERY PRESSURE: 24 MMHG
Z-SCORE OF LEFT VENTRICULAR DIMENSION IN END DIASTOLE: -1.38
Z-SCORE OF LEFT VENTRICULAR DIMENSION IN END SYSTOLE: -0.91

## 2025-04-17 ENCOUNTER — OFFICE VISIT (OUTPATIENT)
Dept: PULMONOLOGY | Facility: CLINIC | Age: 80
End: 2025-04-17
Payer: COMMERCIAL

## 2025-04-17 VITALS
OXYGEN SATURATION: 98 % | BODY MASS INDEX: 24.84 KG/M2 | DIASTOLIC BLOOD PRESSURE: 74 MMHG | HEART RATE: 54 BPM | WEIGHT: 140.19 LBS | SYSTOLIC BLOOD PRESSURE: 151 MMHG

## 2025-04-17 DIAGNOSIS — J45.40 MODERATE PERSISTENT ASTHMA, UNCOMPLICATED: Primary | ICD-10-CM

## 2025-04-17 PROBLEM — G89.29 CHRONIC RIGHT SHOULDER PAIN: Status: ACTIVE | Noted: 2025-04-17

## 2025-04-17 PROBLEM — M25.511 CHRONIC RIGHT SHOULDER PAIN: Status: ACTIVE | Noted: 2025-04-17

## 2025-04-17 PROCEDURE — 99214 OFFICE O/P EST MOD 30 MIN: CPT | Mod: S$GLB,,,

## 2025-04-17 PROCEDURE — 99999 PR PBB SHADOW E&M-EST. PATIENT-LVL III: CPT | Mod: PBBFAC,,,

## 2025-04-17 NOTE — PROGRESS NOTES
"History and Physical Note  [facility]    Subjective:     Reason for visit: Asthma    Patient ID:  Adripennie Rice is a 79 y.o. female  History of Present Illness    Interval History 04/17/2025:   Patient is here with daughter April. We started patient on trelegy one augustine ago for her asthma symptoms. She reports improvement in cough with no recent episodes of exacerbation. She experiences shortness of breath every other day and denies wheezing. Per family member, she has history of difficulty recognizing asthma symptoms, often attributing them to being "short winded" without using her inhaler appropriately. She takes Trelegy inhaler in the mornings, rinses her mouth afterward, but complains about the taste. This has been manageable by rinsing mouth out after each use.     She reports recent weight loss from 149 lbs to 140 lbs within a week. Her caregiver notes consistent eating habits with 3-4 meals daily. The weight loss may be attributed to recent anxiety and increased physical activity, including extensive walking during a 4-day stay at a respite facility. She also reports increased fatigue.    Echocardiogram was normal.   Interval History 03/14/2025:  Patient presents with her daughter April that provides additional history. She was previously being followed by Pulm Department at [facility] for asthma. Asthma diagnosed in 1999. Last PFT showing evidence of obstruction with bronchodilator response upon review. She reports a worsening cough. She was previously taking flovent but has stopped since August 2024. She developed thrush from use that lasted 3-4 months and was reporting symptoms of dizziness when using.     Cough: two weeks of coughing  Sputum: clear   MMRC grade level: 4  Respiratory illness: none in the last 6 months  Last ED/UC visit: none in the last 6 months   Exercise: limited now   Immunization: not up to date  Current COPD treatment plan: albuterol just as needed     Her daughter April " reports post prandial cough especially with chips and dinner. Cough happens in the middle of the night and worse upon waking up.   She has persistent sinus drainage and allergic rhinitis. Recurrent coughing that last for about 10 minutes. Abx for sinus infection recently and no cough during that time.   She was previously on a PPI for gastric reflux.     Additional Pulmonary History:  Environmental Exposures: Heat, pollen, smoke   Exposure to Animals/Pets: none  History of exposures to TB: none  Family History of Lung Cancer: none  Childhood Illnesses:  none  Tobacco/Smokin; no heavy smoking history   Tobacco Use History[1]    Objective:     Vitals:    25 0852   BP: (!) 151/74   BP Location: Left arm   Patient Position: Sitting   Pulse: (!) 54   SpO2: 98%   Weight: 63.6 kg (140 lb 3.4 oz)         Physical Exam  Constitutional:       Appearance: Normal appearance. She is well-developed.   HENT:      Head: Normocephalic.   Cardiovascular:      Rate and Rhythm: Normal rate.      Pulses: Normal pulses.      Heart sounds: Normal heart sounds, S1 normal and S2 normal.   Pulmonary:      Effort: Pulmonary effort is normal.      Breath sounds: Normal breath sounds. No decreased breath sounds.   Musculoskeletal:      Right lower leg: No edema.      Left lower leg: No edema.   Skin:     General: Skin is warm.      Capillary Refill: Capillary refill takes less than 2 seconds.      Findings: No rash.      Nails: There is no clubbing.   Neurological:      Mental Status: She is alert and oriented to person, place, and time. Mental status is at baseline.   Psychiatric:         Attention and Perception: Attention normal.         Mood and Affect: Mood and affect normal.         Speech: Speech normal.         Behavior: Behavior is cooperative.        Personal Diagnostic Review and Interpretation  2025 CT chest  No high risk nodules   No emphysema  No pneumonia     Pertinent Studies Reviewed & Interpreted:  "    Pulmonary Function Tests:   pending    Other Pertinent Laboratories:  Lab Results   Component Value Date    WBC 3.92 03/24/2025    RBC 4.37 03/24/2025    HGB 13.4 03/24/2025    MCV 95 03/24/2025    MCH 30.7 03/24/2025    MCHC 32.4 03/24/2025    RDW 15.0 (H) 03/24/2025     03/24/2025    MPV 10.9 03/24/2025    GRAN 3.0 05/25/2021    GRAN 57.5 05/25/2021    LYMPH 24.5 03/24/2025    LYMPH 0.96 (L) 03/24/2025    MONO 13.0 03/24/2025    MONO 0.51 03/24/2025    EOS 7.7 03/24/2025    EOS 0.30 03/24/2025    BASO 0.04 05/25/2021       Assessment & Plan:      ASTHMA:  - Asthma symptoms may present as dry cough, chest tightness, or shortness of breath without wheezing.  - Patient to be aware of chest tightness or feeling "funny" in chest as potential asthma symptoms.  - Continued Trelegy inhaler daily in the mornings.  - Appears effective in managing respiratory symptoms.  - Advised on potential side effects including taste changes and importance of oral hygiene to prevent thrush.    ORAL CANDIDIASIS:  - Patient to monitor for any white plaque on tongue, dry or itchy feeling in mouth, or significant changes in taste.    FOLLOW-UP:  - Follow up in October, with option to be seen sooner if needed.  - Contact the office if experiencing sudden changes or worsening of condition.        RETURN TO CLINIC IN 6 MONTHS     Total professional time spent for the encounter: 30 minutes  Time was spent preparing to see the patient, reviewing results of prior testing, obtaining and/or reviewing separately obtained history, performing a medically appropriate examination and interview, counseling and educating the patient/family, ordering medications/tests/procedures, referring and communicating with other health care professionals, documenting clinical information in the electronic health record, and independently interpreting results.  This note was generated with the assistance of ambient listening technology. Verbal consent was " obtained by the patient and accompanying visitor(s) for the recording of patient appointment to facilitate this note. I attest to having reviewed and edited the generated note for accuracy, though some syntax or spelling errors may persist. Please contact the author of this note for any clarification.    Gini Tapia DNP       [1]   Social History  Tobacco Use   Smoking Status Former    Current packs/day: 0.00    Types: Cigarettes    Quit date: 1999    Years since quittin.7   Smokeless Tobacco Never

## 2025-04-23 ENCOUNTER — TELEPHONE (OUTPATIENT)
Dept: PAIN MEDICINE | Facility: CLINIC | Age: 80
End: 2025-04-23
Payer: COMMERCIAL

## 2025-04-23 NOTE — TELEPHONE ENCOUNTER
Staff spoke with patients daughter and got her rescheduled from 5/5 to 6/16 due to Dr. Farfan being out. Patients daughter is in agreement to the above and verbalized understanding.

## 2025-04-28 ENCOUNTER — TELEPHONE (OUTPATIENT)
Dept: SPEECH THERAPY | Facility: HOSPITAL | Age: 80
End: 2025-04-28
Payer: COMMERCIAL

## 2025-04-28 RX ORDER — TRIAMCINOLONE ACETONIDE 40 MG/ML
80 INJECTION, SUSPENSION INTRA-ARTICULAR; INTRAMUSCULAR
Status: DISCONTINUED | OUTPATIENT
Start: 2025-04-03 | End: 2025-04-28 | Stop reason: HOSPADM

## 2025-04-28 NOTE — TELEPHONE ENCOUNTER
Sw pt daughter let her know mbss & eso requested for 5/20@2. Sent to radiology awaiting confirmation will give her a callback tomorrow.

## 2025-04-28 NOTE — PROGRESS NOTES
Patient ID: Adri Rice is a 79 y.o. female.    Chief Complaint: Pain of the Right Shoulder    History of Present Illness    CHIEF COMPLAINT:  Chronic shoulder pain    HPI:  Ms. Rice presents with chronic shoulder pain, particularly severe over the past 3-4 years. Her daughter reports a history of ER visits in [facility] and [place] due to pain intensity. A fall on the shoulder occurred years ago, but no MRI was performed. A report from about five years ago mentioned tendon tears and deltoid issues on both sides. She received an injection in her neck for shoulder pain, which exacerbated the condition, causing radiating pain and increasing discomfort. She has a history of therapy and has seen Dr. Epstein, an orthopedic surgeon who is now retired. 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 A pain management appointment was rescheduled to May due to high blood pressure and headaches during the initial visit.    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 She also has neuropathy. The daughter notes that her mother's physical condition has been deteriorating rapidly over the past two years. She is currently on Zoloft, which has helped with sleep issues.    PREVIOUS TREATMENTS:  Ms. Rice had an injection in her neck for shoulder pain. She has undergone physical therapy for her shoulder. Approximately 3 years ago, she received a steroid injection in her neck by Dr. Lau at [facility] in [place], which unfortunately worsened her pain and caused it to radiate down.    MEDICATIONS:  Ms. Rice is on Zoloft.    WORK STATUS:  Ms. Rice is an 79-year-old woman who was extremely independent prior to August. 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 Her condition has changed rapidly over the past two years. Currently, she is just sitting at home.    IMAGING:  The X-ray of the shoulder revealed big bone spurs and  arthritis.      ROS:  General: denies fever, denies chills, denies fatigue, denies weight gain, denies weight loss, reports sleep disturbances  Eyes: denies vision changes, denies redness, denies discharge  ENT: denies ear pain, denies nasal congestion, denies sore throat  Cardiovascular: denies chest pain, denies palpitations, denies lower extremity edema  Respiratory: denies cough, denies shortness of breath  Gastrointestinal: denies abdominal pain, denies nausea, denies vomiting, denies diarrhea, denies constipation, denies blood in stool  Genitourinary: denies dysuria, denies hematuria, denies frequency  Musculoskeletal: reports joint pain, denies muscle pain  Skin: denies rash, denies lesion  Neurological: reports headache, denies dizziness, reports numbness, reports tingling, reports nerve pain, reports memory problems, reports confusion or disorientation, reports thought or thinking problems or concerns  Psychiatric: denies anxiety, denies depression, reports sleep difficulty         Physical Exam    Musculoskeletal: Able to bend from standing to touch toes.         Assessment & Plan     Right shoulder steroid injection administered today.              No follow-ups on file.    This note was generated with the assistance of ambient listening technology. Verbal consent was obtained by the patient and accompanying visitor(s) for the recording of patient appointment to facilitate this note. I attest to having reviewed and edited the generated note for accuracy, though some syntax or spelling errors may persist. Please contact the author of this note for any clarification.

## 2025-04-29 ENCOUNTER — TELEPHONE (OUTPATIENT)
Dept: SPEECH THERAPY | Facility: HOSPITAL | Age: 80
End: 2025-04-29
Payer: COMMERCIAL

## 2025-04-29 NOTE — TELEPHONE ENCOUNTER
Josselin pt daughter to schedule mbss & eso 5/20@2pm. Informed to fast 2hrs before appt [facility] radiology clinic at [facility].

## 2025-04-30 ENCOUNTER — TELEPHONE (OUTPATIENT)
Dept: PAIN MEDICINE | Facility: CLINIC | Age: 80
End: 2025-04-30
Payer: COMMERCIAL

## 2025-05-02 ENCOUNTER — OFFICE VISIT (OUTPATIENT)
Dept: CARDIOLOGY | Facility: CLINIC | Age: 80
End: 2025-05-02
Payer: COMMERCIAL

## 2025-05-02 VITALS — WEIGHT: 137.19 LBS | HEART RATE: 65 BPM | OXYGEN SATURATION: 97 % | BODY MASS INDEX: 24.3 KG/M2

## 2025-05-02 DIAGNOSIS — R00.1 BRADYCARDIA, UNSPECIFIED: ICD-10-CM

## 2025-05-02 DIAGNOSIS — R94.31 ABNORMAL ELECTROCARDIOGRAM (ECG) (EKG): ICD-10-CM

## 2025-05-02 DIAGNOSIS — I25.10 ATHSCL HEART DISEASE OF NATIVE CORONARY ARTERY W/O ANG PCTRS: ICD-10-CM

## 2025-05-02 PROCEDURE — 99999 PR PBB SHADOW E&M-EST. PATIENT-LVL IV: CPT | Mod: PBBFAC,,, | Performed by: INTERNAL MEDICINE

## 2025-05-02 RX ORDER — IRBESARTAN 75 MG/1
75 TABLET ORAL DAILY
COMMUNITY
Start: 2025-03-27

## 2025-05-02 NOTE — PROGRESS NOTES
[facility]    Chief Complaint  Chief Complaint   Patient presents with    Shortness of Breath       HPI:    Since she was last seen, dementia has progressed.  She is here with her daughter.  Primary concern is shortness of breath.  Sometimes it occurs with exertion but not always.  At other times she may get short of breath at rest.  No reports of chest pain.  Her daughter believes at least a component of it is anxiety.  Concerned about coronary calcification noted on chest CT.  That was ordered to assess for lung disease and pulmonary suggested her heart may be playing a role in dyspnea.    Medications  Current Medications[1]     History  Past Medical History:   Diagnosis Date    Asthma     Breast cancer     Fibromyalgia     Hypertension     Psoriasis vulgaris      Past Surgical History:   Procedure Laterality Date    APPENDECTOMY  1962    BREAST LUMPECTOMY  7-    breast cancer    BUNIONECTOMY  2010    CORONARY ANGIOGRAPHY  2014    no obstructive CAD    EPIDURAL STEROID INJECTION N/A 8/11/2020    Procedure: LUMBAR L2/3 NANCY DIRECT REFERRAL;  Surgeon: Ion Bearden MD;  Location: [facility];  Service: Pain Management;  Laterality: N/A;  NEEDS CONSENT    KNEE ARTHROSCOPY Right 2004    SHOULDER SURGERY  1982    right/had a bone infectioni    THYROIDECTOMY  1977    hyperparathyroid&thyroidectomy 2006    TOTAL KNEE ARTHROPLASTY  10/13/2014     Social History[2]  Family History   Problem Relation Name Age of Onset    Heart disease Mother      Hypertension Mother      Heart disease Father      Hypertension Father      Diabetes Brother      Hypertension Brother      Heart disease Brother      Kidney disease Brother      Skin cancer Neg Hx      Psoriasis Neg Hx          Allergies  Review of patient's allergies indicates:   Allergen Reactions    Atorvastatin Other (See Comments)    Rosuvastatin Other (See Comments)     Felt vibration/myalgia     Sulfamethoxazole-trimethoprim Swelling, Diarrhea  and Other (See Comments)       Review of Systems   Review of Systems   Unable to perform ROS: Dementia       Physical Exam  Vitals:    05/02/25 1003   BP: (!) (P) 126/58   Pulse: 65     Wt Readings from Last 1 Encounters:   05/02/25 62.2 kg (137 lb 3.2 oz)     Physical Exam  Constitutional:       General: She is not in acute distress.     Appearance: She is obese. She is not toxic-appearing or diaphoretic.   HENT:      Head: Normocephalic and atraumatic.   Eyes:      General: No scleral icterus.     Conjunctiva/sclera: Conjunctivae normal.   Neck:      Thyroid: No thyromegaly.      Vascular: No carotid bruit, hepatojugular reflux or JVD.      Trachea: Trachea normal.   Cardiovascular:      Rate and Rhythm: Normal rate and regular rhythm. No extrasystoles are present.     Chest Wall: PMI is not displaced.      Pulses:           Carotid pulses are 2+ on the right side and 2+ on the left side.       Radial pulses are 2+ on the right side and 2+ on the left side.        Dorsalis pedis pulses are 2+ on the right side and 2+ on the left side.        Posterior tibial pulses are 2+ on the right side and 2+ on the left side.      Heart sounds: S1 normal and S2 normal. No murmur heard.     No S3 or S4 sounds.   Pulmonary:      Effort: No accessory muscle usage or respiratory distress.      Breath sounds: No decreased breath sounds, wheezing, rhonchi or rales.   Abdominal:      General: Bowel sounds are normal. There is no abdominal bruit.      Palpations: Abdomen is soft. There is no hepatomegaly, splenomegaly or pulsatile mass.      Tenderness: There is no abdominal tenderness.   Musculoskeletal:         General: No tenderness or deformity.      Right lower leg: No edema.      Left lower leg: No edema.   Skin:     General: Skin is warm and dry.      Coloration: Skin is not cyanotic or pale.      Nails: There is no clubbing.   Neurological:      General: No focal deficit present.      Mental Status: She is alert and oriented  to person, place, and time.   Psychiatric:         Speech: Speech normal.         Behavior: Behavior normal. Behavior is cooperative.         Labs  Hospital Outpatient Visit on 04/08/2025   Component Date Value Ref Range Status    LV Diastolic Volume 04/08/2025 72  mL Final    Echo EF Estimated 04/08/2025 66  % Final    LV Systolic Volume 04/08/2025 24  mL Final    IVS 04/08/2025 0.8  0.6 - 1.1 cm Final    LVIDd 04/08/2025 4.1  3.5 - 6.0 cm Final    LVIDs 04/08/2025 2.6  2.1 - 4.0 cm Final    LVOT diameter 04/08/2025 1.9  cm Final    PW 04/08/2025 0.8  0.6 - 1.1 cm Final    AV LVOT peak gradient 04/08/2025 4  mmHg Final    LVOT mn grad 04/08/2025 2  mmHg Final    LVOT peak mika 04/08/2025 1.0  m/s Final    LVOT peak VTI 04/08/2025 24.9  cm Final    RV- thrasher basal diam 04/08/2025 2.2  cm Final    RVOT prox diameter 04/08/2025 1.79  cm Final    RV S' 04/08/2025 15.64  cm/s Final    LA size 04/08/2025 3.2  cm Final    Left Atrium Major Axis 04/08/2025 4.8  cm Final    Left Atrium Minor Axis 04/08/2025 4.3  cm Final    LA Vol (MOD) 04/08/2025 40  mL Final    RA Major Axis 04/08/2025 3.96  cm Final    RA Area 04/08/2025 8.2  cm2 Final    AV valve area 04/08/2025 2.5  cm2 Final    AV area by cont VTI 04/08/2025 2.6  cm2 Final    AV peak gradient 04/08/2025 7  mmHg Final    AV mean gradient 04/08/2025 3  mmHg Final    Ao peak mika 04/08/2025 1.3  m/s Final    Ao VTI 04/08/2025 28.0  cm Final    MV Peak A Mika 04/08/2025 0.76  m/s Final    E wave deceleration time 04/08/2025 246  ms Final    MV Peak E Mika 04/08/2025 0.66  m/s Final    E/A ratio 04/08/2025 0.87   Final    LV LATERAL E/E' RATIO 04/08/2025 5.1   Final    LV SEPTAL E/E' RATIO 04/08/2025 7.3   Final    TDI LATERAL 04/08/2025 0.13  m/s Final    TDI SEPTAL 04/08/2025 0.09  m/s Final    MV peak gradient 04/08/2025 2  mmHg Final    MV mean gradient 04/08/2025 1  mmHg Final    TV peak gradient 04/08/2025 20  mmHg Final    TR Max Mika 04/08/2025 2.3  m/s Final     "Ascending aorta 04/08/2025 2.42  cm Final    STJ 04/08/2025 2.65  cm Final    P vein A 04/08/2025 0.3  m/s Final    MV "A" wave duration 04/08/2025 91.34  ms Final    Pulm vein "A" wave 04/08/2025 91.34  ms Final    PV Peak D Mika 04/08/2025 0.41  m/s Final    PV Peak S Mika 04/08/2025 0.47  m/s Final    IVC diameter 04/08/2025 1.18  cm Final    Sinus 04/08/2025 2.66  cm Final    LA WIDTH 04/08/2025 3.5  cm Final    RA Width 04/08/2025 2.72  cm Final    TAPSE 04/08/2025 2.27  cm Final    BSA 04/08/2025 1.71  m2 Final    LVOT stroke volume 04/08/2025 70.6  cm3 Final    LV Systolic Volume Index 04/08/2025 14.2  mL/m2 Final    LV Diastolic Volume Index 04/08/2025 42.60  mL/m2 Final    LVOT area 04/08/2025 2.8  cm2 Final    FS 04/08/2025 36.6  28 - 44 % Final    Left Ventricle Relative Wall Thick* 04/08/2025 0.39  cm Final    LV mass 04/08/2025 97.3  g Final    LV Mass Index 04/08/2025 57.6  g/m2 Final    E/E' ratio 04/08/2025 6  m/s Final    ISI 04/08/2025 26  mL/m2 Final    LA Vol 04/08/2025 43  cm3 Final    Pulm vein S/D ratio 04/08/2025 1.15   Final    RVOT area 04/08/2025 2.52  cm2 Final    RV/LV Ratio 04/08/2025 0.54  cm Final    ISI (MOD) 04/08/2025 24  mL/m2 Final    AV Velocity Ratio 04/08/2025 0.77   Final    AV index (prosthetic) 04/08/2025 0.89   Final    SIERRA by Velocity Ratio 04/08/2025 2.2  cm² Final    Mean e' 04/08/2025 0.11  m/s Final    ZLVIDS 04/08/2025 -0.91   Final    ZLVIDD 04/08/2025 -1.38   Final    Mitral Valve Heart Rate 04/08/2025 68  bpm Final    TV resting pulmonary artery pressu* 04/08/2025 24  mmHg Final    RV TB RVSP 04/08/2025 5  mmHg Final    Est. RA pres 04/08/2025 3  mmHg Final   Clinical Support on 04/01/2025   Component Date Value Ref Range Status    Urine Culture 04/01/2025 Multiple organisms isolated. None in predominance. Repeat if clinically necessary.   Final   Admission on 03/24/2025, Discharged on 03/24/2025   Component Date Value Ref Range Status    POCT Glucose 03/24/2025 " 94  70 - 110 mg/dL Final    Sodium 03/24/2025 140  136 - 145 mmol/L Final    Potassium 03/24/2025 4.8  3.5 - 5.1 mmol/L Final    Specimen slightly hemolyzed.    Chloride 03/24/2025 105  95 - 110 mmol/L Final    CO2 03/24/2025 25  23 - 29 mmol/L Final    Glucose 03/24/2025 83  70 - 110 mg/dL Final    BUN 03/24/2025 15  8 - 23 mg/dL Final    Creatinine 03/24/2025 0.8  0.5 - 1.4 mg/dL Final    Calcium 03/24/2025 10.2  8.7 - 10.5 mg/dL Final    Protein Total 03/24/2025 7.3  6.0 - 8.4 gm/dL Final    Albumin 03/24/2025 4.0  3.5 - 5.2 g/dL Final    Bilirubin Total 03/24/2025 0.5  0.1 - 1.0 mg/dL Final    For infants and newborns, interpretation of results should be based   on gestational age, weight and in agreement with clinical   observations.    Premature Infant recommended reference ranges:   0-24 hours:  <8.0 mg/dL   24-48 hours: <12.0 mg/dL   3-5 days:    <15.0 mg/dL   6-29 days:   <15.0 mg/dL    ALP 03/24/2025 102  40 - 150 unit/L Final    AST 03/24/2025 30  11 - 45 unit/L Final    ALT 03/24/2025 20  10 - 44 unit/L Final    Anion Gap 03/24/2025 10  8 - 16 mmol/L Final    eGFR 03/24/2025 >60  >60 mL/min/1.73/m2 Final    Estimated GFR calculated using the CKD-EPI creatinine (2021) equation.    PT 03/24/2025 11.1  9.0 - 12.5 seconds Final    INR 03/24/2025 1.0  0.8 - 1.2 Final    Coumadin Therapy:    2.0 - 3.0 for INR for all indicators except mechanical heart valves    and antiphospholipid syndromes which should use 2.5 - 3.5.    TSH 03/24/2025 2.184  0.400 - 4.000 uIU/mL Final    QRS Duration 03/24/2025 96  ms Final    OHS QTC Calculation 03/24/2025 402  ms Final    Cholesterol Total 03/24/2025 256 (H)  120 - 199 mg/dL Final    The National Cholesterol Education Program (NCEP) has set the  following guidelines (reference ranges) for Cholesterol:  Optimal.....................<200 mg/dL  Borderline High.............200-239 mg/dL  High........................> or = 240 mg/dL    Triglyceride 03/24/2025 54  30 - 150 mg/dL  Final    The National Cholesterol Education Program (NCEP) has set the  following guidelines (reference values) for triglycerides:  Normal......................<150 mg/dL  Borderline High.............150-199 mg/dL  High........................200-499 mg/dL    HDL Cholesterol 03/24/2025 81 (H)  40 - 75 mg/dL Final    The National Cholesterol Education Program (NCEP) has set the   following guidelines (reference values) for HDL Cholesterol:   Low...............<40 mg/dL   Optimal...........>60 mg/dL    LDL Cholesterol 03/24/2025 164.2 (H)  63.0 - 159.0 mg/dL Final    The National Cholesterol Education Program (NCEP) has set the  following guidelines (reference values) for LDL Cholesterol:  Optimal.......................<130 mg/dL  Borderline High...............130-159 mg/dL  High..........................160-189 mg/dL  Very High.....................>190 mg/dL  LDL calculated using the Friedewald equation.    HDL/Cholesterol Ratio 03/24/2025 31.6  20.0 - 50.0 % Final    Cholesterol/HDL Ratio 03/24/2025 3.2  2.0 - 5.0 Final    Non HDL Cholesterol 03/24/2025 175  mg/dL Final    Risk category and Non-HDL cholesterol goals:  Coronary heart disease (CHD)or equivalent (10-year risk of CHD >20%):  Non-HDL cholesterol goal     <130 mg/dL  Two or more CHD risk factors and 10-year risk of CHD <= 20%:  Non-HDL cholesterol goal     <160 mg/dL  0 to 1 CHD risk factor:  Non-HDL cholesterol goal     <190 mg/dL    WBC 03/24/2025 3.92  3.90 - 12.70 K/uL Final    RBC 03/24/2025 4.37  4.00 - 5.40 M/uL Final    HGB 03/24/2025 13.4  12.0 - 16.0 gm/dL Final    HCT 03/24/2025 41.4  37.0 - 48.5 % Final    MCV 03/24/2025 95  82 - 98 fL Final    MCH 03/24/2025 30.7  27.0 - 50.0 pg Final    MCHC 03/24/2025 32.4  32.0 - 36.0 g/dL Final    RDW 03/24/2025 15.0 (H)  11.5 - 14.5 % Final    Platelet Count 03/24/2025 157  150 - 450 K/uL Final    MPV 03/24/2025 10.9  9.2 - 12.9 fL Final    Nucleated RBC 03/24/2025 0  <=0 /100 WBC Final    Neut %  03/24/2025 54.0  38 - 73 % Final    Lymph % 03/24/2025 24.5  18 - 48 % Final    Mono % 03/24/2025 13.0  4 - 15 % Final    Eos % 03/24/2025 7.7  <=8 % Final    Basophil % 03/24/2025 0.5  <=1.9 % Final    Imm Grans % 03/24/2025 0.3  0.0 - 0.5 % Final    Neut # 03/24/2025 2.12  1.8 - 7.7 K/uL Final    Lymph # 03/24/2025 0.96 (L)  1 - 4.8 K/uL Final    Mono # 03/24/2025 0.51  0.3 - 1 K/uL Final    Eos # 03/24/2025 0.30  <=0.5 K/uL Final    Baso # 03/24/2025 0.02  <=0.2 K/uL Final    Imm Grans # 03/24/2025 0.01  0.00 - 0.04 K/uL Final    Mild elevation in immature granulocytes is non specific and can be seen in a variety of conditions including stress response, acute inflammation, trauma and pregnancy. Correlation with other laboratory and clinical findings is essential.    POC PTINR 03/24/2025 1.2  0.9 - 1.2 Final    Sample 03/24/2025 VENOUS   Final    POC Creatinine 03/24/2025 0.9  0.5 - 1.4 mg/dL Final    Sample 03/24/2025 VENOUS   Final   Lab Visit on 03/14/2025   Component Date Value Ref Range Status    TSH 03/14/2025 1.593  0.400 - 4.000 uIU/mL Final    Sodium 03/14/2025 140  136 - 145 mmol/L Final    Potassium 03/14/2025 4.2  3.5 - 5.1 mmol/L Final    Chloride 03/14/2025 105  95 - 110 mmol/L Final    CO2 03/14/2025 27  23 - 29 mmol/L Final    Glucose 03/14/2025 84  70 - 110 mg/dL Final    BUN 03/14/2025 14  8 - 23 mg/dL Final    Creatinine 03/14/2025 0.8  0.5 - 1.4 mg/dL Final    Calcium 03/14/2025 9.9  8.7 - 10.5 mg/dL Final    Total Protein 03/14/2025 7.0  6.0 - 8.4 g/dL Final    Albumin 03/14/2025 3.8  3.5 - 5.2 g/dL Final    Total Bilirubin 03/14/2025 0.3  0.1 - 1.0 mg/dL Final    Comment: For infants and newborns, interpretation of results should be based  on gestational age, weight and in agreement with clinical  observations.    Premature Infant recommended reference ranges:  Up to 24 hours.............<8.0 mg/dL  Up to 48 hours............<12.0 mg/dL  3-5 days..................<15.0 mg/dL  6-29  days.................<15.0 mg/dL      Alkaline Phosphatase 03/14/2025 108  40 - 150 U/L Final    AST 03/14/2025 23  10 - 40 U/L Final    ALT 03/14/2025 20  10 - 44 U/L Final    eGFR 03/14/2025 >60.0  >60 mL/min/1.73 m^2 Final    Anion Gap 03/14/2025 8  8 - 16 mmol/L Final    Vit D, 25-Hydroxy 03/14/2025 24 (L)  30 - 96 ng/mL Final    Comment: Vitamin D deficiency.........<10 ng/mL                              Vitamin D insufficiency......10-29 ng/mL       Vitamin D sufficiency........> or equal to 30 ng/mL  Vitamin D toxicity............>100 ng/mL     Office Visit on 03/10/2025   Component Date Value Ref Range Status    Glucose, UA 03/10/2025 Negative   Final    Bilirubin, POC 03/10/2025 Negative   Final    Ketones, UA 03/10/2025 Negative   Final    Spec Grav UA 03/10/2025 1.025   Final    Blood, UA 03/10/2025 Negative   Final    pH, UA 03/10/2025 6.0   Final    Protein, POC 03/10/2025 Negative   Final    Urobilinogen, UA 03/10/2025 Normal   Final    Nitrite, UA 03/10/2025 Negative   Final    WBC, UA 03/10/2025 Negative   Final    Color, UA 03/10/2025 Yellow   Final    Clarity, UA 03/10/2025 Clear   Final    POC Residual Urine Volume 03/10/2025 27  0 - 100 mL Final    Specimen UA 03/10/2025 Urine, Clean Catch   Final    Color, UA 03/10/2025 Yellow  Yellow, Straw, Nancie Final    Appearance, UA 03/10/2025 Clear  Clear Final    pH, UA 03/10/2025 6.0  5.0 - 8.0 Final    Specific Gravity, UA 03/10/2025 1.025  1.005 - 1.030 Final    Protein, UA 03/10/2025 Negative  Negative Final    Comment: Recommend a 24 hour urine protein or a urine   protein/creatinine ratio if globulin induced proteinuria is  clinically suspected.      Glucose, UA 03/10/2025 Negative  Negative Final    Ketones, UA 03/10/2025 Negative  Negative Final    Bilirubin (UA) 03/10/2025 Negative  Negative Final    Occult Blood UA 03/10/2025 Negative  Negative Final    Nitrite, UA 03/10/2025 Negative  Negative Final    Urobilinogen, UA 03/10/2025 Negative   Negative EU/dL Final    Leukocytes, UA 03/10/2025 Trace (A)  Negative Final    Urine Culture, Routine 03/10/2025  (A)   Final                    Value:PSEUDOMONAS AERUGINOSA  10,000 - 49,999 cfu/ml      RBC, UA 03/10/2025 0  0 - 4 /hpf Final    WBC, UA 03/10/2025 1  0 - 5 /hpf Final    Squam Epithel, UA 03/10/2025 1  /hpf Final    Microscopic Comment 03/10/2025 SEE COMMENT   Final    Comment: Other formed elements not mentioned in the report are not   present in the microscopic examination.          Imaging  Echo  Result Date: 4/9/2025  Normal echocardiogram with Doppler     X-Ray Shoulder Trauma 3 view Right  Result Date: 4/3/2025  EXAMINATION: XR SHOULDER TRAUMA 3 VIEW RIGHT CLINICAL HISTORY: Unspecified rotator cuff tear or rupture of right shoulder, not specified as traumatic TECHNIQUE: Two or three views of the right shoulder were performed. COMPARISON: None FINDINGS: Bones: No fracture.  No lytic or blastic lesion. Joints: No evidence for glenohumeral dislocation.  Advanced degenerative changes of the glenohumeral joint noted.  Widening of acromioclavicular interval, presumed postoperative. Soft tissues: Surgical clip noted in the axilla region.     As above. Electronically signed by: Monty Medina MD Date:    04/03/2025 Time:    09:26      Assessment  1. Athscl heart disease of native coronary artery w/o ang pctrs  She has coronary calcification.  She has never had a diagnosis of coronary artery disease.  Last assessment of ischemia was a stress test in 2018 which was negative.  Coronary angiography 10 years ago showed no obstructive disease.  - Ambulatory referral/consult to Cardiology    2. Bradycardia, unspecified  Sinus bradycardia.  Has been worked up in the past.  No significant bradyarrhythmias.  - Ambulatory referral/consult to Cardiology    3. Abnormal electrocardiogram (ECG) (EKG)  Septal Q-waves are not indicative of a prior infarct as her echocardiogram shows normal left ventricular function  -  Ambulatory referral/consult to Cardiology      Plan and Discussion    Regarding her coronary calcification, needs continued risk factor management.  Her daughter tells me that her blood pressure control is improving as his her lipid control.  There is no indication for an ischemic evaluation.  She would not be a candidate for any type of revascularization.  Needs continued medical therapy for her risk factors.    The ASCVD Risk score (Brookesmith DK, et al., 2019) failed to calculate for the following reasons:    The 2019 ASCVD risk score is only valid for ages 40 to 79     Follow Up  Follow up if symptoms worsen or fail to improve.      Bryan Mejía MD         [1]   Current Outpatient Medications   Medication Sig Dispense Refill    albuterol (ACCUNEB) 0.63 mg/3 mL Nebu Take 0.63 mg by nebulization every 6 (six) hours as needed.      alendronate (FOSAMAX) 70 MG tablet Take 1 tablet (70 mg total) by mouth every 7 days. 12 tablet 3    amLODIPine (NORVASC) 5 MG tablet Take 5 mg by mouth once daily.       buspirone HCl (BUSPAR ORAL)       calcipotriene (DOVONOX) 0.005 % cream Apply topically 2 (two) times daily as needed.      fluticasone-umeclidin-vilanter (TRELEGY ELLIPTA) 200-62.5-25 mcg inhaler Inhale 1 puff into the lungs once daily. 60 each 11    irbesartan (AVAPRO) 75 MG tablet Take 75 mg by mouth once daily.      levothyroxine (SYNTHROID) 75 MCG tablet Take 1 tablet (75 mcg total) by mouth once daily. 90 tablet 3    memantine (NAMENDA) 10 MG Tab Take 10 mg by mouth.      pravastatin (PRAVACHOL) 40 MG tablet TAKE 1 TABLET BY MOUTH EVERY DAY 90 tablet 3    sertraline (ZOLOFT) 50 MG tablet Take 100 mg by mouth.      vitamin D (VITAMIN D3) 1000 units Tab Take 1,000 Units by mouth.      omeprazole (PRILOSEC) 20 MG capsule  (Patient not taking: Reported on 5/2/2025)       No current facility-administered medications for this visit.   [2]   Social History  Socioeconomic History    Marital status:    Tobacco Use     Smoking status: Former     Current packs/day: 0.00     Types: Cigarettes     Quit date: 1999     Years since quittin.7    Smokeless tobacco: Never   Substance and Sexual Activity    Alcohol use: No    Drug use: No    Sexual activity: Never     Social Drivers of Health     Financial Resource Strain: High Risk (3/5/2025)    Overall Financial Resource Strain (CARDIA)     Difficulty of Paying Living Expenses: Hard   Food Insecurity: No Food Insecurity (3/5/2025)    Hunger Vital Sign     Worried About Running Out of Food in the Last Year: Never true     Ran Out of Food in the Last Year: Never true   Transportation Needs: No Transportation Needs (3/5/2025)    PRAPARE - Transportation     Lack of Transportation (Medical): No     Lack of Transportation (Non-Medical): No   Physical Activity: Inactive (3/5/2025)    Exercise Vital Sign     Days of Exercise per Week: 0 days     Minutes of Exercise per Session: 0 min   Stress: Stress Concern Present (3/5/2025)    [employer] [place] [employer] - Occupational Stress Questionnaire     Feeling of Stress : To some extent   Housing Stability: Low Risk  (3/5/2025)    Housing Stability Vital Sign     Unable to Pay for Housing in the Last Year: No     Number of Times Moved in the Last Year: 1     Homeless in the Last Year: No

## 2025-05-19 DIAGNOSIS — G62.0 DRUG-INDUCED POLYNEUROPATHY: Primary | ICD-10-CM

## 2025-05-19 DIAGNOSIS — L60.2 ONYCHOGRYPOSIS: ICD-10-CM

## 2025-05-19 NOTE — PROGRESS NOTES
Subjective:      Patient ID: Adri Rice is a 79 y.o. female.    Chief Complaint: chronic pain, bilateral hip pain    HPI: 79 y.o. female with PMHx of psoriasis, OA, fibromyalgia, asthma, s/p near total thyroidectomy in 1979 for non toxic multinodular goiter, hyperparathyroidism s/p parathyroidectomy, breast cancer right breast (dx in 2011 s/p lumpectomy, radiation/chemo), HTN and dementia presents to clinic with daughter for establishment of care. She was referred by PCP for ongoing joint pain.     Former patient of Dr. Goodman, Dr. Zaragoza and Dr. Recio. LOV with Dr. Recio 8/2021, she felt pt's diffuse body pain was consistent with fibromyalgia. She advised her to continue gabapentin 100mg po BID and start baclofen 10mg po qhs and referred her to PMR. She reports being diagnosed with psoriasis in 1999 - had rash in scalp and legs. Reports she has some psoriasis in her back, ear, and stomach. She was diagnosed with fibromyalgia around 2011 (by Dr. Goodman)  following a dx of R breast cancer. She was not on any aromatase inhibitors--just chemo and radiation rx. She developed aching all over and had an imaging study ? NM scan and was told she had generalized OA.  Dr. Goodman put her on wellbutrin and she had problems with it and stopped it.    Patient developed dementia in 8/2024, the condition has rapidly progressed over the past year. She is a poor historian, history obtained via chart review and patient's daughter, present during visit. Daughter reports pt has been dealing with chronic pain for at least 10 yrs (involving shoulders, hands, wrist, lower back, hips, knees and ankles) w/ occasional swelling of the Rt hand. She had cervical inj 2 yrs ago, states made sxs worsening. Most recently, in April, had steroid inj w/ Ortho for acute on chronic Rt pain - symptoms have improved. Also, c/o neuropathy for past 3 yrs, had Rt hand CTS release but still having tingling of Rt 2nd-5th finger tips w/  "swelling. 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 Daughter states since pt's  dementia dx, symptoms have been difficult to assess.     Recent complaint is bilateral hip pain (L>R). Started in April, worsening over past 2-3 weeks. Daughter doesn't recall but she saw Ortho in 2016 for similar complaint and received b/l hip inj for greater trochanteric bursitis. Daughter attributes recent worsening to possible prolonged sitting at adult day care (there from 9am - 3pm) and then bus ride home.     In the past she was on gabapentin, Tramadol and Baclofen (daily) but stopped d/t SE and being ineffective. She has been off prescription pain meds x1 yr. Currently uses lidocaine patches and alternating Tylenol and Ibuprofen prn. Hasn't use in 1 week. Currently doing PT and OT at center but daughter said its unclear if actually getting therapy there. She has a pending referral for PT/OT.       Rheum ROS:  No fevers or chills  + unintentional weight loss ~ 15 lbs over past 2 months; gained some back   No malar rash or photosensitivity   + psoriasis; in the past on back, stomach, hands, scalp; currently on back and stomach - not active in past 3 weeks; on topical agents in the past; has Derm appt next month  No oral ulcers    No genital ulcers  + dry eyes; uses OTC drops  No dry mouth   No recurrent conjunctivitis or uveitis or scleritis or episcleritis  No raynaud's phenomenon  No CP  + SOB; intermittent related to asthma  No dysphagia   +Coughing and choking w/ fluids and solids - has appt for swallow eval today  No raynaud's   No digital ulcers   No bloody diarrhea  No focal weakness  +Urinary issues: 3 UTIs in past 6 months  + joint pain (as above)  + AM stiffness      History  Social: denies   Family: Mother had RA  Worked on [employer] for 25 yrs; retired    Objective:   /77 (Patient Position: Sitting)   Pulse 65   Ht 5' 3" (1.6 m)   Wt 64 kg (141 lb 1.5 oz)   LMP  (LMP " Unknown)   BMI 24.99 kg/m²   Physical Exam   Constitutional: No distress. She does not appear ill.   HENT:   Head: Normocephalic and atraumatic.   Right Ear: External ear normal.   Left Ear: External ear normal.   Mouth/Throat: Mucous membranes are moist. Oropharynx is clear.   Eyes: Conjunctivae are normal. Right eye exhibits no discharge. Left eye exhibits no discharge. No scleral icterus.   Cardiovascular: Normal rate, regular rhythm and normal pulses.   Pulmonary/Chest: No respiratory distress.   Abdominal: She exhibits no distension.   Musculoskeletal:         General: Tenderness present. No swelling, deformity or signs of injury.      Right lower leg: Edema present.      Left lower leg: Edema present.      Comments: Shoulder  Elbows: no synovitis or tenderness  Hands: mild bony hypertrophy of Rt hand, possible swelling of Rt 2-4th MCPs? Difficult to assess. Left hand w/ no synovitis  Wrists: no synovitis or ttp  Hips: possible ttp of lateral and posterior aspect (unclear d/t dementia); no pain or limited ROM with internal and ext rotation   Ankles: ttp  Feet: no complaints   Skin: No lesion noted.   Hyperpigmented lesions on paraspinal region and abdomen (scarring from psoriasis)      5/20/2025   Tender (CANO-28) 3 / 28    Swollen (CANO-28) 0 / 28    Provider Global 0 / 100   Patient Global 0 / 100   ESR --   CRP 2.8 mg/L   CANO-28 (ESR) --   CANO-28 (CRP) 2.41 (Remission)   CDAI Score 3         Assessment and plan:     79 y.o. female with PMHx of psoriasis, OA, fibromyalgia, asthma, s/p near total thyroidectomy in 1979 for non toxic multinodular goiter, hyperparathyroidism s/p parathyroidectomy, breast cancer right breast (dx in 2011 s/p lumpectomy, radiation/chemo), HTN and dementia presents to clinic with daughter for complaint of joint pain, recently her bilateral hips. Former patient of Dr. Goodman, Dr. Zaragoza and Dr. Recio. Given her dementia, it's difficult to assess pain during visit. She has bony  hypertrophy and possibly mild swelling of Rt hand MCPs but no complaint of pain the joints. Her primary complaint is tingling of Rt 2nd-5th finger tips. She does have a high titer TRUMAN >1:2560 with no clear evidence of a CTD. And no evidence to suggest PsA.    Polyarthralgia  Bilateral hip pain   RF and CCP neg. TRUMAN + (as below)  - Obtain XR bilateral hips and Arthritis survey   - Check inflammation makers, RF, CCP, CK  - C/w PT/OT  - Defer rechecking PTH and vitamin D until follow up visit with Endo soon    TRUMAN+  2021: TRUMAN+ >1:2560, centromere. ALINA neg. c4, c4 normal. RF and CCP neg  CBC and PLT normal. Mild anemia in the past, has been stable. CPK nl in Feb  No clear evidence of a CTD at this time  - Check dsdna, c3, c4, UA/UPCR, urine cx (given frequent recurrent UTIs)    Osteopenia  Primary hyperparathyroidism  Vitamin D deficiency  - on DEXA, last 2/2024; repeat due 2/2026  - on vitamin 2000 IU a day  - Follows w/ Endo     This patient was examined with Dr. Goins. Plan discussed with the patient. Return to clinic depending on lab studies and imaging      Problem List Items Addressed This Visit       Vitamin D deficiency    Osteopenia    Primary hyperparathyroidism    Arthropathic psoriasis, unspecified     Other Visit Diagnoses         TRUMAN positive    -  Primary    Relevant Orders    Sedimentation rate    C-Reactive Protein    Direct antiglobulin test    Protein/Creatinine Ratio, Urine    Urinalysis    ANTI-DNA ANTIBODY, DOUBLE-STRANDED    C3 Complement (Completed)    C4 Complement (Completed)      Polyarthralgia        Relevant Orders    Sedimentation rate    C-Reactive Protein    Direct antiglobulin test    CBC Auto Differential    Comprehensive Metabolic Panel    ANTI-DNA ANTIBODY, DOUBLE-STRANDED    C3 Complement (Completed)    C4 Complement (Completed)    X-Ray Hips Bilateral 2 View Inc AP Pelvis    CK (Completed)    Cyclic Citrullinated Peptide Antibody, IgG    XR Arthritis Survey    RHEUMATOID FACTOR       Bilateral hip pain        Relevant Orders    X-Ray Hips Bilateral 2 View Inc AP Pelvis      Frequent UTI        Relevant Orders    Urine Culture High Risk          Ana Moncada MD  PGY-4, Rheumatology Fellow

## 2025-05-20 ENCOUNTER — RESULTS FOLLOW-UP (OUTPATIENT)
Dept: RHEUMATOLOGY | Facility: CLINIC | Age: 80
End: 2025-05-20

## 2025-05-20 ENCOUNTER — CLINICAL SUPPORT (OUTPATIENT)
Dept: SPEECH THERAPY | Facility: HOSPITAL | Age: 80
End: 2025-05-20
Attending: NURSE PRACTITIONER
Payer: COMMERCIAL

## 2025-05-20 ENCOUNTER — OFFICE VISIT (OUTPATIENT)
Dept: RHEUMATOLOGY | Facility: CLINIC | Age: 80
End: 2025-05-20
Payer: COMMERCIAL

## 2025-05-20 ENCOUNTER — HOSPITAL ENCOUNTER (OUTPATIENT)
Dept: RADIOLOGY | Facility: HOSPITAL | Age: 80
Discharge: HOME | End: 2025-05-20
Attending: NURSE PRACTITIONER
Payer: COMMERCIAL

## 2025-05-20 VITALS
BODY MASS INDEX: 25.01 KG/M2 | DIASTOLIC BLOOD PRESSURE: 77 MMHG | HEART RATE: 65 BPM | WEIGHT: 141.13 LBS | HEIGHT: 63 IN | SYSTOLIC BLOOD PRESSURE: 136 MMHG

## 2025-05-20 DIAGNOSIS — R13.10 DIFFICULTY IN SWALLOWING: ICD-10-CM

## 2025-05-20 DIAGNOSIS — M25.50 POLYARTHRALGIA: ICD-10-CM

## 2025-05-20 DIAGNOSIS — M85.80 OSTEOPENIA, UNSPECIFIED LOCATION: ICD-10-CM

## 2025-05-20 DIAGNOSIS — E21.0 PRIMARY HYPERPARATHYROIDISM: ICD-10-CM

## 2025-05-20 DIAGNOSIS — M25.552 BILATERAL HIP PAIN: ICD-10-CM

## 2025-05-20 DIAGNOSIS — R13.13 PHARYNGEAL DYSPHAGIA: Primary | ICD-10-CM

## 2025-05-20 DIAGNOSIS — R76.8 ANA POSITIVE: Primary | ICD-10-CM

## 2025-05-20 DIAGNOSIS — N39.0 FREQUENT UTI: ICD-10-CM

## 2025-05-20 DIAGNOSIS — E55.9 VITAMIN D DEFICIENCY: ICD-10-CM

## 2025-05-20 DIAGNOSIS — M25.551 BILATERAL HIP PAIN: ICD-10-CM

## 2025-05-20 DIAGNOSIS — L40.9 PSORIASIS: ICD-10-CM

## 2025-05-20 PROBLEM — L40.50 ARTHROPATHIC PSORIASIS, UNSPECIFIED: Status: ACTIVE | Noted: 2025-05-20

## 2025-05-20 PROCEDURE — 25500020 PHARM REV CODE 255: Performed by: NURSE PRACTITIONER

## 2025-05-20 PROCEDURE — 99205 OFFICE O/P NEW HI 60 MIN: CPT | Mod: GC,S$GLB,, | Performed by: STUDENT IN AN ORGANIZED HEALTH CARE EDUCATION/TRAINING PROGRAM

## 2025-05-20 PROCEDURE — A9698 NON-RAD CONTRAST MATERIALNOC: HCPCS | Performed by: NURSE PRACTITIONER

## 2025-05-20 PROCEDURE — 99999 PR PBB SHADOW E&M-EST. PATIENT-LVL IV: CPT | Mod: PBBFAC,GC,, | Performed by: STUDENT IN AN ORGANIZED HEALTH CARE EDUCATION/TRAINING PROGRAM

## 2025-05-20 PROCEDURE — 74230 X-RAY XM SWLNG FUNCJ C+: CPT | Mod: TC

## 2025-05-20 PROCEDURE — 74220 X-RAY XM ESOPHAGUS 1CNTRST: CPT | Mod: TC

## 2025-05-20 PROCEDURE — 74220 X-RAY XM ESOPHAGUS 1CNTRST: CPT | Mod: 26,,, | Performed by: RADIOLOGY

## 2025-05-20 PROCEDURE — 92611 MOTION FLUOROSCOPY/SWALLOW: CPT | Mod: GN

## 2025-05-20 RX ADMIN — BARIUM SULFATE 100 ML: 0.81 POWDER, FOR SUSPENSION ORAL at 02:05

## 2025-05-20 RX ADMIN — BARIUM SULFATE 150 ML: 0.6 SUSPENSION ORAL at 02:05

## 2025-05-20 ASSESSMENT — ROUTINE ASSESSMENT OF PATIENT INDEX DATA (RAPID3)
PATIENT GLOBAL ASSESSMENT SCORE: 4.5
MDHAQ FUNCTION SCORE: 0.2
AM STIFFNESS SCORE: 1, YES
PSYCHOLOGICAL DISTRESS SCORE: 3.3
TOTAL RAPID3 SCORE: 4.22
FATIGUE SCORE: 4.5
PAIN SCORE: 7.5

## 2025-05-20 NOTE — PROGRESS NOTES
Referring provider: Brenda Jean  Reason for visit: Modified barium swallow study (CPT 10873)  Date: 5/20/2025  Purpose: to evaluate anatomy and physiology of the oropharyngeal swallow, to determine effectiveness of rehabilitation strategies, and to determine diet consistency and intervention recommendations.    Subjective / History    Adri Rice is a 79 y.o. female referred by TRAE Jean for Modified Barium Swallow Study (09631). She presents with her daughter who provided the case hx d/t the patient's severe memory deficits. Her daughter reports that for about 2-3 weeks in April, the patient experienced coughing fits during meals with either sips of thin liquid or on the 2nd or 3rd bite of solid consistencies. The daughter reports that this was a daily occurrence, and that the patient would often panic and request help during the coughing fits. She denies choking on food or liquid. The daughter reports that sometimes the coughing fits would be so intense that the patient would experience emesis and would cough up mostly mucous. The patient would also report constant stomach pain even outside of meals, and experience frequent eructation. The daughter reports that the patient often was woken from sleep with coughing. The daughter notes that the patient often would not chew her food well before swallowing, tossed her head back while drinking liquids, and seemed to inhale while swallowing. She denies any alleviating factors; however, the daughter reports that all these symptoms are improving in the last week, and the patient is not coughing during meals nearly as much. The patient has a pmhx significant for Alzheimer's dementia (diagnosed in Dec 2024), GERD, HTN, hypothyroidism, fibromyalgia, SOB from asthma, and chemotherapy to treat breast cancer. Social hx: she is a former smoker and does not drink per EMR. She has good support from her daughter and receives care at the [facility].    Current diet:  regular consistencies with thin liquids  Diet modification as a result of this problem: has aversion to yogurt  History of unintentional weight loss: yes, lost 6-7 lbs over the 2 weeks she was coughing. Has gained about 5 back in the last week or so  History of aspiration pneumonia: none, but chronic community acquired pneumonia and bronchitis   History of needing Heimlich maneuver: none    Relevant Imaging/Office Visits  Barium Esophagram- 5/20/25  Findings:  Esophagus: The esophagus is normal in contour and caliber without evidence of masses or strictures. Mild esophageal dysmotility characterized by proximal escape and few tertiary contractions.   Mekhi Mary MD     Past Medical History:   Diagnosis Date    Asthma     Breast cancer     Fibromyalgia     Hypertension     Psoriasis vulgaris      Past Surgical History:   Procedure Laterality Date    APPENDECTOMY  1962    BREAST LUMPECTOMY  7-    breast cancer    BUNIONECTOMY  2010    CORONARY ANGIOGRAPHY  2014    no obstructive CAD    EPIDURAL STEROID INJECTION N/A 8/11/2020    Procedure: LUMBAR L2/3 NANCY DIRECT REFERRAL;  Surgeon: Ion Bearden MD;  Location: [facility];  Service: Pain Management;  Laterality: N/A;  NEEDS CONSENT    KNEE ARTHROSCOPY Right 2004    SHOULDER SURGERY  1982    right/had a bone infectioni    THYROIDECTOMY  1977    hyperparathyroid&thyroidectomy 2006    TOTAL KNEE ARTHROPLASTY  10/13/2014     Medications Ordered Prior to Encounter[1]    Objective   Behavior  The patient presented with normal affect and social interaction. She was able to fully cooperate during the study. Results of today's assessment were considered indicative of her current levels of swallowing functioning. She displayed severe memory deficits, and would often forget instructions as quickly as 30 seconds after they were given.      Hearing  Subjectively, hard of hearing. She asked for repetitions frequently.      Oral Mech   A cranial nerve evaluation  revealed:     Cranial Nerve 5: Trigeminal Nerve  Motor Jaw Posture at rest: Closed  Mandible Elevation/Depression: WFL  Mandible lateralization: WFL  Abnormal movement: absent Interpretation: Intact bilaterally    Sensory Forehead: WFL  Cheek: WFL  Jaw: WFL  Facial Pain: None noted Interpretation: Intact bilaterally      Cranial Nerve 7: Facial Nerve  Motor Facial Symmetry: WNL  Wrinkle Forehead: WFL  Close eyes tightly: WFL  Labial Protrusion: WFL  Labial Retraction: WFL  Buccal Strength with Labial Seal: WFL  Abnormal movement: absent Interpretation: Intact bilaterally    Sensory Formal testing not completed.       Cranial Nerves IX and X: Glossopharyngeal and Vagus Nerves  Motor Palatal Symmetry (Rest): WNL  Palatal Symmetry (Movement): WNL  Cough: Perceptually strong  Voice Prior to PO intake: Clear  Resonance: Normal  Abnormal movement: absent Interpretation: Intact bilaterally      Cranial Nerve XII: Hypoglossal Nerve  Motor Tongue at rest: WNL  Lingual Protrusion: WNL  Lingual Protrusion against Resistance: WNL  Lingual Lateralization: WNL  Abnormal movement: absent Interpretation: Intact bilaterally      Other information:  Volitional Swallow: Able to palpate laryngeal rise  Mucosal Quality: No abnormal findings  Secretion Management: no overt deficits noted/observed  Dentition: Good condition for speech and mastication        Test Findings  The patient was seen in Radiology with the radiology technician (Radiologist available for in-person input during a study as needed) for a Modified Barium Swallow Study. She was seated on a stool for left lateral videofluoroscopic views.     Consistencies assessed / method of administration  - x2 sip thin liquid/cup sip  - x3 sips thin liquid/sequential cup sips  - x2 sips nectar thick liquid  - x2 applesauce/spoon  - x2 pudding/spoon  - x2 bites cracker  - Barium tablet w/ water    Oral phase  The patient was able to obtain liquid and strip utensils adequately with no  loss of material from the oral cavity. She moved boluses through the oral cavity with appropriate transit time. There was no pooling of liquids in the mouth. Swallow reflex was triggered mildly delayed when the bolus reached the valleculae.    Pharyngeal phase  Boluses moved through the pharyngeal phase with no laryngeal penetration, aspiration, or nasal regurgitation. The patient demonstrated inconsistent incoordination of the swallow timing, most notable on the first trial of nectar thick liquid where she prematurely spilled the majority of the bolus into the pharynx before initiating the swallow, resulting in significant aerophagia with subsequent eructation. Although no penetration or aspiration was seen on today's study, this behavior could have been causing penetration or aspiration during the period of time where the patient was experiencing coughing during meals.   - Delayed initiation  - Adequate soft palate elevation  - Adequate laryngeal elevation and anterior hyoid excursion  - Very mildly reduced tongue base retraction  - Complete epiglottic inversion  - Complete laryngeal vestibular closure  - Adequate pharyngeal stripping wave  - Adequate PE segment opening.  - Trace pharyngeal residue in BOT only after trials of solid bolus    Esophageal phase (screen)  Boluses entered the upper esophagus without any evident problems. No obstruction was noted. Barium esophagram was completed during the same visit today, see above for results.     Strategies/therapeutic interventions attempted  Multiple swallows per bolus were effective in decreasing/eliminating risk for BOT residue.     Rosenbek Penetration-Aspiration Scale (Rosenbek et al 1996)  Best Trial: 1. Contrast does not enter airway.   Worst Trial: 1. Contrast does not enter airway.     Assessment / Impressions   The results of this MBSS indicate that patient demonstrates oropharyngeal swallowing function appropriate for a full PO diet w/o significant  restriction. No aspiration or penetration observed. The patient demonstrated inconsistent incoordination of the swallow timing, most notable on the first trial of nectar thick liquid where she prematurely spilled the majority of the bolus into the pharynx before initiating the swallow, resulting in significant aerophagia with subsequent eructation. Although no penetration or aspiration was seen on today's study, this behavior could have been causing penetration or aspiration during the period of time where the patient was experiencing coughing during meals. This is further substantiated by the daughter's report that the patient often would not chew her food well before swallowing, tossed her head back while drinking liquids, and seemed to inhale while swallowing.  Dysphagia Outcome and Severity Scale (INDIA): Level 6: WFL/ Modified [place].   Dynamic Imaging Grade of Swallowing Toxicity (DIGEST): preserved safety of swallow and preserved efficiency of swallow.  Hx of Alzheimer's dementia (diagnosed in Dec 2024) with significant memory deficits  Hx of GERD, HTN, hypothyroidism, fibromyalgia, SOB from asthma, and chemotherapy to treat breast cancer    Recommendations / POC   Diet recommendation: thin liquids (IDDSI 0) and regular consistencies (IDDSI 7).  Medications should be taken Whole in thin liquids.   Therapeutic technique: recommend small sips/bites, one bite/sip at a time, keep head level or perform chin tuck while swallowing, and multiple swallows per bolus   Maintain upright seating position during meals and monitor for any signs/symptoms of aspiration (such as wet/gurgly voice that does not clear with coughing, inability to make any voice sounds, any persistent coughing with oral intake, otherwise unexplained fever, unexplained increased or new difficulty or discomfort breathing, unexplained increase in sleepiness/lethargy/significant fatigue, unexplained increase or new onset confusion or change in  cognitive functioning, or any other unexplained change in health or well-being that could be related to swallowing).  Adhere to behavorial reflux precautions: remain upright for 1-2 hours after a meal; sleep with the head of the bed elevated; avoid tight fitting clothing; follow a bite of dense/dry foods with a warm liquid wash; take small bites/sips of food/liquid; and avoid known reflux inducing foods/drinks such as: fatty/greasy foods, spicy foods, acidic foods (including tomatoes), caffeinated beverages, carbonated beverages, or alcoholic beverages.   Dysphagia therapy is not indicated at this time.  Contact clinician or referring provider for repeat MBSS if swallowing status changes or worsens.    Sterling Kennedy MA, CCC-SLP       [1]   Current Outpatient Medications on File Prior to Visit   Medication Sig Dispense Refill    albuterol (ACCUNEB) 0.63 mg/3 mL Nebu Take 0.63 mg by nebulization every 6 (six) hours as needed.      alendronate (FOSAMAX) 70 MG tablet Take 1 tablet (70 mg total) by mouth every 7 days. 12 tablet 3    amLODIPine (NORVASC) 5 MG tablet Take 5 mg by mouth once daily.       buspirone HCl (BUSPAR ORAL)       calcipotriene (DOVONOX) 0.005 % cream Apply topically 2 (two) times daily as needed.      fluticasone-umeclidin-vilanter (TRELEGY ELLIPTA) 200-62.5-25 mcg inhaler Inhale 1 puff into the lungs once daily. 60 each 11    irbesartan (AVAPRO) 75 MG tablet Take 75 mg by mouth once daily.      levothyroxine (SYNTHROID) 75 MCG tablet Take 1 tablet (75 mcg total) by mouth once daily. 90 tablet 3    memantine (NAMENDA) 10 MG Tab Take 10 mg by mouth.      omeprazole (PRILOSEC) 20 MG capsule       pravastatin (PRAVACHOL) 40 MG tablet TAKE 1 TABLET BY MOUTH EVERY DAY 90 tablet 3    sertraline (ZOLOFT) 50 MG tablet Take 100 mg by mouth.      vitamin D (VITAMIN D3) 1000 units Tab Take 1,000 Units by mouth.       No current facility-administered medications on file prior to visit.

## 2025-05-21 NOTE — PROGRESS NOTES
Patient seen and examined with fellow.  All elements of history, physical exam and medical decision making independently confirmed by me. Very pleasant patient with dementia who presents with her daughter.  History of high titer TRUMAN, fibromyalgia and psoriasis.  Now with joint pains.  Will evaluate for inflammatory arthritis and other potential rheumatologic conditions.     See note for details.

## 2025-05-21 NOTE — PLAN OF CARE
Assessment / Impressions   The results of this MBSS indicate that patient demonstrates oropharyngeal swallowing function appropriate for a full PO diet w/o significant restriction. No aspiration or penetration observed. The patient demonstrated inconsistent incoordination of the swallow timing, most notable on the first trial of nectar thick liquid where she prematurely spilled the majority of the bolus into the pharynx before initiating the swallow, resulting in significant aerophagia with subsequent eructation. Although no penetration or aspiration was seen on today's study, this behavior could have been causing penetration or aspiration during the period of time where the patient was experiencing coughing during meals. This is further substantiated by the daughter's report that the patient often would not chew her food well before swallowing, tossed her head back while drinking liquids, and seemed to inhale while swallowing.  Dysphagia Outcome and Severity Scale (INDIA): Level 6: WFL/ Modified [place].   Dynamic Imaging Grade of Swallowing Toxicity (DIGEST): preserved safety of swallow and preserved efficiency of swallow.  Hx of Alzheimer's dementia (diagnosed in Dec 2024) with significant memory deficits  Hx of GERD, HTN, hypothyroidism, fibromyalgia, SOB from asthma, and chemotherapy to treat breast cancer    Recommendations / POC   Diet recommendation: thin liquids (IDDSI 0) and regular consistencies (IDDSI 7).  Medications should be taken Whole in thin liquids.   Therapeutic technique: recommend small sips/bites, one bite/sip at a time, keep head level or perform chin tuck while swallowing, and multiple swallows per bolus   Maintain upright seating position during meals and monitor for any signs/symptoms of aspiration (such as wet/gurgly voice that does not clear with coughing, inability to make any voice sounds, any persistent coughing with oral intake, otherwise unexplained fever, unexplained  increased or new difficulty or discomfort breathing, unexplained increase in sleepiness/lethargy/significant fatigue, unexplained increase or new onset confusion or change in cognitive functioning, or any other unexplained change in health or well-being that could be related to swallowing).  Adhere to behavorial reflux precautions: remain upright for 1-2 hours after a meal; sleep with the head of the bed elevated; avoid tight fitting clothing; follow a bite of dense/dry foods with a warm liquid wash; take small bites/sips of food/liquid; and avoid known reflux inducing foods/drinks such as: fatty/greasy foods, spicy foods, acidic foods (including tomatoes), caffeinated beverages, carbonated beverages, or alcoholic beverages.   Dysphagia therapy is not indicated at this time.  Contact clinician or referring provider for repeat MBSS if swallowing status changes or worsens.

## 2025-05-21 NOTE — PATIENT INSTRUCTIONS
Assessment / Impressions   The results of this MBSS indicate that patient demonstrates oropharyngeal swallowing function appropriate for a full PO diet w/o significant restriction. No aspiration or penetration observed. The patient demonstrated inconsistent incoordination of the swallow timing, most notable on the first trial of nectar thick liquid where she prematurely spilled the majority of the bolus into the pharynx before initiating the swallow, resulting in significant aerophagia with subsequent eructation. Although no penetration or aspiration was seen on today's study, this behavior could have been causing penetration or aspiration during the period of time where the patient was experiencing coughing during meals. This is further substantiated by the daughter's report that the patient often would not chew her food well before swallowing, tossed her head back while drinking liquids, and seemed to inhale while swallowing.  Dysphagia Outcome and Severity Scale (INDIA): Level 6: WFL/ Modified Burson.   Dynamic Imaging Grade of Swallowing Toxicity (DIGEST): preserved safety of swallow and preserved efficiency of swallow.  Hx of Alzheimer's dementia (diagnosed in Dec 2024) with significant memory deficits  Hx of GERD, HTN, hypothyroidism, fibromyalgia, SOB from asthma, and chemotherapy to treat breast cancer    Recommendations / POC   Diet recommendation: thin liquids (IDDSI 0) and regular consistencies (IDDSI 7).  Medications should be taken Whole in thin liquids.   Therapeutic technique: recommend small sips/bites, one bite/sip at a time, keep head level or perform chin tuck while swallowing, and multiple swallows per bolus   Maintain upright seating position during meals and monitor for any signs/symptoms of aspiration (such as wet/gurgly voice that does not clear with coughing, inability to make any voice sounds, any persistent coughing with oral intake, otherwise unexplained fever, unexplained  increased or new difficulty or discomfort breathing, unexplained increase in sleepiness/lethargy/significant fatigue, unexplained increase or new onset confusion or change in cognitive functioning, or any other unexplained change in health or well-being that could be related to swallowing).  Adhere to behavorial reflux precautions: remain upright for 1-2 hours after a meal; sleep with the head of the bed elevated; avoid tight fitting clothing; follow a bite of dense/dry foods with a warm liquid wash; take small bites/sips of food/liquid; and avoid known reflux inducing foods/drinks such as: fatty/greasy foods, spicy foods, acidic foods (including tomatoes), caffeinated beverages, carbonated beverages, or alcoholic beverages.   Dysphagia therapy is not indicated at this time.  Contact clinician or referring provider for repeat MBSS if swallowing status changes or worsens.

## 2025-05-22 ENCOUNTER — HOSPITAL ENCOUNTER (OUTPATIENT)
Dept: RADIOLOGY | Facility: HOSPITAL | Age: 80
Discharge: HOME | End: 2025-05-22
Attending: STUDENT IN AN ORGANIZED HEALTH CARE EDUCATION/TRAINING PROGRAM
Payer: COMMERCIAL

## 2025-05-22 DIAGNOSIS — M25.50 POLYARTHRALGIA: ICD-10-CM

## 2025-05-22 DIAGNOSIS — M25.552 BILATERAL HIP PAIN: ICD-10-CM

## 2025-05-22 DIAGNOSIS — M25.551 BILATERAL HIP PAIN: ICD-10-CM

## 2025-05-22 PROCEDURE — 77077 JOINT SURVEY SINGLE VIEW: CPT | Mod: TC

## 2025-05-22 PROCEDURE — 73521 X-RAY EXAM HIPS BI 2 VIEWS: CPT | Mod: 26,,, | Performed by: RADIOLOGY

## 2025-05-22 PROCEDURE — 77077 JOINT SURVEY SINGLE VIEW: CPT | Mod: 26,,, | Performed by: RADIOLOGY

## 2025-05-22 PROCEDURE — 73521 X-RAY EXAM HIPS BI 2 VIEWS: CPT | Mod: TC

## 2025-05-27 DIAGNOSIS — E21.0 PRIMARY HYPERPARATHYROIDISM: Primary | ICD-10-CM

## 2025-05-27 DIAGNOSIS — M81.8 OTHER OSTEOPOROSIS WITHOUT CURRENT PATHOLOGICAL FRACTURE: ICD-10-CM

## 2025-05-27 DIAGNOSIS — E89.0 POSTPROCEDURAL HYPOTHYROIDISM: ICD-10-CM

## 2025-05-27 DIAGNOSIS — M81.0 AGE-RELATED OSTEOPOROSIS WITHOUT CURRENT PATHOLOGICAL FRACTURE: ICD-10-CM

## 2025-06-09 ENCOUNTER — PATIENT MESSAGE (OUTPATIENT)
Dept: ORTHOPEDICS | Facility: CLINIC | Age: 80
End: 2025-06-09
Payer: COMMERCIAL

## 2025-06-10 ENCOUNTER — PATIENT OUTREACH (OUTPATIENT)
Dept: NEUROLOGY | Facility: CLINIC | Age: 80
End: 2025-06-10
Payer: COMMERCIAL

## 2025-06-10 NOTE — PROGRESS NOTES
Dementia Care Management        Date of Service: 06/10/2025  Care Navigator completing this form: Padmini Blnaca  PCP: Brenda Jean, NP    Patient: Adri Rice  MRN: [id number]  : 1945  Age: 79 y.o.  Gender: female  Race: Black or   Ethnicity: Not  or /a    Objective:   Patient and caregiver potentially eligible for [facility] dementia care management programs.    CTN/SW completed outreach attempt on 06/10/2025 to assess patient/caregiver interest in the program and screen for eligibility.       Outreach Outcome:   Incoming call from April (daughter) requesting information about the GUIDE Model. CTN and daughter talked about eligibility criteria for the GUIDE Model and the dyad's current needs. CTN explained that patient is ineligible for GUIDE due to insurance and zip code. Caregiver willing to consider dropping out of [facility], CTN offered guidance and suggested they establish care with Dr. Tyler first to try to address some of the current needs as they both benefit from [facility] for the most part. Caregiver agreed to this plan but requested CTN check copay and billing policies for this appointment with current insurance before moving forward with scheduling.     Experiencing some issues with [facility] currently due to patient being agitated at the day center. Per caregiver, [facility] is struggling to redirect and engage patient, have indicated that they do not have the staffing for individualized support during the day center hours. [facility] also stopped transportation service since patient struggles to stay seated and with seat belt on during the ride to the day center. Caregiver expressed feeling frustrated and worried as she needs the patient to be able to attend the day center in order to work. Caregiver is only daughter, limited support network.     Next steps: CTN messaged Dr. Tyler's staff for advice with insurance questions and plans to call caregiver back before  the end of this week. CTN encouraged caregiver to reach out as needed.

## 2025-06-16 ENCOUNTER — PATIENT OUTREACH (OUTPATIENT)
Dept: NEUROLOGY | Facility: CLINIC | Age: 80
End: 2025-06-16
Payer: COMMERCIAL

## 2025-06-16 NOTE — PROGRESS NOTES
Dementia Care Management        Date of Service: 2025  Care Navigator completing this form: Padmini Blanca  PCP: Brenda Jean, NP    Patient: Adri Rice  MRN: [id number]  : 1945  Age: 79 y.o.  Gender: female  Race: Black or   Ethnicity: Not  or /a      Outreach Outcome:   Spoke with April (daughter) who expressed her interest in establishing care with Dr. Tyler and a new day center as part of the Medicaid [facility] waiver. Caregiver reported that the patient was discharged from the [facility] adult day center due to needing more redirection and guidance compared to some of her peers. Caregiver is frustrated about current situation and worried about patient's care. Patient has an appointment with [facility] Provider tomorrow to discuss medication adjustments. Caregiver thinking about switching patient to another day program and insurance since she is no longer getting this benefit from [facility]. Caregiver interested in the GUIDE Model, CTN again shared criteria and program benefits.     Next steps: CTN called PCP's office to obtain order for Dr. Tyler's clinic, waiting for provider to place order which will be sent to CTN's email. CTN called [facility] to confirm their participation in the [facility] waiver as a center option, CTN left contact information and will be called back. Caregiver will call Medicare to review insurance options. CTN and caregiver are scheduled to check in again tomorrow at 3 pm after patient has seen [facility] provider.

## 2025-06-17 ENCOUNTER — PATIENT OUTREACH (OUTPATIENT)
Dept: NEUROLOGY | Facility: CLINIC | Age: 80
End: 2025-06-17
Payer: COMMERCIAL

## 2025-06-17 NOTE — PROGRESS NOTES
Dementia Care Management        Date of Service: 2025  Care Navigator completing this form: Padmini Blanca  PCP: Brenda Jean, NP    Patient: Adri Rice  MRN: [id number]  : 1945  Age: 79 y.o.  Gender: female  Race: Black or   Ethnicity: Not  or /a      Outreach Outcome:   Spoke with April (daughter) about updates in patient's care:    - Patient was seen by [facility] psychiatrist who recommended against placement and adjusted Buspar to help with agitation. Per caregiver, patient was on a low dose of Buspar and they will now increase. Caregiver was encouraged to try medication and bring patient back to the day center in two weeks. Caregiver is frustrated and sad about how things were managed at [facility], CTN advised caregiver to try meds and cool down before making a decision about day center.  - Caregiver also met with [facility] clinician who agreed that patient is not at nursing home level of care. Clinician received CTN's request for an order to see Dr. Ada Tyler at [facility].   - CTN found information about [facility] accepting Medicaid waiver [facility].   - Caregiver called Medicare to inquire about traditional medicare insurance and CCW, patient on wait list since 2024.  - Caregiver is appreciative of the support provided by this CTN and reported feeling in better spirits today.    Action Items: CTN to follow up on referral order for [facility], and check in with caregiver at the end of next week to assess behavioral changes after med adjustment.     Next steps: CTN called PCP's office to obtain order for Dr. Tyler's clinic, waiting for provider to place order which will be sent to CTN's email. CTN called [facility] to confirm their participation in the [facility] waiver as a center option, CTN left contact information and will be called back. Caregiver will call Medicare to review insurance options. CTN and caregiver are scheduled to check in again tomorrow at 3 pm after  patient has seen [facility] provider.

## 2025-06-26 ENCOUNTER — OFFICE VISIT (OUTPATIENT)
Dept: PAIN MEDICINE | Facility: CLINIC | Age: 80
End: 2025-06-26
Attending: ANESTHESIOLOGY
Payer: COMMERCIAL

## 2025-06-26 VITALS
RESPIRATION RATE: 19 BRPM | BODY MASS INDEX: 24.96 KG/M2 | WEIGHT: 140.88 LBS | DIASTOLIC BLOOD PRESSURE: 61 MMHG | SYSTOLIC BLOOD PRESSURE: 157 MMHG | HEIGHT: 63 IN | OXYGEN SATURATION: 96 % | HEART RATE: 54 BPM

## 2025-06-26 DIAGNOSIS — M54.12 CERVICAL RADICULOPATHY: ICD-10-CM

## 2025-06-26 DIAGNOSIS — G56.01 CARPAL TUNNEL SYNDROME OF RIGHT WRIST: Primary | ICD-10-CM

## 2025-06-26 PROCEDURE — 99999 PR PBB SHADOW E&M-EST. PATIENT-LVL V: CPT | Mod: PBBFAC,,, | Performed by: ANESTHESIOLOGY

## 2025-06-26 PROCEDURE — 99214 OFFICE O/P EST MOD 30 MIN: CPT | Mod: GC,S$GLB,, | Performed by: ANESTHESIOLOGY

## 2025-06-26 NOTE — PROGRESS NOTES
Chronic Pain - New Consult    PCP: Brenda Jean NP    REFERRING PHYSICIAN: No ref. provider found    History of Present Illness    CHIEF COMPLAINT:  Hand numbness and tingling sensation    HPI:  Patient presents for follow-up evaluation of right shoulder and hand numbness.    She recently saw an orthopedic surgeon at [facility] who administered a subacromial deltoid injection, which significantly helped with shoulder pain.      Her main complaint is hand numbness and tingling sensation, numbness primarily in the hand. She has a history of carpal tunnel syndrome and underwent a CTS release about five years ago but continued to have some symptoms. She reports ongoing numbness in the hand without neck pain.    An MRI from 2023 revealed canal stenosis at C3-C4, although she is not complaining of significant neck pain. Dr. Sid Nelson at [facility] performed an epidural injection in the neck, which reportedly worsened some pain. Her daughter mentions that after the neck injection, she had associated pain for months.  She previously tried gabapentin for shoulder pain which did not help and thus she stopped it.  Her overall hand pain is mild and comes and goes.    She is scheduled to begin occupational therapy at [facility]. She has Alzheimer's and was recently started on Sertraline and Buspar for anxiety and depression. She also has an upcoming neurology appointment next month.            6/26/2025    11:13 AM   Last 3 PDI Scores   Pain Disability Index (PDI) 23       INTERVAL HISTORY (Date):        (Newest visit at the top)           IMAGING:    MRI CERVICAL SPINE WITHOUT CONTRAST  Order: [id number]  Impression    ::    1.   Multilevel cervical spondylosis with moderate spinal canal stenosis at C4-C5 and mild to moderate spinal canal stenosis at C3-C4. Severe neuroforaminal narrowing at multiple levels as above.    Electronically Signed By: Manuelito Loyd MD 9/7/2023 7:32 PM CDT  Narrative    MRI CERVICAL SPINE WITHOUT  CONTRAST    INDICATION: Neck pain, chronic  Neck pain, chronic, degenerative changes on xray  rue progressive neuropathy    TECHNIQUE: This study was performed on the 1.5 Reyna high field MR unit. Multislice, multisequence images of the cervical spine were obtained in multiple projections without the use of I.V. contrast.    COMPARISON: Radiograph 8/28/2023    FINDINGS:  Alignment: Cranial the normal cervical lordosis. Trace anterolisthesis of C6 on C7. Mild anterolisthesis of C7 on T1 measuring 2 mm.    Bones: Vertebral body heights are maintained. No diffuse marrow replacement process. Degenerative endplate changes at C4-C5 with associated edema.    Cord: The cervical spinal cord is normal in caliber and signal.    C2-C3:  No significant spinal canal stenosis or neuroforaminal narrowing.    C3-C4:  Posterior disc osteophyte complex asymmetric to the right with uncovertebral spurring. Findings result in mild to moderate spinal canal stenosis with effacement of anterior thecal sac and mild flattening of the cord as well as severe right and mild left neuroforaminal narrowing.    C4-C5:  Posterior disc ossified complex, uncovertebral spurring, and facet hypertrophy results in moderate spinal canal stenosis and severe bilateral neuroforaminal narrowing.    C5-C6:  Posterior disc osteophyte complex asymmetric to the left and uncovertebral spurring. Findings result in mild spinal canal stenosis with effacement of the anterior thecal sac as well as moderate right and severe left neuroforaminal narrowing.    C6-C7: Posterior disc osteophyte complex, facet hypertrophy, and uncovertebral spurring results in severe right and mild left neuroforaminal narrowing. No significant spinal canal stenosis.    C7-T1: Small posterior disc osteophyte complex and facet hypertrophy contribute to at least mild bilateral neuroforaminal narrowing. No significant spinal canal stenosis.  Exam End: 09/07/23 14:40    Specimen Collected: 09/07/23  19:22 Last Resulted: 09/07/23 19:32   Received From: [facility]  Result Received: 01/23/24 10:22         Past Medical History:   Diagnosis Date    Asthma     Breast cancer     Fibromyalgia     Hypertension     Psoriasis vulgaris      Past Surgical History:   Procedure Laterality Date    APPENDECTOMY  1962    BREAST LUMPECTOMY  7-    breast cancer    BUNIONECTOMY  2010    CORONARY ANGIOGRAPHY  2014    no obstructive CAD    EPIDURAL STEROID INJECTION N/A 8/11/2020    Procedure: LUMBAR L2/3 NANCY DIRECT REFERRAL;  Surgeon: Ion Bearden MD;  Location: [facility];  Service: Pain Management;  Laterality: N/A;  NEEDS CONSENT    KNEE ARTHROSCOPY Right 2004    SHOULDER SURGERY  1982    right/had a bone infectioni    THYROIDECTOMY  1977    hyperparathyroid&thyroidectomy 2006    TOTAL KNEE ARTHROPLASTY  10/13/2014     Social History[1]  Family History   Problem Relation Name Age of Onset    Heart disease Mother      Hypertension Mother      Heart disease Father      Hypertension Father      Diabetes Brother      Hypertension Brother      Heart disease Brother      Kidney disease Brother      Skin cancer Neg Hx      Psoriasis Neg Hx         Review of patient's allergies indicates:   Allergen Reactions    Atorvastatin Other (See Comments)    Rosuvastatin Other (See Comments)     Felt vibration/myalgia     Sulfamethoxazole-trimethoprim Swelling, Diarrhea and Other (See Comments)       Current Outpatient Medications   Medication Sig    albuterol (ACCUNEB) 0.63 mg/3 mL Nebu Take 0.63 mg by nebulization every 6 (six) hours as needed.    alendronate (FOSAMAX) 70 MG tablet Take 1 tablet (70 mg total) by mouth every 7 days.    amLODIPine (NORVASC) 5 MG tablet Take 5 mg by mouth once daily.     buspirone HCl (BUSPAR ORAL)     calcipotriene (DOVONOX) 0.005 % cream Apply topically 2 (two) times daily as needed.    fluticasone-umeclidin-vilanter (TRELEGY ELLIPTA) 200-62.5-25 mcg inhaler Inhale 1 puff into the lungs once  "daily.    irbesartan (AVAPRO) 75 MG tablet Take 75 mg by mouth once daily.    levothyroxine (SYNTHROID) 75 MCG tablet Take 1 tablet (75 mcg total) by mouth once daily.    memantine (NAMENDA) 10 MG Tab Take 10 mg by mouth.    omeprazole (PRILOSEC) 20 MG capsule     pravastatin (PRAVACHOL) 40 MG tablet TAKE 1 TABLET BY MOUTH EVERY DAY    sertraline (ZOLOFT) 50 MG tablet Take 100 mg by mouth.    vitamin D (VITAMIN D3) 1000 units Tab Take 1,000 Units by mouth.     No current facility-administered medications for this visit.           ROS:  GENERAL: No fever. No chills. No fatigue. Denies weight loss. Denies weight gain.  HEENT: Denies headaches. Denies vision change. Denies eye pain. Denies double vision. Denies ear pain.   CV: Denies chest pain.   PULM: Denies of shortness of breath.  GI: Denies constipation. No diarrhea. No abdominal pain. Denies nausea. Denies vomiting. No blood in stool.  HEME: Denies bleeding problems.  : Denies urgency. No painful urination. No blood in urine.  MS: Denies joint stiffness. Denies joint swelling.   SKIN: Denies rash.   NEURO: Denies seizures. No weakness.  PSYCH:  Denies difficulty sleeping. + anxiety, + depression. No suicidal thoughts.       VITALS:   Vitals:    06/26/25 1113   BP: (!) 157/61   Pulse: (!) 54   Resp: 19   SpO2: 96%   Weight: 63.9 kg (140 lb 14 oz)   Height: 5' 3" (1.6 m)   PainSc:   2   PainLoc: Shoulder         PHYSICAL EXAM:   GENERAL: Well appearing, in no acute distress, alert and oriented x3.  PSYCH:  Mood and affect appropriate.  SKIN: Skin color, texture, turgor normal, no rashes or lesions.  HEENT:  Normocephalic, atraumatic. Cranial nerves grossly intact.  NECK:   Negative for pain to palpation over the cervical paraspinous muscles.   Negative for pain to palpation over facets.  Negative for pain with neck flexion, extension, or lateral flexion.   Spurling test was Negative Bilaterally  PULM: No evidence of respiratory difficulty, symmetric chest " rise.  GI:  Non-distended  EXTREMITIES:   No deformities, edema, or skin discoloration.   Tinels at wrist and CT compression worsens R hand numbness  Tinel at elbow negative  NEURO: Sensation is equal and appropriate bilaterally. Bilateral upper and lower extremity strength is normal and symmetric. Bilateral upper and lower extremity coordination and muscle stretch reflexes are physiologic and symmetric. Plantar response are downgoing.   GAIT: Antalgic gait      ASSESSMENT: 79 y.o. year old with chronic right shoulder and right hand pain, consistent with:      1. Carpal tunnel syndrome of right wrist  EMG W/ ULTRASOUND AND NERVE CONDUCTION TEST 2 Extremities      2. Cervical radiculopathy              PLAN:    Patient Education:  Discussed the importance of physical therapy, activity modification, and a home exercise plan to help with pain and improve health.  Therapy referral:  patient to start OT soon  Medications:   Patient can continue with pain medications for now per their provider since they are providing benefits, using them appropriately, and without side effects.  Recommend her PCP change SSRI > SNRI to cover anxiety/depression with right hand neuropathy  Will obtain records from [facility] to see prior interventions  Schedule EMG to evaluate right hand CTS vs radiculopathy.  Patient has prior CTS s/p release 5 years ago  Follow up with orthopedics for right shoulder pain, recently received CSI with benefit  Follow up with Neurology  Counseled patient: regarding the importance of activity modification and physical therapy.  Return to clinic: as needed      Sophie Navas I have personally reviewed the history and exam of this patient and agree with the resident/fellow/NPs note as stated above.    Aleksey Farfan MD    06/26/2025         [1]   Social History  Socioeconomic History    Marital status:    Tobacco Use    Smoking status: Former     Current packs/day: 0.00     Types: Cigarettes     Quit  date: 1999     Years since quittin.9    Smokeless tobacco: Never   Substance and Sexual Activity    Alcohol use: No    Drug use: No    Sexual activity: Never     Social Drivers of Health     Financial Resource Strain: High Risk (3/5/2025)    Overall Financial Resource Strain (CARDIA)     Difficulty of Paying Living Expenses: Hard   Food Insecurity: No Food Insecurity (3/5/2025)    Hunger Vital Sign     Worried About Running Out of Food in the Last Year: Never true     Ran Out of Food in the Last Year: Never true   Transportation Needs: No Transportation Needs (3/5/2025)    PRAPARE - Transportation     Lack of Transportation (Medical): No     Lack of Transportation (Non-Medical): No   Physical Activity: Inactive (3/5/2025)    Exercise Vital Sign     Days of Exercise per Week: 0 days     Minutes of Exercise per Session: 0 min   Stress: Stress Concern Present (3/5/2025)    [employer] [place] [employer] - Occupational Stress Questionnaire     Feeling of Stress : To some extent   Housing Stability: Low Risk  (3/5/2025)    Housing Stability Vital Sign     Unable to Pay for Housing in the Last Year: No     Number of Times Moved in the Last Year: 1     Homeless in the Last Year: No

## 2025-06-26 NOTE — PATIENT INSTRUCTIONS
- given that patient has numbness in right hand and currently on an SSRI, would recommend switching to Cymbalta SNRI to help with anxiety/depression and nerve pain  - Recommend EMG nerve study to evaluate how much pain is coming from CTS vs Cervical Radiculopathy    - Follow up with Neurology, Orthopedics

## 2025-06-27 ENCOUNTER — PATIENT OUTREACH (OUTPATIENT)
Dept: NEUROLOGY | Facility: CLINIC | Age: 80
End: 2025-06-27
Payer: COMMERCIAL

## 2025-06-27 NOTE — PROGRESS NOTES
Dementia Care Management        Date of Service: 2025  Care Navigator completing this form: Padmini Blanca  PCP: Brenda Jean, NP    Patient: Adri Rice  MRN: [id number]  : 1945  Age: 79 y.o.  Gender: female  Race: Black or   Ethnicity: Not  or /a      Outreach Outcome:   Spoke with April (daughter) about updates in patient's care:    - Patient is doing better in the AM after Buspar med adjustment, caregiver also reports that the patient seems less depressed. Sundowning is starting earlier now than before, at around 2 pm, patient is active doing things around the house and becomes irritable. CTN encouraged caregiver to call [facility] psychiatry, who prescriped Buspar increase, to inform about behavior changes. Caregiver stated that she had already called because pain management recommended adding Cymbalta en removing Seroquel, to help manage behavior and neuropathic pain. CTN reinforced caregiver's efforts in reaching [facility] psych before changing any medications.   - Caregiver waiting on behavior to stabilize before considering a different adult day program, and still weighing the pros and cons of switching to Medicare A&B for the purposes of the GUIDE Model. CTN assured caregiver that no rush decisions should be made and stabilizing behavior should remain a priority for now.   - Patient was scheduled for an in-person visit with Dr. Tyler on , CTN messaged staff requesting dyad be added to a cancellation list if possible.     Action Items: CTN to follow up on referral order for [facility], and check in with caregiver at the end of next week to assess behavioral changes after med adjustment.     Next steps: PRN check in. Caregiver knows to reach CTN, as needed.

## 2025-06-30 ENCOUNTER — PROCEDURE VISIT (OUTPATIENT)
Facility: CLINIC | Age: 80
End: 2025-06-30
Payer: COMMERCIAL

## 2025-06-30 DIAGNOSIS — M54.12 CERVICAL RADICULOPATHY: Primary | ICD-10-CM

## 2025-06-30 DIAGNOSIS — G56.01 CARPAL TUNNEL SYNDROME OF RIGHT WRIST: ICD-10-CM

## 2025-06-30 NOTE — PROCEDURES
Name: Adri Rice  MRN: [id number]   CSN: [id number]      Date: 06/30/2025      History of Present Illness           Patient presented today for EMG and nerve conduction studies of bilateral upper extremities secondary to bilateral hand pain        Past Medical History: The patient  has a past medical history of Asthma, Breast cancer, Fibromyalgia, Hypertension, and Psoriasis vulgaris.    Social History: The patient  reports that she quit smoking about 25 years ago. Her smoking use included cigarettes. She has never used smokeless tobacco. She reports that she does not drink alcohol and does not use drugs.    Family History: Their family history includes Diabetes in her brother; Heart disease in her brother, father, and mother; Hypertension in her brother, father, and mother; Kidney disease in her brother.    Allergies: Atorvastatin, Rosuvastatin, and Sulfamethoxazole-trimethoprim     Meds: Scheduled Meds:  Continuous Infusions:  PRN Meds:.    Exam:  Physical Exam               LMP  (LMP Unknown)     Constitutional  Well-developed, well-nourished, appears stated age   Ophthalmoscopic  No papilledema with no hemorrhages or exudates bilaterally   Cardiovascular  Radial pulses 2+ and symmetric, no LE edema bilaterally   Neurological    * Mental status      - Orientation  Oriented to person     - Memory   Impaired recent and remote     - Attention/concentration  Attentive, vigilant during exam     - Language  Naming & repetition intact, but slow.  Difficulty with 2 step exams     - Fund of knowledge  Unaware of current events     - Executive  Poorly organized thoughts     - Other     * Cranial nerves       - CN II  PERRL, visual fields full to confrontation     - CN III, IV, VI  Extraocular movements full, normal pursuits and saccades     - CN V  Sensation V1 - V3 intact     - CN VII  Face strong and symmetric bilaterally     - CN VIII  Hearing intact bilaterally     - CN IX, X  Palate raises midline and symmetric      - CN XI  SCM and trapezius 5/5 bilaterally     - CN XII  Tongue midline   * Motor  Muscle bulk normal, strength 5/5 throughout   * Sensory   Intact to temperature and vibration throughout   * Coordination  No dysmetria with finger-to-nose or heel-to-shin   * Gait  Hesitant   * Deep tendon reflexes  2+ and symmetric throughout   Babinski downgoing bilaterally     Laboratory/Radiological:Reviewed  - Results:  No visits with results within 1 Month(s) from this visit.   Latest known visit with results is:   Lab Visit on 05/20/2025   Component Date Value Ref Range Status    Direct Jose Alberto (TAO) 05/20/2025 NEG   Final    dsDNA Ab Qual 05/20/2025 Negative 1:10  Negative 1:10 Final    C3 Complement 05/20/2025 124  50 - 180 mg/dL Final    C4 Complement 05/20/2025 36  11 - 44 mg/dL Final    CPK 05/20/2025 83  20 - 180 U/L Final    CCP Quant 05/20/2025 2.3  <=4.9 U/mL Final    Sodium 05/20/2025 143  136 - 145 mmol/L Final    Potassium 05/20/2025 4.2  3.5 - 5.1 mmol/L Final    Chloride 05/20/2025 107  95 - 110 mmol/L Final    CO2 05/20/2025 27  23 - 29 mmol/L Final    Glucose 05/20/2025 80  70 - 110 mg/dL Final    BUN 05/20/2025 15  8 - 23 mg/dL Final    Creatinine 05/20/2025 0.7  0.5 - 1.4 mg/dL Final    Calcium 05/20/2025 10.4  8.7 - 10.5 mg/dL Final    Protein Total 05/20/2025 6.8  6.0 - 8.4 gm/dL Final    Albumin 05/20/2025 3.7  3.5 - 5.2 g/dL Final    Bilirubin Total 05/20/2025 0.5  0.1 - 1.0 mg/dL Final    ALP 05/20/2025 89  40 - 150 unit/L Final    AST 05/20/2025 26  11 - 45 unit/L Final    ALT 05/20/2025 26  10 - 44 unit/L Final    Anion Gap 05/20/2025 9  8 - 16 mmol/L Final    eGFR 05/20/2025 >60  >60 mL/min/1.73/m2 Final    CRP 05/20/2025 2.8  <=8.2 mg/L Final    Rheumatoid Factor 05/20/2025 <13.0  <=15.0 IU/mL Final    Color, UA 05/20/2025 Yellow  Straw, Nancie, Yellow, Light-Orange Final    Appearance,  05/20/2025 Clear  Clear Final    pH,  05/20/2025 7.0  5.0 - 8.0 Final    Spec Grav  05/20/2025 1.020   1.005 - 1.030 Final    Protein, UA 05/20/2025 Negative  Negative Final    Glucose, UA 05/20/2025 Negative  Negative Final    Ketones, UA 05/20/2025 Negative  Negative Final    Bilirubin, UA 05/20/2025 Negative  Negative Final    Blood, UA 05/20/2025 Negative  Negative Final    Nitrites, UA 05/20/2025 Negative  Negative Final    Urobilinogen, UA 05/20/2025 Negative  <2.0 EU/dL Final    Leukocyte Esterase, UA 05/20/2025 1+ (A)  Negative Final    Urine Creatinine 05/20/2025 90.0  15.0 - 325.0 mg/dL Final    Urine Protein 05/20/2025 9  <=15 mg/dL Final    Urine Protein/Creatinine Ratio 05/20/2025 0.10  <=0.20 Final    RBC, UA 05/20/2025 1  0 - 4 /HPF Final    WBC, UA 05/20/2025 8 (H)  0 - 5 /HPF Final    Squamous Epithelial Cells, UA 05/20/2025 3  /HPF Final    Microscopic Comment 05/20/2025    Final    ABORH Retype 05/20/2025 O POS   Final           Assessment & Plan      Bilateral median and right ulnar and radial sensory nerves were tested.  Right median and ulnar and left median motor nerves were tested.  Latencies, conduction velocities, and amplitudes were all within normal limits bilaterally.      EMG and nerve conduction studies were performed bilaterally demonstrating no evidence of acute denervation, compression neuropathy, or cervical radiculopathy.    Full procedure report to be filed.          This note was generated with the assistance of ambient listening technology. Verbal consent was obtained by the patient and accompanying visitor(s) for the recording of patient appointment to facilitate this note. I attest to having reviewed and edited the generated note for accuracy, though some syntax or spelling errors may persist. Please contact the author of this note for any clarification.       CHANDLER Cuevas M.D.

## 2025-07-02 ENCOUNTER — TELEPHONE (OUTPATIENT)
Dept: ORTHOPEDICS | Facility: CLINIC | Age: 80
End: 2025-07-02
Payer: COMMERCIAL

## 2025-07-03 ENCOUNTER — TELEPHONE (OUTPATIENT)
Dept: PODIATRY | Facility: CLINIC | Age: 80
End: 2025-07-03
Payer: COMMERCIAL

## 2025-07-03 NOTE — TELEPHONE ENCOUNTER
Spoke with pt daughter in regards to provider being out of clinic for 7/10. Pt r/s and daughter verbalized understanding.

## 2025-07-10 ENCOUNTER — OFFICE VISIT (OUTPATIENT)
Dept: PODIATRY | Facility: CLINIC | Age: 80
End: 2025-07-10
Payer: COMMERCIAL

## 2025-07-10 VITALS
HEART RATE: 76 BPM | SYSTOLIC BLOOD PRESSURE: 146 MMHG | WEIGHT: 140.88 LBS | HEIGHT: 63 IN | DIASTOLIC BLOOD PRESSURE: 76 MMHG | BODY MASS INDEX: 24.96 KG/M2

## 2025-07-10 DIAGNOSIS — G62.0 DRUG-INDUCED POLYNEUROPATHY: ICD-10-CM

## 2025-07-10 DIAGNOSIS — L60.2 ONYCHOGRYPOSIS: ICD-10-CM

## 2025-07-10 PROCEDURE — 17999 UNLISTD PX SKN MUC MEMB SUBQ: CPT | Mod: CSM,,, | Performed by: PODIATRIST

## 2025-07-10 PROCEDURE — 99203 OFFICE O/P NEW LOW 30 MIN: CPT | Mod: ,,, | Performed by: PODIATRIST

## 2025-07-10 PROCEDURE — 99999 PR PBB SHADOW E&M-EST. PATIENT-LVL III: CPT | Mod: PBBFAC,,, | Performed by: PODIATRIST

## 2025-07-10 RX ORDER — CICLOPIROX 80 MG/ML
SOLUTION TOPICAL NIGHTLY
Qty: 6.6 ML | Refills: 11 | Status: SHIPPED | OUTPATIENT
Start: 2025-07-10

## 2025-07-10 NOTE — PROGRESS NOTES
Subjective:      Patient ID: Adripennie Rice is a 79 y.o. female.    Chief Complaint: Routine Foot Care    Thick discolored misshapen toenails all 10 toes.  Gradual onset.  Chronically worsening over the past several years.  Aggravated with socks shoes pressure ambulation.  No prior medical treatment.  No self-treatment.  Denies trauma and surgery both feet.    Review of Systems   Constitutional: Negative for chills, diaphoresis, fever, malaise/fatigue and night sweats.   Cardiovascular:  Negative for claudication, cyanosis, leg swelling and syncope.   Skin:  Positive for nail changes. Negative for color change, dry skin, rash, suspicious lesions and unusual hair distribution.   Musculoskeletal:  Negative for falls, joint pain, joint swelling, muscle cramps, muscle weakness and stiffness.   Gastrointestinal:  Negative for constipation, diarrhea, nausea and vomiting.   Neurological:  Negative for brief paralysis, disturbances in coordination, focal weakness, numbness, paresthesias, sensory change and tremors.           Objective:      Physical Exam  Constitutional:       General: She is not in acute distress.     Appearance: She is well-developed. She is not diaphoretic.   Cardiovascular:      Pulses:           Popliteal pulses are 2+ on the right side and 2+ on the left side.        Dorsalis pedis pulses are 2+ on the right side and 2+ on the left side.        Posterior tibial pulses are 2+ on the right side and 2+ on the left side.      Comments: Capillary refill 3 seconds all toes/distal feet, all toes/both feet warm to touch.      Negative lymphadenopathy bilateral popliteal fossa and tarsal tunnel.      Negavie lower extremity edema bilateral.    Musculoskeletal:      Right ankle: No swelling, deformity, ecchymosis or lacerations. Normal range of motion. Normal pulse.      Right Achilles Tendon: Normal. No defects. Acosta's test negative.      Comments: Normal angle, base, station of gait. All ten toes  without clubbing, cyanosis, or signs of ischemia.  No pain to palpation bilateral lower extremities.  Range of motion, stability, muscle strength, and muscle tone normal bilateral feet and legs.    Lymphadenopathy:      Lower Body: No right inguinal adenopathy. No left inguinal adenopathy.      Comments: Negative lymphadenopathy bilateral popliteal fossa and tarsal tunnel.    Negative lymphangitic streaking bilateral feet/ankles/legs.   Skin:     General: Skin is warm and dry.      Capillary Refill: Capillary refill takes 2 to 3 seconds.      Coloration: Skin is not pale.      Findings: No abrasion, bruising, burn, ecchymosis, erythema, laceration, lesion or rash.      Nails: There is no clubbing.      Comments: Skin is normal age and health appropriate color, turgor, texture, and temperature bilateral lower extremities without ulceration, hyperpigmentation, discoloration, masses nodules or cords palpated.  No ecchymosis, erythema, edema, or cardinal signs of infection bilateral lower extremities.     Toenails 1st, 2nd, 3rd, 4th, 5th  bilateral are hypertrophic thickened 2-3 mm, dystrophic, discolored tanish brown with tan, gray crumbly subungual debris.  Tender to distal nail plate pressure, without periungual skin abnormality of each.      Neurological:      Mental Status: She is alert and oriented to person, place, and time.      Sensory: No sensory deficit.      Motor: No tremor, atrophy or abnormal muscle tone.      Gait: Gait normal.      Deep Tendon Reflexes:      Reflex Scores:       Patellar reflexes are 2+ on the right side and 2+ on the left side.       Achilles reflexes are 2+ on the right side and 2+ on the left side.     Comments: Negative tinel sign to percussion sural, superficial peroneal, deep peroneal, saphenous, and posterior tibial nerves right and left ankles and feet.    Psychiatric:         Behavior: Behavior is cooperative.           Assessment:       Encounter Diagnoses   Name Primary?     Drug-induced polyneuropathy     Onychogryposis          Plan:       Adri was seen today for routine foot care.    Diagnoses and all orders for this visit:    Drug-induced polyneuropathy  -     Ambulatory referral/consult to Podiatry    Onychogryposis  -     Ambulatory referral/consult to Podiatry      I counseled the patient on her conditions, their implications and medical management.        [place].      Dry well after hygiene.      Natural fiber socks changed mid day.      .shoes    Patient enter daughter understands that with the current diagnoses and findings the trimming of nails is not a covered service by insurance.  She verbally indicates understanding and willingness to pay 2 dollars for non-covered foot care.    Non-covered foot care:     With the patient's permission, I debrided all ten toenails with a sterile nipper and curette, removing all offending nail and debris.  Patient tolerated the procedure well and related significant relief.           No follow-ups on file.

## 2025-07-16 NOTE — PROGRESS NOTES
[facility]  Clinic Note    Date of Service: 7/18/2025  Patient seen at the request of: Brenda Jean NP    Reason for Consultation  Alzheimer's disease    Assessment:  Adri Rice is a 79 y.o. female who presents with Alzheimer's disease.    Patient presents with her daughter who is her primary caretaker.  Symptom onset was in summer of 2024 with the patient repetitively calling family members.  Her daughter was in [place] at the time.  Her daughter is now living with her in order to be her caretaker.  Patient is able to manage all activities of daily living, however, she is frequently confused, leading to agitation.  Her daughter reports that the patient thrives when engaged socially, however, she deteriorates in mood when left to her own devices.  Daughter reports that she can not leave the patient alone without her becoming very anxious.    MRIb 8/2024 - Age-appropriate cerebral and cerebellar volume loss with ex vacuo dilatation.   PET 12/2024 - The scan is positive, indicating moderate to frequent amyloid neuritic plaques    Patient was more recently with a day program, however, she was not allowed to continue with the day program due to her apparently being too active and confused with the program.  It appears that she needs a higher level of care.  Even if the patient is able to handle most activities of daily living, she would benefit from assisted living where she can engage in more regular social activity.      Plan:    - Skilled nursing for PT and OT  - [id number]  - [id number]  - AD Detect Beta-amyloid 42/40 ratio    - continue memantine 10mg twice daily  - continue with dementia care management (GUIDE)  - referral to dementia care management      Transfer to more permanent neurologist    A dictation device was used to produce this document. Use of such devices sometimes results in grammatical errors or replacement of words that sound similarly.     Signed:    Adrián Lopez  MD  Neurology/Epilepsy  07/18/2025 11:59 AM      HPI:  Adri Rice is a 79 y.o. female with   Past Medical History:   Diagnosis Date    Asthma     Breast cancer     Fibromyalgia     Hypertension     Psoriasis vulgaris      Daughter who is present with the patient.  Daughter lives in her home now as primary caretaker.      Daughter reports noticing a change in the summer of 2024.  Patient was calling daughter several times a day confused (daughter was in [place]).  Daughter started visiting twice per month.  Patient is still able to go to the grocery and doctor's appointments on her own but had to follow the same paths.      Since August, daughter reports worsening mood and depression.      Patient is increasingly more restless.    Patient is with [facility].  They started buspirone.  Patient was with a day center but she was pushed out of the day center.  Daughter reports that she has been worse off due to not having social interaction, which she thrives off of.      Patient is on Buspar 15mg twice daily, cymbalta, and melatonin.       This is the extent of the patient's complaints at this time.      TSH   Date Value Ref Range Status   03/24/2025 2.184 0.400 - 4.000 uIU/mL Final   03/14/2025 1.593 0.400 - 4.000 uIU/mL Final     Hemoglobin A1c   Date Value Ref Range Status   01/10/2022 5.2 <5.7 % of total Hgb Final     Comment:     For the purpose of screening for the presence of  diabetes:    <5.7%       Consistent with the absence of diabetes  5.7-6.4%    Consistent with increased risk for diabetes              (prediabetes)  > or =6.5%  Consistent with diabetes    This assay result is consistent with a decreased risk  of diabetes.    Currently, no consensus exists regarding use of  hemoglobin A1c for diagnosis of diabetes in children.    According to American Diabetes Association (ADA)  guidelines, hemoglobin A1c <7.0% represents optimal  control in non-pregnant diabetic patients. Different  metrics may apply to  specific patient populations.   Standards of Medical Care in Diabetes(ADA).       12/18/2020 5.3 <5.7 % of total Hgb Final     Comment:     For the purpose of screening for the presence of  diabetes:    <5.7%       Consistent with the absence of diabetes  5.7-6.4%    Consistent with increased risk for diabetes              (prediabetes)  > or =6.5%  Consistent with diabetes    This assay result is consistent with a decreased risk  of diabetes.    Currently, no consensus exists regarding use of  hemoglobin A1c for diagnosis of diabetes in children.    According to American Diabetes Association (ADA)  guidelines, hemoglobin A1c <7.0% represents optimal  control in non-pregnant diabetic patients. Different  metrics may apply to specific patient populations.   Standards of Medical Care in Diabetes(ADA).         % HEMOGLOBIN A1C   Date Value Ref Range Status   01/10/2024 5.3 <5.7 % of total Hgb Final     Comment:     For the purpose of screening for the presence of  diabetes:    <5.7%       Consistent with the absence of diabetes  5.7-6.4%    Consistent with increased risk for diabetes              (prediabetes)  > or =6.5%  Consistent with diabetes    This assay result is consistent with a decreased risk  of diabetes.    Currently, no consensus exists regarding use of  hemoglobin A1c for diagnosis of diabetes in children.    According to American Diabetes Association (ADA)  guidelines, hemoglobin A1c <7.0% represents optimal  control in non-pregnant diabetic patients. Different  metrics may apply to specific patient populations.   Standards of Medical Care in Diabetes(ADA).      HbA1c performed on Roche platform.  Effective 11/13/23 a change in test platforms may have   shifted HbA1c results compared to historical results.   08/25/2023 5.1 <5.7 % of total Hgb Final     Comment:     For the purpose of screening for the presence of  diabetes:    <5.7%       Consistent with the absence of diabetes  5.7-6.4%    Consistent  with increased risk for diabetes              (prediabetes)  > or =6.5%  Consistent with diabetes    This assay result is consistent with a decreased risk  of diabetes.    Currently, no consensus exists regarding use of  hemoglobin A1c for diagnosis of diabetes in children.    According to American Diabetes Association (ADA)  guidelines, hemoglobin A1c <7.0% represents optimal  control in non-pregnant diabetic patients. Different  metrics may apply to specific patient populations.   Standards of Medical Care in Diabetes(ADA).             Review of Systems:  ROS negative unless noted in HPI    Past Surgical History:  Past Surgical History:   Procedure Laterality Date    APPENDECTOMY  1962    BREAST LUMPECTOMY  5-    breast cancer    BUNIONECTOMY  2010    CORONARY ANGIOGRAPHY  2014    no obstructive CAD    EPIDURAL STEROID INJECTION N/A 8/11/2020    Procedure: LUMBAR L2/3 NANCY DIRECT REFERRAL;  Surgeon: Ion Bearden MD;  Location: [facility];  Service: Pain Management;  Laterality: N/A;  NEEDS CONSENT    KNEE ARTHROSCOPY Right 2004    SHOULDER SURGERY  1982    right/had a bone infectioni    THYROIDECTOMY  1977    hyperparathyroid&thyroidectomy 2006    TOTAL KNEE ARTHROPLASTY  10/13/2014       Family History:  Family History   Problem Relation Name Age of Onset    Heart disease Mother      Hypertension Mother      Heart disease Father      Hypertension Father      Diabetes Brother      Hypertension Brother      Heart disease Brother      Kidney disease Brother      Skin cancer Neg Hx      Psoriasis Neg Hx         Social History:  Social History[1]    Allergies:  Atorvastatin, Rosuvastatin, and Sulfamethoxazole-trimethoprim    Outpatient Medications:  Prior to Admission medications    Medication Sig Start Date End Date Taking? Authorizing Provider   albuterol (ACCUNEB) 0.63 mg/3 mL Nebu Take 0.63 mg by nebulization every 6 (six) hours as needed.    Provider, Historical   alendronate (FOSAMAX) 70 MG tablet  Take 1 tablet (70 mg total) by mouth every 7 days. 3/11/25 3/11/26  Santos Castrejon DO   amLODIPine (NORVASC) 5 MG tablet Take 5 mg by mouth once daily.  8/10/20   Provider, Historical   buspirone HCl (BUSPAR ORAL)     Provider, Historical   calcipotriene (DOVONOX) 0.005 % cream Apply topically 2 (two) times daily as needed. 8/1/22   Provider, Historical   ciclopirox (PENLAC) 8 % Soln Apply topically nightly. 7/10/25   Ashutosh Mclean, DPM   fluticasone-umeclidin-vilanter (TRELEGY ELLIPTA) 200-62.5-25 mcg inhaler Inhale 1 puff into the lungs once daily. 3/14/25   Gini Tapia, FLETCHER   irbesartan (AVAPRO) 75 MG tablet Take 75 mg by mouth once daily. 3/27/25   Provider, Historical   levothyroxine (SYNTHROID) 75 MCG tablet Take 1 tablet (75 mcg total) by mouth once daily. 3/11/25   Santos Castrejon DO   memantine (NAMENDA) 10 MG Tab Take 10 mg by mouth.    Provider, Historical   omeprazole (PRILOSEC) 20 MG capsule  5/19/20   Provider, Historical   pravastatin (PRAVACHOL) 40 MG tablet TAKE 1 TABLET BY MOUTH EVERY DAY 2/21/24   Bryan Mejía MD   sertraline (ZOLOFT) 50 MG tablet Take 100 mg by mouth.    Provider, Historical   vitamin D (VITAMIN D3) 1000 units Tab Take 1,000 Units by mouth.    Provider, Historical       Physical exam:    Vitals: /75 (BP Location: Left arm, Patient Position: Sitting)   Pulse 74   Wt 64 kg (141 lb 1.5 oz)   LMP  (LMP Unknown)   BMI 24.99 kg/m²     General:   in no distress, well-nourished, well-developed, appears stated age.  Head/Neck:   Normocephalic,atraumatic  Pulm:  Non-labored breathing     Mental Status: Alert. Speech spontaneous and fluent without paraphasias; no dysarthria  CN: III, IV, VI: EOM intact without nystagmus or diplopia.   VII: Facial movement full and symmetric.   VIII: Hearing grossly normal to conversation.  IX, X: Palate midline with symmetric elevation.    XII: Tongue midline without fasciculations.  Motor: Normal bulk throughout all four  extremities.   RUE: antigravity  LUE: antigravity   RLE: antigravity  LLE: antigravity  No tremors       Imaging:  All pertinent imaging was personally reviewed.             [1]   Social History  Tobacco Use    Smoking status: Former     Current packs/day: 0.00     Types: Cigarettes     Quit date: 1999     Years since quittin.0    Smokeless tobacco: Never   Substance Use Topics    Alcohol use: No    Drug use: No

## 2025-07-18 ENCOUNTER — LAB VISIT (OUTPATIENT)
Dept: LAB | Facility: HOSPITAL | Age: 80
End: 2025-07-18
Attending: STUDENT IN AN ORGANIZED HEALTH CARE EDUCATION/TRAINING PROGRAM
Payer: COMMERCIAL

## 2025-07-18 ENCOUNTER — OFFICE VISIT (OUTPATIENT)
Dept: NEUROLOGY | Facility: CLINIC | Age: 80
End: 2025-07-18
Payer: COMMERCIAL

## 2025-07-18 ENCOUNTER — PATIENT OUTREACH (OUTPATIENT)
Dept: NEUROLOGY | Facility: CLINIC | Age: 80
End: 2025-07-18
Payer: COMMERCIAL

## 2025-07-18 VITALS
SYSTOLIC BLOOD PRESSURE: 130 MMHG | BODY MASS INDEX: 24.99 KG/M2 | HEART RATE: 74 BPM | DIASTOLIC BLOOD PRESSURE: 75 MMHG | WEIGHT: 141.13 LBS

## 2025-07-18 DIAGNOSIS — F02.80 ALZHEIMER'S DISEASE WITH LATE ONSET: ICD-10-CM

## 2025-07-18 DIAGNOSIS — G30.1 ALZHEIMER'S DISEASE WITH LATE ONSET: Primary | ICD-10-CM

## 2025-07-18 DIAGNOSIS — F01.B11: ICD-10-CM

## 2025-07-18 DIAGNOSIS — F02.80 ALZHEIMER'S DISEASE WITH LATE ONSET: Primary | ICD-10-CM

## 2025-07-18 DIAGNOSIS — G30.1 ALZHEIMER'S DISEASE WITH LATE ONSET: ICD-10-CM

## 2025-07-18 PROCEDURE — 84393 TAU PHOSPHORYLATED EA: CPT

## 2025-07-18 PROCEDURE — 99999 PR PBB SHADOW E&M-EST. PATIENT-LVL IV: CPT | Mod: PBBFAC,,, | Performed by: STUDENT IN AN ORGANIZED HEALTH CARE EDUCATION/TRAINING PROGRAM

## 2025-07-18 PROCEDURE — 83520 IMMUNOASSAY QUANT NOS NONAB: CPT

## 2025-07-18 PROCEDURE — 36415 COLL VENOUS BLD VENIPUNCTURE: CPT

## 2025-07-18 PROCEDURE — 82233 BETA-AMYLOID 1-40 (ABETA 40): CPT

## 2025-07-18 RX ORDER — DULOXETIN HYDROCHLORIDE 30 MG/1
30 CAPSULE, DELAYED RELEASE ORAL DAILY
COMMUNITY

## 2025-07-21 ENCOUNTER — PATIENT MESSAGE (OUTPATIENT)
Dept: NEUROLOGY | Facility: CLINIC | Age: 80
End: 2025-07-21
Payer: COMMERCIAL

## 2025-07-21 LAB
IMMUNOLOGIST REVIEW: ABNORMAL
M PHOSPHO-TAU 217: 1.3 PG/ML

## 2025-07-22 ENCOUNTER — PATIENT MESSAGE (OUTPATIENT)
Dept: NEUROLOGY | Facility: CLINIC | Age: 80
End: 2025-07-22
Payer: COMMERCIAL

## 2025-07-22 ENCOUNTER — HOSPITAL ENCOUNTER (EMERGENCY)
Facility: OTHER | Age: 80
Discharge: HOME | End: 2025-07-22
Attending: STUDENT IN AN ORGANIZED HEALTH CARE EDUCATION/TRAINING PROGRAM
Payer: COMMERCIAL

## 2025-07-22 ENCOUNTER — PATIENT OUTREACH (OUTPATIENT)
Dept: NEUROLOGY | Facility: CLINIC | Age: 80
End: 2025-07-22
Payer: COMMERCIAL

## 2025-07-22 VITALS
RESPIRATION RATE: 20 BRPM | SYSTOLIC BLOOD PRESSURE: 177 MMHG | TEMPERATURE: 98 F | HEART RATE: 83 BPM | OXYGEN SATURATION: 99 % | DIASTOLIC BLOOD PRESSURE: 78 MMHG

## 2025-07-22 DIAGNOSIS — R06.02 SHORTNESS OF BREATH: ICD-10-CM

## 2025-07-22 DIAGNOSIS — R41.0 CONFUSION: ICD-10-CM

## 2025-07-22 DIAGNOSIS — N30.00 ACUTE CYSTITIS WITHOUT HEMATURIA: Primary | ICD-10-CM

## 2025-07-22 LAB
ABSOLUTE EOSINOPHIL (OHS): 0.23 K/UL
ABSOLUTE MONOCYTE (OHS): 0.57 K/UL (ref 0.3–1)
ABSOLUTE NEUTROPHIL COUNT (OHS): 3.6 K/UL (ref 1.8–7.7)
ALBUMIN SERPL BCP-MCNC: 3.4 G/DL (ref 3.5–5.2)
ALP SERPL-CCNC: 103 UNIT/L (ref 40–150)
ALT SERPL W/O P-5'-P-CCNC: 17 UNIT/L (ref 10–44)
ANION GAP (OHS): 8 MMOL/L (ref 8–16)
AST SERPL-CCNC: 22 UNIT/L (ref 11–45)
BACTERIA #/AREA URNS AUTO: ABNORMAL /HPF
BASOPHILS # BLD AUTO: 0.02 K/UL
BASOPHILS NFR BLD AUTO: 0.4 %
BILIRUB SERPL-MCNC: 0.4 MG/DL (ref 0.1–1)
BILIRUB UR QL STRIP.AUTO: NEGATIVE
BNP SERPL-MCNC: 41 PG/ML (ref 0–99)
BUN SERPL-MCNC: 18 MG/DL (ref 8–23)
CALCIUM SERPL-MCNC: 9.9 MG/DL (ref 8.7–10.5)
CHLORIDE SERPL-SCNC: 105 MMOL/L (ref 95–110)
CLARITY UR: CLEAR
CO2 SERPL-SCNC: 28 MMOL/L (ref 23–29)
COLOR UR AUTO: YELLOW
CREAT SERPL-MCNC: 0.8 MG/DL (ref 0.5–1.4)
CTP QC/QA: YES
CTP QC/QA: YES
ERYTHROCYTE [DISTWIDTH] IN BLOOD BY AUTOMATED COUNT: 15.3 % (ref 11.5–14.5)
GFR SERPLBLD CREATININE-BSD FMLA CKD-EPI: >60 ML/MIN/1.73/M2
GLUCOSE SERPL-MCNC: 109 MG/DL (ref 70–110)
GLUCOSE UR QL STRIP: NEGATIVE
HCT VFR BLD AUTO: 36.4 % (ref 37–48.5)
HGB BLD-MCNC: 12.1 GM/DL (ref 12–16)
HGB UR QL STRIP: NEGATIVE
IMM GRANULOCYTES # BLD AUTO: 0 K/UL (ref 0–0.04)
IMM GRANULOCYTES NFR BLD AUTO: 0 % (ref 0–0.5)
KETONES UR QL STRIP: NEGATIVE
LEUKOCYTE ESTERASE UR QL STRIP: ABNORMAL
LYMPHOCYTES # BLD AUTO: 1.02 K/UL (ref 1–4.8)
MCH RBC QN AUTO: 31.4 PG (ref 27–31)
MCHC RBC AUTO-ENTMCNC: 33.2 G/DL (ref 32–36)
MCV RBC AUTO: 95 FL (ref 82–98)
MICROSCOPIC COMMENT: ABNORMAL
NITRITE UR QL STRIP: NEGATIVE
NUCLEATED RBC (/100WBC) (OHS): 0 /100 WBC
PH UR STRIP: 7 [PH]
PLATELET # BLD AUTO: 190 K/UL (ref 150–450)
PMV BLD AUTO: 10.6 FL (ref 9.2–12.9)
POC MOLECULAR INFLUENZA A AGN: NEGATIVE
POC MOLECULAR INFLUENZA B AGN: NEGATIVE
POTASSIUM SERPL-SCNC: 4.1 MMOL/L (ref 3.5–5.1)
PROT SERPL-MCNC: 6.7 GM/DL (ref 6–8.4)
PROT UR QL STRIP: NEGATIVE
RBC # BLD AUTO: 3.85 M/UL (ref 4–5.4)
RBC #/AREA URNS AUTO: 1 /HPF (ref 0–4)
RELATIVE EOSINOPHIL (OHS): 4.2 %
RELATIVE LYMPHOCYTE (OHS): 18.8 % (ref 18–48)
RELATIVE MONOCYTE (OHS): 10.5 % (ref 4–15)
RELATIVE NEUTROPHIL (OHS): 66.1 % (ref 38–73)
SARS-COV-2 RDRP RESP QL NAA+PROBE: NEGATIVE
SODIUM SERPL-SCNC: 141 MMOL/L (ref 136–145)
SP GR UR STRIP: 1.02
SQUAMOUS #/AREA URNS AUTO: 2 /HPF
UROBILINOGEN UR STRIP-ACNC: ABNORMAL EU/DL
WBC # BLD AUTO: 5.44 K/UL (ref 3.9–12.7)
WBC #/AREA URNS AUTO: 14 /HPF (ref 0–5)

## 2025-07-22 PROCEDURE — 82374 ASSAY BLOOD CARBON DIOXIDE: CPT | Performed by: PHYSICIAN ASSISTANT

## 2025-07-22 PROCEDURE — 99285 EMERGENCY DEPT VISIT HI MDM: CPT | Mod: 25

## 2025-07-22 PROCEDURE — 87635 SARS-COV-2 COVID-19 AMP PRB: CPT | Performed by: STUDENT IN AN ORGANIZED HEALTH CARE EDUCATION/TRAINING PROGRAM

## 2025-07-22 PROCEDURE — 25000003 PHARM REV CODE 250: Performed by: STUDENT IN AN ORGANIZED HEALTH CARE EDUCATION/TRAINING PROGRAM

## 2025-07-22 PROCEDURE — 83880 ASSAY OF NATRIURETIC PEPTIDE: CPT | Performed by: STUDENT IN AN ORGANIZED HEALTH CARE EDUCATION/TRAINING PROGRAM

## 2025-07-22 PROCEDURE — 93010 ELECTROCARDIOGRAM REPORT: CPT | Mod: ,,, | Performed by: INTERNAL MEDICINE

## 2025-07-22 PROCEDURE — 81003 URINALYSIS AUTO W/O SCOPE: CPT | Performed by: PHYSICIAN ASSISTANT

## 2025-07-22 PROCEDURE — 93005 ELECTROCARDIOGRAM TRACING: CPT

## 2025-07-22 PROCEDURE — 85025 COMPLETE CBC W/AUTO DIFF WBC: CPT | Performed by: PHYSICIAN ASSISTANT

## 2025-07-22 PROCEDURE — 87086 URINE CULTURE/COLONY COUNT: CPT | Performed by: PHYSICIAN ASSISTANT

## 2025-07-22 RX ORDER — CEPHALEXIN 500 MG/1
500 CAPSULE ORAL EVERY 12 HOURS
Qty: 10 CAPSULE | Refills: 0 | Status: SHIPPED | OUTPATIENT
Start: 2025-07-22 | End: 2025-07-27

## 2025-07-22 RX ORDER — CEPHALEXIN 500 MG/1
500 CAPSULE ORAL
Status: COMPLETED | OUTPATIENT
Start: 2025-07-22 | End: 2025-07-22

## 2025-07-22 RX ADMIN — CEPHALEXIN 500 MG: 500 CAPSULE ORAL at 07:07

## 2025-07-22 NOTE — ED TRIAGE NOTES
80 yo female reports to ed BIB daughter with reports of increased confusion. States the pt has been more emotional than normal as well stating how she misses her parents. States she had tried to put a paper towel in th toaster this am thinking it was bread. States she's more so back to her baseline shortly prior to their arrival to the ED. Daughter is insure if this is a UTI or something else. Denies  complaints, pt bowel and bladder continent.

## 2025-07-22 NOTE — ED NOTES
Xray at bedside. Pt continued to repeat same questions to xray tech. Asking what he was doing/and where was the doctor. Xray tech notified nurse that he was unable to reorient to situation. Pt disoriented to situation on arrival to ed.

## 2025-07-22 NOTE — FIRST PROVIDER EVALUATION
Emergency Department TeleTriage Encounter Note      CHIEF COMPLAINT    Chief Complaint   Patient presents with    Altered Mental Status     BIB family for increased confusion x 3 days. PMH alzheimer's. Family reports she has become more confused, fatigued, short of breath with exertion, and restless over the last week. Requesting to be checked for UTI. On arrival, pt disoriented to time.       VITAL SIGNS   Initial Vitals [07/22/25 1531]   BP Pulse Resp Temp SpO2   117/71 79 20 98.1 °F (36.7 °C) 95 %      MAP       --            ALLERGIES    Review of patient's allergies indicates:   Allergen Reactions    Atorvastatin Other (See Comments)    Rosuvastatin Other (See Comments)     Felt vibration/myalgia     Sulfamethoxazole-trimethoprim Swelling, Diarrhea and Other (See Comments)       PROVIDER TRIAGE NOTE  Patient is brought in by family with concern for increased confusion (hx alzheimer's) increased fatigue. She has had recent medication change from zoloft to cymbalta.       ORDERS  Labs Reviewed - No data to display    ED Orders (720h ago, onward)      None              Virtual Visit Note: The provider triage portion of this emergency department evaluation and documentation was performed via [employer], a HIPAA-compliant telemedicine application, in concert with a tele-presenter in the room. A face to face patient evaluation with one of my colleagues will occur once the patient is placed in an emergency department room.      DISCLAIMER: This note was prepared with [employer] voice recognition transcription software. Garbled syntax, mangled pronouns, and other bizarre constructions may be attributed to that software system.

## 2025-07-22 NOTE — ED PROVIDER NOTES
Encounter Date: 7/22/2025       History     Chief Complaint   Patient presents with    Altered Mental Status     BIB family for increased confusion x 3 days. PMH alzheimer's. Family reports she has become more confused, fatigued, short of breath with exertion, and restless over the last week. Requesting to be checked for UTI. On arrival, pt disoriented to time.     Patient has been 79-year-old with a history of dementia, fibromyalgia, HTN who presents with altered mental status.  Given patient's dementia, majority of history was taken from the patient's daughter at the bedside.  Patient's daughter states that over the last 4 days, her mother has been more confused than normal.  She states the confusion really worried her today when her mom was found to put paper towels in the toaster, believing the paper towels to be bread.  She also reports that her mom has had more fatigue, decreased appetite and increasing shortness of breath on exertion over the last few days.  She is concerned her mom possibly has a urinary tract infection.  She also notes a recent medication change.  She states that since they has been in the waiting room, her mother has returned to her baseline neurologic status.  Denies cough, congestion, vomiting or diarrhea, fevers, known falls.  Patient's only complaint is her chronic right hand neuropathy.    The history is provided by the patient. No  was used.     Review of patient's allergies indicates:   Allergen Reactions    Atorvastatin Other (See Comments)    Rosuvastatin Other (See Comments)     Felt vibration/myalgia     Sulfamethoxazole-trimethoprim Swelling, Diarrhea and Other (See Comments)     Past Medical History:   Diagnosis Date    Asthma     Breast cancer     Fibromyalgia     Hypertension     Psoriasis vulgaris      Past Surgical History:   Procedure Laterality Date    APPENDECTOMY  1962    BREAST LUMPECTOMY  7-    breast cancer    BUNIONECTOMY  2010     CORONARY ANGIOGRAPHY  2014    no obstructive CAD    EPIDURAL STEROID INJECTION N/A 8/11/2020    Procedure: LUMBAR L2/3 NANCY DIRECT REFERRAL;  Surgeon: Ion Bearden MD;  Location: [facility];  Service: Pain Management;  Laterality: N/A;  NEEDS CONSENT    KNEE ARTHROSCOPY Right 2004    SHOULDER SURGERY  1982    right/had a bone infectioni    THYROIDECTOMY  1977    hyperparathyroid&thyroidectomy 2006    TOTAL KNEE ARTHROPLASTY  10/13/2014     Family History   Problem Relation Name Age of Onset    Heart disease Mother      Hypertension Mother      Heart disease Father      Hypertension Father      Diabetes Brother      Hypertension Brother      Heart disease Brother      Kidney disease Brother      Skin cancer Neg Hx      Psoriasis Neg Hx       Social History[1]  Review of Systems   Unable to perform ROS: Dementia       Physical Exam     Initial Vitals [07/22/25 1531]   BP Pulse Resp Temp SpO2   117/71 79 20 98.1 °F (36.7 °C) 95 %      MAP       --         Physical Exam    Vitals reviewed.  Constitutional: No distress.   HENT:   Head: Normocephalic and atraumatic.   Eyes: EOM are normal.   Neck:   Normal range of motion.  Cardiovascular:  Normal rate, regular rhythm and intact distal pulses.           Pulmonary/Chest: Breath sounds normal. No respiratory distress.   Abdominal: Abdomen is soft. There is no abdominal tenderness.   Musculoskeletal:         General: No edema. Normal range of motion.      Cervical back: Normal range of motion.     Neurological: She is alert.   No focal neurologic deficits, oriented to self, at reported neurologic baseline   Skin: Skin is warm.         ED Course   Procedures  Labs Reviewed   COMPREHENSIVE METABOLIC PANEL - Abnormal       Result Value    Sodium 141      Potassium 4.1      Chloride 105      CO2 28      Glucose 109      BUN 18      Creatinine 0.8      Calcium 9.9      Protein Total 6.7      Albumin 3.4 (*)     Bilirubin Total 0.4            AST 22      ALT 17       Anion Gap 8      eGFR >60     URINALYSIS, REFLEX TO URINE CULTURE - Abnormal    Color, UA Yellow      Appearance, UA Clear      pH, UA 7.0      Spec Grav UA 1.020      Protein, UA Negative      Glucose, UA Negative      Ketones, UA Negative      Bilirubin, UA Negative      Blood, UA Negative      Nitrites, UA Negative      Urobilinogen, UA 2.0-3.0 (*)     Leukocyte Esterase, UA 2+ (*)    CBC WITH DIFFERENTIAL - Abnormal    WBC 5.44      RBC 3.85 (*)     HGB 12.1      HCT 36.4 (*)     MCV 95      MCH 31.4 (*)     MCHC 33.2      RDW 15.3 (*)     Platelet Count 190      MPV 10.6      Nucleated RBC 0      Neut % 66.1      Lymph % 18.8      Mono % 10.5      Eos % 4.2      Basophil % 0.4      Imm Grans % 0.0      Neut # 3.60      Lymph # 1.02      Mono # 0.57      Eos # 0.23      Baso # 0.02      Imm Grans # 0.00     URINALYSIS MICROSCOPIC - Abnormal    RBC, UA 1      WBC, UA 14 (*)     Bacteria, UA Rare      Squamous Epithelial Cells, UA 2      Microscopic Comment       B-TYPE NATRIURETIC PEPTIDE - Normal    BNP 41     CULTURE, URINE   CBC W/ AUTO DIFFERENTIAL    Narrative:     The following orders were created for panel order CBC auto differential.  Procedure                               Abnormality         Status                     ---------                               -----------         ------                     CBC with Differential[[id number]]       Abnormal            Final result                 Please view results for these tests on the individual orders.   GREY TOP URINE HOLD   SARS-COV-2 RDRP GENE    POC Rapid COVID Negative       Acceptable Yes     POCT INFLUENZA A/B MOLECULAR    POC Molecular Influenza A Ag Negative      POC Molecular Influenza B Ag Negative       Acceptable Yes       EKG Readings: (Independently Interpreted)   Sinus rhythm, rate of 71, normal axis, normal intervals       Imaging Results              X-Ray Chest AP Portable (Final result)  Result time  07/22/25 18:43:49      Final result by Bridgett Becker MD (07/22/25 18:43:49)                   Impression:      No acute cardiopulmonary process identified.      Electronically signed by: Bridgett Becker MD  Date:    07/22/2025  Time:    18:43               Narrative:    EXAMINATION:  XR CHEST AP PORTABLE    CLINICAL HISTORY:  Shortness of breath    TECHNIQUE:  Single frontal view of the chest was performed.    COMPARISON:  January 2022.    FINDINGS:  Cardiac silhouette is normal in size.  Lungs are symmetrically expanded.  No evidence of focal consolidative process, pneumothorax, or significant pleural effusion.  No acute osseous abnormality identified.                                       Medications   cephALEXin capsule 500 mg (500 mg Oral Given 7/22/25 1919)     Medical Decision Making  Adri Rice is a 79 y.o. female with h/o dementia, fibromyalgia, HTN who presents to the ED with a few days of worsening confusion, shortness of breath on exertion    Initial vitals reassuring. Physical exam reveals a pleasant elderly woman who is oriented to self and has no focal neurologic deficits.     Presentation is concerning for urinary tract infection.  DDX includes, but is not limited to: electrolyte abnormality (low/high Na, low/high Mg, hypocalcemia, hypophosphatemia), uremia, worsening dementia.    I will obtain the following to better assess:  CBC, CMP, BNP, chest x-ray, COVID, flu, UA.       Amount and/or Complexity of Data Reviewed  Labs: ordered.  Radiology: ordered.    Risk  Prescription drug management.               ED Course as of 07/22/25 2033 Tue Jul 22, 2025   1909 On re-evaluation, patient feels well.  Workup and exam were overall concerning for urinary tract infection.  Discussed results of the workup with the daughter at the bedside.  She states she does not want her mother to get treated with ciprofloxacin given concerns for tendinitis.  Patient's has a history of recurrent UTIs.   Discussed possibility that she may have antibiotic resistance given frequent UTI, daughter agrees with plan to try treatment outpatient with Keflex, follow up urine culture results.  Will be discharged with PCP follow up and Keflex.  All questions answered.  Return precautions given.  Patient and her daughter are in agreement with the plan.   [KI]      ED Course User Index  [KI] Kena Main MD                               Clinical Impression:  Final diagnoses:  [R06.02] Shortness of breath  [R41.0] Confusion  [N30.00] Acute cystitis without hematuria (Primary)          ED Disposition Condition    Discharge Stable          ED Prescriptions       Medication Sig Dispense Start Date End Date Auth. Provider    cephALEXin (KEFLEX) 500 MG capsule Take 1 capsule (500 mg total) by mouth every 12 (twelve) hours. for 5 days 10 capsule 2025 Kena Main MD          Follow-up Information       Follow up With Specialties Details Why Contact Info    Brenda Jean, NP Family Medicine Schedule an appointment as soon as possible for a visit   [address] [facility] [address]  [phone]            Launch MDCalc MDM  MDCalc MDM Module  2025 8:33 PM [Kena Main]  Data:  - Independent interpretation: I independently reviewed the XR port Chest AP 1 view. It showed no acute abnormality. [Kena Main]  - Test/documents/historian: 3+ tests ordered  Problems: Confusion  Additional encounter diagnoses: Shortness of breath, Acute cystitis without hematuria  Risk: RX: cephALEXin capsule + 1 more (Rx drug management)             [1]   Social History  Tobacco Use    Smoking status: Former     Current packs/day: 0.00     Types: Cigarettes     Quit date: 1999     Years since quittin.0    Smokeless tobacco: Never   Substance Use Topics    Alcohol use: No    Drug use: No        Kena Main MD  25

## 2025-07-22 NOTE — ED NOTES
Pt able to ambulate to bathroom with standby assist. Pt continent at this time. Able to perform ADL's without assist while at home. Pt daughter reports pt has had increased confusion. Hx sundowning and dementia.

## 2025-07-22 NOTE — PROGRESS NOTES
Dementia Care Management        Date of Service: 2025  Care Navigator completing this form: Padmini Blanca  PCP: Brenda Jean, NP    Patient: Adri Rice  MRN: [id number]  : 1945  Age: 79 y.o.  Gender: female  Race: Black or   Ethnicity: Not  or /a      Outreach Outcome:   Spoke with April (daughter) about updates in patient's care:    - Incoming email from April requesting a call back.   - April interested in connecting with an Elder Law , CTN sent list via email.   - CTN pulled SNF orders from medical records for April to take to [facility]. [facility] is reassessing patient tomorrow to confirm if SNF is warranted. April is confused about this extra step as the need and order has already been placed by a specialist who manages patient's dementia care needs.   - CTN shared that she is scheduled to consult case with SW team on Thursday this week.   - CTN will try to connect patient with support at [facility] for better understanding Medicare/Medicaid. Caregiver feels she is getting the run around when calling insurance and not completely understanding what the possibilities are.   - April confirmed that the goal is to place patient in a NH.   - Appt with Dr. Tyler coming up in August    Next steps: Check in on Friday at 9:30 am. Caregiver knows to reach CTN, as needed.

## 2025-07-23 DIAGNOSIS — G30.1 ALZHEIMER'S DISEASE WITH LATE ONSET: ICD-10-CM

## 2025-07-23 DIAGNOSIS — F02.80 ALZHEIMER'S DISEASE WITH LATE ONSET: ICD-10-CM

## 2025-07-23 DIAGNOSIS — F01.B11: Primary | ICD-10-CM

## 2025-07-23 LAB
HOLD SPECIMEN: NORMAL
LC PHOSPHO-TAU (181P): 2.62 PG/ML (ref 0–0.97)
OHS QRS DURATION: 84 MS
OHS QTC CALCULATION: 412 MS

## 2025-07-23 NOTE — DISCHARGE INSTRUCTIONS

## 2025-07-24 LAB — BACTERIA UR CULT: NORMAL

## 2025-07-25 ENCOUNTER — PATIENT OUTREACH (OUTPATIENT)
Dept: NEUROLOGY | Facility: CLINIC | Age: 80
End: 2025-07-25
Payer: COMMERCIAL

## 2025-07-25 NOTE — PROGRESS NOTES
Dementia Care Management        Date of Service: 2025  Care Navigator completing this form: Padmini Blanca  PCP: Brenda Jean, NP    Patient: Adri Rice  MRN: [id number]  : 1945  Age: 79 y.o.  Gender: female  Race: Black or   Ethnicity: Not  or /a      Outreach Outcome:   Spoke with April (daughter) about updates in patient's care and completed a three-way call with Medicaid to determine Nursing Home elgibility, coverage, and next steps. Caregiver April was able to get her questions about coverage answered. She will now sit on the information for a few days before making a decision.     For Waiver Programs: [phone]  For General Medicaid: [phone]  For Long Term Care Assistance (NH): [phone]      Next steps: PRN. Caregiver knows to reach CTN, as needed.

## 2025-07-28 ENCOUNTER — PATIENT MESSAGE (OUTPATIENT)
Dept: PULMONOLOGY | Facility: CLINIC | Age: 80
End: 2025-07-28
Payer: COMMERCIAL

## 2025-08-08 ENCOUNTER — OFFICE VISIT (OUTPATIENT)
Dept: PULMONOLOGY | Facility: CLINIC | Age: 80
End: 2025-08-08
Payer: COMMERCIAL

## 2025-08-08 VITALS
BODY MASS INDEX: 25.31 KG/M2 | WEIGHT: 142.88 LBS | OXYGEN SATURATION: 99 % | DIASTOLIC BLOOD PRESSURE: 77 MMHG | SYSTOLIC BLOOD PRESSURE: 174 MMHG | HEART RATE: 64 BPM

## 2025-08-08 DIAGNOSIS — J45.909 MODERATE ASTHMA, UNSPECIFIED WHETHER COMPLICATED, UNSPECIFIED WHETHER PERSISTENT: Primary | ICD-10-CM

## 2025-08-08 PROBLEM — J45.40 MODERATE PERSISTENT ASTHMA, UNCOMPLICATED: Status: ACTIVE | Noted: 2025-08-08

## 2025-08-08 PROCEDURE — 99999 PR PBB SHADOW E&M-EST. PATIENT-LVL III: CPT | Mod: PBBFAC,,,

## 2025-08-08 NOTE — PROGRESS NOTES
"History and Physical Note  [facility]    Subjective:     Reason for visit: Asthma    Patient ID:  Adri Rice is a 79 y.o. female  History of Present Illness      Interval History 08/08/2025:  Patient presents today with her daughter, April for PFT review and evaluation of increased shortness of breath. Miss Rush appeared to have more confusion than baseline during the visit. She has a known history of dementia. Today, she is able to state her name and date of birth but is concerned she is hospitalized and is confused about why she is at the appointment.  April reports that her mothers confusion has been increasing recently. Her main concern prompting todays visit was that her mother seemed more short of breath with activity around the home. The patient has been using her triple therapy inhaler daily with appropriate rinsing and is reportedly adherent. No notable expiratory wheezing was present on exam.  PFT results today were reassuring, indicating stable asthma. Lung auscultation revealed clear breath sounds. Chest X-ray from 07/22 showed no abnormalities. Cardiology has not recommended medication changes, and speech therapy did not identify any dysphagia or aspiration risk.    Interval History 04/17/2025:   Patient is here with daughter April. We started patient on trelegy one augustine ago for her asthma symptoms. She reports improvement in cough with no recent episodes of exacerbation. She experiences shortness of breath every other day and denies wheezing. Per family member, she has history of difficulty recognizing asthma symptoms, often attributing them to being "short winded" without using her inhaler appropriately. She takes Trelegy inhaler in the mornings, rinses her mouth afterward, but complains about the taste. This has been manageable by rinsing mouth out after each use.     She reports recent weight loss from 149 lbs to 140 lbs within a week. Her caregiver notes consistent eating " habits with 3-4 meals daily. The weight loss may be attributed to recent anxiety and increased physical activity, including extensive walking during a 4-day stay at a respite facility. She also reports increased fatigue.    Echocardiogram was normal.   Interval History 2025:  Patient presents with her daughter April that provides additional history. She was previously being followed by Pulm Department at [facility] for asthma. Asthma diagnosed in . Last PFT showing evidence of obstruction with bronchodilator response upon review. She reports a worsening cough. She was previously taking flovent but has stopped since 2024. She developed thrush from use that lasted 3-4 months and was reporting symptoms of dizziness when using.     Cough: two weeks of coughing  Sputum: clear   MMRC grade level: 4  Respiratory illness: none in the last 6 months  Last ED/UC visit: none in the last 6 months   Exercise: limited now   Immunization: not up to date  Current COPD treatment plan: albuterol just as needed     Her daughter April reports post prandial cough especially with chips and dinner. Cough happens in the middle of the night and worse upon waking up.   She has persistent sinus drainage and allergic rhinitis. Recurrent coughing that last for about 10 minutes. Abx for sinus infection recently and no cough during that time.   She was previously on a PPI for gastric reflux.     Additional Pulmonary History:  Environmental Exposures: Heat, pollen, smoke   Exposure to Animals/Pets: none  History of exposures to TB: none  Family History of Lung Cancer: none  Childhood Illnesses:  none  Tobacco/Smokin; no heavy smoking history   Tobacco Use History[1]    Objective:     Vitals:    25 0812   BP: (!) 174/77   BP Location: Left arm   Patient Position: Sitting   Pulse: 64   SpO2: 99%   Weight: 64.8 kg (142 lb 13.7 oz)         Physical Exam  Constitutional:       Appearance: Normal appearance. She is  well-developed.   HENT:      Head: Normocephalic.   Cardiovascular:      Rate and Rhythm: Normal rate.      Pulses: Normal pulses.      Heart sounds: Normal heart sounds, S1 normal and S2 normal.   Pulmonary:      Effort: Pulmonary effort is normal.      Breath sounds: Normal breath sounds. No decreased breath sounds.   Musculoskeletal:      Right lower leg: No edema.      Left lower leg: No edema.   Skin:     General: Skin is warm.      Capillary Refill: Capillary refill takes less than 2 seconds.      Findings: No rash.      Nails: There is no clubbing.   Neurological:      Mental Status: She is alert and oriented to person, place, and time. Mental status is at baseline.   Psychiatric:         Attention and Perception: Attention normal.         Mood and Affect: Mood and affect normal.         Speech: Speech normal.         Behavior: Behavior is cooperative.        Personal Diagnostic Review and Interpretation  03/17/2025 CT chest  No high risk nodules   No emphysema  No pneumonia     07/2025  No pneumonia  No pleural effusion  No pneumothorax    Pertinent Studies Reviewed & Interpreted:     Pulmonary Function Tests:   No obstruction  No restriction  No decreased diffusing capacity     Other Pertinent Laboratories:  Lab Results   Component Value Date    WBC 5.44 07/22/2025    RBC 3.85 (L) 07/22/2025    HGB 12.1 07/22/2025    MCV 95 07/22/2025    MCH 31.4 (H) 07/22/2025    MCHC 33.2 07/22/2025    RDW 15.3 (H) 07/22/2025     07/22/2025    MPV 10.6 07/22/2025    GRAN 3.0 05/25/2021    GRAN 57.5 05/25/2021    LYMPH 18.8 07/22/2025    LYMPH 1.02 07/22/2025    MONO 10.5 07/22/2025    MONO 0.57 07/22/2025    EOS 4.2 07/22/2025    EOS 0.23 07/22/2025    BASO 0.04 05/25/2021       Assessment & Plan:   Discussed with April that asthma is currently well controlled on the triple therapy inhaler. No changes to inhaler regimen are recommended at this time; however, if confusion begins to interfere with proper inhaler  technique, nebulizer therapy may be considered. Advised to continue albuterol as needed for acute symptoms.    Plan:  Clinical impression is resonably certain and repeated evaluation prn +/- follow up will be needed as below.      1. Moderate asthma, unspecified whether complicated, unspecified whether persistent  Assessment & Plan:  Reviewed PFT and symptoms, assessed inhaler adherence and technique.  PFT stable, no wheezing on exam.  Asthma well controlled on triple therapy.  Continue current inhaler regimen, rinse after use, albuterol PRN; consider nebulizer if technique declines due cognition          RETURN TO CLINIC IN 6 MONTHS     Total professional time spent for the encounter: 30 minutes  Time was spent preparing to see the patient, reviewing results of prior testing, obtaining and/or reviewing separately obtained history, performing a medically appropriate examination and interview, counseling and educating the patient/family, ordering medications/tests/procedures, referring and communicating with other health care professionals, documenting clinical information in the electronic health record, and independently interpreting results.  This note was generated with the assistance of ambient listening technology. Verbal consent was obtained by the patient and accompanying visitor(s) for the recording of patient appointment to facilitate this note. I attest to having reviewed and edited the generated note for accuracy, though some syntax or spelling errors may persist. Please contact the author of this note for any clarification.    Gini Tapia, FLETCHER           [1]   Social History  Tobacco Use   Smoking Status Former    Current packs/day: 0.00    Types: Cigarettes    Quit date: 1999    Years since quittin.0   Smokeless Tobacco Never

## 2025-08-08 NOTE — ASSESSMENT & PLAN NOTE
Reviewed PFT and symptoms, assessed inhaler adherence and technique.  PFT stable, no wheezing on exam.  Asthma well controlled on triple therapy.  Continue current inhaler regimen, rinse after use, albuterol PRN; consider nebulizer if technique declines due cognition

## 2025-08-15 ENCOUNTER — PATIENT MESSAGE (OUTPATIENT)
Dept: ORTHOPEDICS | Facility: CLINIC | Age: 80
End: 2025-08-15
Payer: COMMERCIAL

## 2025-08-15 DIAGNOSIS — M79.642 BILATERAL HAND PAIN: Primary | ICD-10-CM

## 2025-08-15 DIAGNOSIS — M79.641 BILATERAL HAND PAIN: Primary | ICD-10-CM

## 2025-08-18 ENCOUNTER — TELEPHONE (OUTPATIENT)
Dept: ORTHOPEDICS | Facility: CLINIC | Age: 80
End: 2025-08-18
Payer: COMMERCIAL

## 2025-08-18 ENCOUNTER — OFFICE VISIT (OUTPATIENT)
Dept: PALLIATIVE MEDICINE | Facility: CLINIC | Age: 80
End: 2025-08-18
Payer: COMMERCIAL

## 2025-08-18 VITALS
HEIGHT: 63 IN | HEART RATE: 60 BPM | OXYGEN SATURATION: 99 % | DIASTOLIC BLOOD PRESSURE: 68 MMHG | BODY MASS INDEX: 25.5 KG/M2 | WEIGHT: 143.94 LBS | SYSTOLIC BLOOD PRESSURE: 154 MMHG

## 2025-08-18 DIAGNOSIS — F03.B4 MODERATE DEMENTIA WITH ANXIETY, UNSPECIFIED DEMENTIA TYPE: Primary | ICD-10-CM

## 2025-08-18 DIAGNOSIS — Z51.5 PALLIATIVE CARE ENCOUNTER: ICD-10-CM

## 2025-08-18 DIAGNOSIS — Z71.89 ACP (ADVANCE CARE PLANNING): ICD-10-CM

## 2025-08-18 PROCEDURE — 99999 PR PBB SHADOW E&M-EST. PATIENT-LVL III: CPT | Mod: PBBFAC,,, | Performed by: STUDENT IN AN ORGANIZED HEALTH CARE EDUCATION/TRAINING PROGRAM

## 2025-08-18 PROCEDURE — 99205 OFFICE O/P NEW HI 60 MIN: CPT | Mod: 25,S$GLB,, | Performed by: STUDENT IN AN ORGANIZED HEALTH CARE EDUCATION/TRAINING PROGRAM

## 2025-08-18 PROCEDURE — 99497 ADVNCD CARE PLAN 30 MIN: CPT | Mod: 25,S$GLB,, | Performed by: STUDENT IN AN ORGANIZED HEALTH CARE EDUCATION/TRAINING PROGRAM

## 2025-08-18 RX ORDER — TRAZODONE HYDROCHLORIDE 50 MG/1
50 TABLET ORAL NIGHTLY
COMMUNITY

## 2025-08-19 ENCOUNTER — HOSPITAL ENCOUNTER (OUTPATIENT)
Dept: RADIOLOGY | Facility: OTHER | Age: 80
Discharge: HOME | End: 2025-08-19
Attending: ORTHOPAEDIC SURGERY
Payer: COMMERCIAL

## 2025-08-19 ENCOUNTER — OFFICE VISIT (OUTPATIENT)
Dept: ORTHOPEDICS | Facility: CLINIC | Age: 80
End: 2025-08-19
Payer: COMMERCIAL

## 2025-08-19 VITALS — BODY MASS INDEX: 25.94 KG/M2 | HEIGHT: 63 IN | WEIGHT: 146.38 LBS

## 2025-08-19 DIAGNOSIS — M19.011 ARTHRITIS OF RIGHT SHOULDER: Primary | ICD-10-CM

## 2025-08-19 DIAGNOSIS — M79.642 BILATERAL HAND PAIN: ICD-10-CM

## 2025-08-19 DIAGNOSIS — M79.641 BILATERAL HAND PAIN: ICD-10-CM

## 2025-08-19 PROCEDURE — 99213 OFFICE O/P EST LOW 20 MIN: CPT | Mod: 25,S$GLB,, | Performed by: ORTHOPAEDIC SURGERY

## 2025-08-19 PROCEDURE — 20610 DRAIN/INJ JOINT/BURSA W/O US: CPT | Mod: RT,S$GLB,, | Performed by: ORTHOPAEDIC SURGERY

## 2025-08-19 PROCEDURE — 99999 PR PBB SHADOW E&M-EST. PATIENT-LVL III: CPT | Mod: PBBFAC,,, | Performed by: ORTHOPAEDIC SURGERY

## 2025-08-19 PROCEDURE — 73130 X-RAY EXAM OF HAND: CPT | Mod: TC,50

## 2025-08-19 PROCEDURE — 73130 X-RAY EXAM OF HAND: CPT | Mod: 26,50,, | Performed by: RADIOLOGY

## 2025-08-19 RX ADMIN — TRIAMCINOLONE ACETONIDE 80 MG: 40 INJECTION, SUSPENSION INTRA-ARTICULAR; INTRAMUSCULAR at 01:08

## 2025-08-27 RX ORDER — TRIAMCINOLONE ACETONIDE 40 MG/ML
80 INJECTION, SUSPENSION INTRA-ARTICULAR; INTRAMUSCULAR
Status: DISCONTINUED | OUTPATIENT
Start: 2025-08-19 | End: 2025-08-27 | Stop reason: HOSPADM

## (undated) DEVICE — DRESSING LEUKOPLAST FLEX 1X3IN